# Patient Record
Sex: MALE | Race: WHITE | NOT HISPANIC OR LATINO | URBAN - METROPOLITAN AREA
[De-identification: names, ages, dates, MRNs, and addresses within clinical notes are randomized per-mention and may not be internally consistent; named-entity substitution may affect disease eponyms.]

---

## 2017-01-01 ENCOUNTER — AMBULATORY - HEALTHEAST (OUTPATIENT)
Dept: NURSING | Facility: CLINIC | Age: 67
End: 2017-01-01

## 2017-01-01 ENCOUNTER — COMMUNICATION - HEALTHEAST (OUTPATIENT)
Dept: FAMILY MEDICINE | Facility: CLINIC | Age: 67
End: 2017-01-01

## 2017-01-01 ENCOUNTER — COMMUNICATION - HEALTHEAST (OUTPATIENT)
Dept: ONCOLOGY | Facility: CLINIC | Age: 67
End: 2017-01-01

## 2017-01-01 ENCOUNTER — COMMUNICATION - HEALTHEAST (OUTPATIENT)
Dept: NURSING | Facility: CLINIC | Age: 67
End: 2017-01-01

## 2017-01-01 ENCOUNTER — RECORDS - HEALTHEAST (OUTPATIENT)
Dept: GENERAL RADIOLOGY | Facility: CLINIC | Age: 67
End: 2017-01-01

## 2017-01-01 ENCOUNTER — COMMUNICATION - HEALTHEAST (OUTPATIENT)
Dept: ONCOLOGY | Facility: HOSPITAL | Age: 67
End: 2017-01-01

## 2017-01-01 ENCOUNTER — HOSPITAL ENCOUNTER (OUTPATIENT)
Dept: PHYSICAL MEDICINE AND REHAB | Facility: CLINIC | Age: 67
Discharge: HOME | End: 2017-10-30
Attending: FAMILY MEDICINE

## 2017-01-01 ENCOUNTER — OFFICE VISIT - HEALTHEAST (OUTPATIENT)
Dept: FAMILY MEDICINE | Facility: CLINIC | Age: 67
End: 2017-01-01

## 2017-01-01 ENCOUNTER — AMBULATORY - HEALTHEAST (OUTPATIENT)
Dept: CARE COORDINATION | Facility: CLINIC | Age: 67
End: 2017-01-01

## 2017-01-01 ENCOUNTER — OFFICE VISIT - HEALTHEAST (OUTPATIENT)
Dept: ONCOLOGY | Facility: HOSPITAL | Age: 67
End: 2017-01-01

## 2017-01-01 ENCOUNTER — HOSPITAL ENCOUNTER (OUTPATIENT)
Dept: CT IMAGING | Facility: HOSPITAL | Age: 67
Setting detail: RADIATION/ONCOLOGY SERIES
Discharge: STILL ADMITTED OR ADMINISTRATIVE | End: 2017-10-10
Attending: INTERNAL MEDICINE

## 2017-01-01 ENCOUNTER — AMBULATORY - HEALTHEAST (OUTPATIENT)
Dept: INFUSION THERAPY | Facility: HOSPITAL | Age: 67
End: 2017-01-01

## 2017-01-01 DIAGNOSIS — I10 ESSENTIAL HYPERTENSION: ICD-10-CM

## 2017-01-01 DIAGNOSIS — C34.90 LUNG CANCER (H): ICD-10-CM

## 2017-01-01 DIAGNOSIS — M54.41 ACUTE RIGHT-SIDED LOW BACK PAIN WITH RIGHT-SIDED SCIATICA: ICD-10-CM

## 2017-01-01 DIAGNOSIS — R60.0 LOCALIZED EDEMA: ICD-10-CM

## 2017-01-01 DIAGNOSIS — M51.369 DEGENERATIVE DISC DISEASE, LUMBAR: ICD-10-CM

## 2017-01-01 DIAGNOSIS — M15.0 PRIMARY OSTEOARTHRITIS INVOLVING MULTIPLE JOINTS: ICD-10-CM

## 2017-01-01 DIAGNOSIS — M54.50 CHRONIC BILATERAL LOW BACK PAIN WITHOUT SCIATICA: ICD-10-CM

## 2017-01-01 DIAGNOSIS — M25.559 PAIN IN JOINT INVOLVING PELVIC REGION AND THIGH, UNSPECIFIED LATERALITY: ICD-10-CM

## 2017-01-01 DIAGNOSIS — Z79.899 CONTROLLED SUBSTANCE AGREEMENT SIGNED: ICD-10-CM

## 2017-01-01 DIAGNOSIS — F41.1 GENERALIZED ANXIETY DISORDER: ICD-10-CM

## 2017-01-01 DIAGNOSIS — G89.29 CHRONIC BILATERAL LOW BACK PAIN WITHOUT SCIATICA: ICD-10-CM

## 2017-01-01 DIAGNOSIS — M25.511 PAIN IN JOINT OF RIGHT SHOULDER: ICD-10-CM

## 2017-01-01 DIAGNOSIS — C34.02 MALIGNANT NEOPLASM OF HILUS OF LEFT LUNG (H): ICD-10-CM

## 2017-01-01 DIAGNOSIS — E78.2 MIXED HYPERLIPIDEMIA: ICD-10-CM

## 2017-01-01 DIAGNOSIS — M54.16 RADICULOPATHY, LUMBAR REGION: ICD-10-CM

## 2017-01-01 DIAGNOSIS — F40.240 CLAUSTROPHOBIA: ICD-10-CM

## 2017-01-01 DIAGNOSIS — M47.816 LUMBAR FACET ARTHROPATHY: ICD-10-CM

## 2017-01-01 DIAGNOSIS — K59.1 FUNCTIONAL DIARRHEA: ICD-10-CM

## 2017-01-01 DIAGNOSIS — C34.32 MALIGNANT NEOPLASM OF LOWER LOBE OF LEFT LUNG (H): ICD-10-CM

## 2017-01-01 ASSESSMENT — MIFFLIN-ST. JEOR
SCORE: 1605.84
SCORE: 1579.82

## 2017-01-04 ENCOUNTER — AMBULATORY - HEALTHEAST (OUTPATIENT)
Dept: NURSING | Facility: CLINIC | Age: 67
End: 2017-01-04

## 2017-01-06 ENCOUNTER — COMMUNICATION - HEALTHEAST (OUTPATIENT)
Dept: FAMILY MEDICINE | Facility: CLINIC | Age: 67
End: 2017-01-06

## 2017-01-09 ENCOUNTER — INFUSION - HEALTHEAST (OUTPATIENT)
Dept: INFUSION THERAPY | Facility: HOSPITAL | Age: 67
End: 2017-01-09

## 2017-01-09 ENCOUNTER — AMBULATORY - HEALTHEAST (OUTPATIENT)
Dept: ONCOLOGY | Facility: HOSPITAL | Age: 67
End: 2017-01-09

## 2017-01-09 ENCOUNTER — AMBULATORY - HEALTHEAST (OUTPATIENT)
Dept: INFUSION THERAPY | Facility: HOSPITAL | Age: 67
End: 2017-01-09

## 2017-01-09 DIAGNOSIS — D64.9 ANEMIA: ICD-10-CM

## 2017-01-09 DIAGNOSIS — E83.42 HYPOMAGNESEMIA: ICD-10-CM

## 2017-01-09 DIAGNOSIS — C34.32 MALIGNANT NEOPLASM OF LOWER LOBE OF LEFT LUNG (H): ICD-10-CM

## 2017-01-11 ENCOUNTER — OFFICE VISIT - HEALTHEAST (OUTPATIENT)
Dept: FAMILY MEDICINE | Facility: CLINIC | Age: 67
End: 2017-01-11

## 2017-01-11 DIAGNOSIS — R60.0 LOWER EXTREMITY EDEMA: ICD-10-CM

## 2017-01-11 ASSESSMENT — MIFFLIN-ST. JEOR: SCORE: 1654.66

## 2017-01-12 ENCOUNTER — COMMUNICATION - HEALTHEAST (OUTPATIENT)
Dept: FAMILY MEDICINE | Facility: CLINIC | Age: 67
End: 2017-01-12

## 2017-01-12 DIAGNOSIS — M79.641 HAND PAIN, RIGHT: ICD-10-CM

## 2017-01-12 DIAGNOSIS — M79.644 THUMB PAIN, RIGHT: ICD-10-CM

## 2017-01-12 DIAGNOSIS — M15.0 PRIMARY OSTEOARTHRITIS INVOLVING MULTIPLE JOINTS: ICD-10-CM

## 2017-01-18 ENCOUNTER — COMMUNICATION - HEALTHEAST (OUTPATIENT)
Dept: FAMILY MEDICINE | Facility: CLINIC | Age: 67
End: 2017-01-18

## 2017-01-18 DIAGNOSIS — R60.9 EDEMA: ICD-10-CM

## 2017-01-25 ENCOUNTER — COMMUNICATION - HEALTHEAST (OUTPATIENT)
Dept: FAMILY MEDICINE | Facility: CLINIC | Age: 67
End: 2017-01-25

## 2017-01-29 ENCOUNTER — COMMUNICATION - HEALTHEAST (OUTPATIENT)
Dept: FAMILY MEDICINE | Facility: CLINIC | Age: 67
End: 2017-01-29

## 2017-01-30 ENCOUNTER — COMMUNICATION - HEALTHEAST (OUTPATIENT)
Dept: FAMILY MEDICINE | Facility: CLINIC | Age: 67
End: 2017-01-30

## 2017-01-31 ENCOUNTER — COMMUNICATION - HEALTHEAST (OUTPATIENT)
Dept: FAMILY MEDICINE | Facility: CLINIC | Age: 67
End: 2017-01-31

## 2017-01-31 ENCOUNTER — OFFICE VISIT - HEALTHEAST (OUTPATIENT)
Dept: FAMILY MEDICINE | Facility: CLINIC | Age: 67
End: 2017-01-31

## 2017-01-31 DIAGNOSIS — I10 ESSENTIAL HYPERTENSION WITH GOAL BLOOD PRESSURE LESS THAN 140/90: ICD-10-CM

## 2017-01-31 DIAGNOSIS — R60.0 LOCALIZED EDEMA: ICD-10-CM

## 2017-01-31 ASSESSMENT — MIFFLIN-ST. JEOR: SCORE: 1654.38

## 2017-02-02 ENCOUNTER — HOSPITAL ENCOUNTER (OUTPATIENT)
Dept: CT IMAGING | Facility: HOSPITAL | Age: 67
Setting detail: RADIATION/ONCOLOGY SERIES
Discharge: STILL ADMITTED OR ADMINISTRATIVE | End: 2017-02-02
Attending: INTERNAL MEDICINE

## 2017-02-02 DIAGNOSIS — C34.32 MALIGNANT NEOPLASM OF LOWER LOBE OF LEFT LUNG (H): ICD-10-CM

## 2017-02-06 ENCOUNTER — AMBULATORY - HEALTHEAST (OUTPATIENT)
Dept: INFUSION THERAPY | Facility: HOSPITAL | Age: 67
End: 2017-02-06

## 2017-02-06 ENCOUNTER — OFFICE VISIT - HEALTHEAST (OUTPATIENT)
Dept: ONCOLOGY | Facility: HOSPITAL | Age: 67
End: 2017-02-06

## 2017-02-06 DIAGNOSIS — C34.32 MALIGNANT NEOPLASM OF LOWER LOBE OF LEFT LUNG (H): ICD-10-CM

## 2017-02-06 DIAGNOSIS — C34.30 MALIGNANT NEOPLASM OF LOWER LOBE OF LUNG, UNSPECIFIED LATERALITY (H): ICD-10-CM

## 2017-02-06 DIAGNOSIS — R79.0 LOW MAGNESIUM LEVELS: ICD-10-CM

## 2017-02-07 ENCOUNTER — COMMUNICATION - HEALTHEAST (OUTPATIENT)
Dept: FAMILY MEDICINE | Facility: CLINIC | Age: 67
End: 2017-02-07

## 2017-02-07 ENCOUNTER — AMBULATORY - HEALTHEAST (OUTPATIENT)
Dept: ONCOLOGY | Facility: HOSPITAL | Age: 67
End: 2017-02-07

## 2017-02-07 DIAGNOSIS — E53.8 B12 DEFICIENCY: ICD-10-CM

## 2017-02-08 ENCOUNTER — AMBULATORY - HEALTHEAST (OUTPATIENT)
Dept: ONCOLOGY | Facility: HOSPITAL | Age: 67
End: 2017-02-08

## 2017-02-08 ENCOUNTER — AMBULATORY - HEALTHEAST (OUTPATIENT)
Dept: NURSING | Facility: CLINIC | Age: 67
End: 2017-02-08

## 2017-02-08 DIAGNOSIS — E53.8 B12 DEFICIENCY: ICD-10-CM

## 2017-02-10 ENCOUNTER — OFFICE VISIT - HEALTHEAST (OUTPATIENT)
Dept: CARDIOLOGY | Facility: CLINIC | Age: 67
End: 2017-02-10

## 2017-02-10 DIAGNOSIS — I25.84 CORONARY ARTERY DISEASE DUE TO CALCIFIED CORONARY LESION: ICD-10-CM

## 2017-02-10 DIAGNOSIS — I50.32 CHRONIC DIASTOLIC CONGESTIVE HEART FAILURE (H): ICD-10-CM

## 2017-02-10 DIAGNOSIS — I10 HIGH BLOOD PRESSURE: ICD-10-CM

## 2017-02-10 DIAGNOSIS — I25.10 CORONARY ARTERY DISEASE DUE TO CALCIFIED CORONARY LESION: ICD-10-CM

## 2017-02-10 DIAGNOSIS — I10 ESSENTIAL HYPERTENSION: ICD-10-CM

## 2017-02-10 DIAGNOSIS — E78.2 MIXED HYPERLIPIDEMIA: ICD-10-CM

## 2017-02-10 ASSESSMENT — MIFFLIN-ST. JEOR: SCORE: 1672.52

## 2017-02-15 ENCOUNTER — OFFICE VISIT - HEALTHEAST (OUTPATIENT)
Dept: VASCULAR SURGERY | Facility: CLINIC | Age: 67
End: 2017-02-15

## 2017-02-15 DIAGNOSIS — I87.303 VENOUS HYPERTENSION OF BOTH LOWER EXTREMITIES: ICD-10-CM

## 2017-02-15 DIAGNOSIS — E55.9 VITAMIN D DEFICIENCY: ICD-10-CM

## 2017-02-15 DIAGNOSIS — R60.9 EDEMA: ICD-10-CM

## 2017-02-15 DIAGNOSIS — I89.0 ACQUIRED LYMPHEDEMA OF LEG: ICD-10-CM

## 2017-02-15 DIAGNOSIS — C34.90 LUNG CANCER (H): ICD-10-CM

## 2017-02-15 DIAGNOSIS — I87.009 POSTTHROMBOTIC SYNDROME: ICD-10-CM

## 2017-02-20 ENCOUNTER — OFFICE VISIT - HEALTHEAST (OUTPATIENT)
Dept: PHYSICAL THERAPY | Facility: REHABILITATION | Age: 67
End: 2017-02-20

## 2017-02-20 DIAGNOSIS — M79.605 BILATERAL LEG PAIN: ICD-10-CM

## 2017-02-20 DIAGNOSIS — I87.303 VENOUS HYPERTENSION OF BOTH LOWER EXTREMITIES: ICD-10-CM

## 2017-02-20 DIAGNOSIS — M79.604 BILATERAL LEG PAIN: ICD-10-CM

## 2017-02-20 DIAGNOSIS — I89.0 ACQUIRED LYMPHEDEMA OF LEG: ICD-10-CM

## 2017-02-22 ENCOUNTER — OFFICE VISIT - HEALTHEAST (OUTPATIENT)
Dept: PHYSICAL THERAPY | Facility: REHABILITATION | Age: 67
End: 2017-02-22

## 2017-02-22 DIAGNOSIS — M79.605 BILATERAL LEG PAIN: ICD-10-CM

## 2017-02-22 DIAGNOSIS — I87.303 VENOUS HYPERTENSION OF BOTH LOWER EXTREMITIES: ICD-10-CM

## 2017-02-22 DIAGNOSIS — I89.0 ACQUIRED LYMPHEDEMA OF LEG: ICD-10-CM

## 2017-02-22 DIAGNOSIS — M79.604 BILATERAL LEG PAIN: ICD-10-CM

## 2017-02-23 ENCOUNTER — OFFICE VISIT - HEALTHEAST (OUTPATIENT)
Dept: CARDIOLOGY | Facility: CLINIC | Age: 67
End: 2017-02-23

## 2017-02-23 ENCOUNTER — AMBULATORY - HEALTHEAST (OUTPATIENT)
Dept: CARDIOLOGY | Facility: CLINIC | Age: 67
End: 2017-02-23

## 2017-02-23 DIAGNOSIS — I50.32 CHRONIC DIASTOLIC HEART FAILURE (H): ICD-10-CM

## 2017-02-23 ASSESSMENT — MIFFLIN-ST. JEOR: SCORE: 1631.7

## 2017-02-27 ENCOUNTER — OFFICE VISIT - HEALTHEAST (OUTPATIENT)
Dept: PHYSICAL THERAPY | Facility: REHABILITATION | Age: 67
End: 2017-02-27

## 2017-02-27 DIAGNOSIS — C34.30 MALIGNANT NEOPLASM OF LOWER LOBE OF LUNG, UNSPECIFIED LATERALITY (H): ICD-10-CM

## 2017-02-27 DIAGNOSIS — I89.0 ACQUIRED LYMPHEDEMA OF LEG: ICD-10-CM

## 2017-02-27 DIAGNOSIS — R79.0 LOW MAGNESIUM LEVELS: ICD-10-CM

## 2017-02-27 DIAGNOSIS — M79.604 BILATERAL LEG PAIN: ICD-10-CM

## 2017-02-27 DIAGNOSIS — M79.605 BILATERAL LEG PAIN: ICD-10-CM

## 2017-02-27 DIAGNOSIS — I87.303 VENOUS HYPERTENSION OF BOTH LOWER EXTREMITIES: ICD-10-CM

## 2017-03-07 ENCOUNTER — AMBULATORY - HEALTHEAST (OUTPATIENT)
Dept: NURSING | Facility: CLINIC | Age: 67
End: 2017-03-07

## 2017-03-07 ENCOUNTER — COMMUNICATION - HEALTHEAST (OUTPATIENT)
Dept: NURSING | Facility: CLINIC | Age: 67
End: 2017-03-07

## 2017-03-13 ENCOUNTER — COMMUNICATION - HEALTHEAST (OUTPATIENT)
Dept: FAMILY MEDICINE | Facility: CLINIC | Age: 67
End: 2017-03-13

## 2017-03-13 DIAGNOSIS — M15.0 PRIMARY OSTEOARTHRITIS INVOLVING MULTIPLE JOINTS: ICD-10-CM

## 2017-03-13 DIAGNOSIS — M79.641 HAND PAIN, RIGHT: ICD-10-CM

## 2017-03-13 DIAGNOSIS — M79.644 THUMB PAIN, RIGHT: ICD-10-CM

## 2017-03-21 ENCOUNTER — COMMUNICATION - HEALTHEAST (OUTPATIENT)
Dept: FAMILY MEDICINE | Facility: CLINIC | Age: 67
End: 2017-03-21

## 2017-03-21 DIAGNOSIS — I10 ESSENTIAL HYPERTENSION: ICD-10-CM

## 2017-03-28 ENCOUNTER — COMMUNICATION - HEALTHEAST (OUTPATIENT)
Dept: FAMILY MEDICINE | Facility: CLINIC | Age: 67
End: 2017-03-28

## 2017-04-03 ENCOUNTER — HOSPITAL ENCOUNTER (OUTPATIENT)
Dept: CT IMAGING | Facility: HOSPITAL | Age: 67
Setting detail: RADIATION/ONCOLOGY SERIES
Discharge: STILL ADMITTED OR ADMINISTRATIVE | End: 2017-04-03
Attending: INTERNAL MEDICINE

## 2017-04-03 DIAGNOSIS — C34.30 MALIGNANT NEOPLASM OF LOWER LOBE OF LUNG, UNSPECIFIED LATERALITY (H): ICD-10-CM

## 2017-04-04 ENCOUNTER — AMBULATORY - HEALTHEAST (OUTPATIENT)
Dept: NURSING | Facility: CLINIC | Age: 67
End: 2017-04-04

## 2017-04-05 ENCOUNTER — COMMUNICATION - HEALTHEAST (OUTPATIENT)
Dept: FAMILY MEDICINE | Facility: CLINIC | Age: 67
End: 2017-04-05

## 2017-04-05 DIAGNOSIS — F41.1 GENERALIZED ANXIETY DISORDER: ICD-10-CM

## 2017-04-06 ENCOUNTER — OFFICE VISIT - HEALTHEAST (OUTPATIENT)
Dept: ONCOLOGY | Facility: HOSPITAL | Age: 67
End: 2017-04-06

## 2017-04-06 ENCOUNTER — AMBULATORY - HEALTHEAST (OUTPATIENT)
Dept: INFUSION THERAPY | Facility: HOSPITAL | Age: 67
End: 2017-04-06

## 2017-04-06 DIAGNOSIS — C34.30 MALIGNANT NEOPLASM OF LOWER LOBE OF LUNG, UNSPECIFIED LATERALITY (H): ICD-10-CM

## 2017-04-06 DIAGNOSIS — C34.02 MALIGNANT NEOPLASM OF HILUS OF LEFT LUNG (H): ICD-10-CM

## 2017-04-06 DIAGNOSIS — C34.32 MALIGNANT NEOPLASM OF LOWER LOBE OF LEFT LUNG (H): ICD-10-CM

## 2017-04-06 ASSESSMENT — MIFFLIN-ST. JEOR: SCORE: 1608.11

## 2017-04-07 ENCOUNTER — COMMUNICATION - HEALTHEAST (OUTPATIENT)
Dept: ONCOLOGY | Facility: CLINIC | Age: 67
End: 2017-04-07

## 2017-04-10 ENCOUNTER — COMMUNICATION - HEALTHEAST (OUTPATIENT)
Dept: FAMILY MEDICINE | Facility: CLINIC | Age: 67
End: 2017-04-10

## 2017-04-10 DIAGNOSIS — F41.1 GENERALIZED ANXIETY DISORDER: ICD-10-CM

## 2017-04-14 ENCOUNTER — OFFICE VISIT - HEALTHEAST (OUTPATIENT)
Dept: FAMILY MEDICINE | Facility: CLINIC | Age: 67
End: 2017-04-14

## 2017-04-14 DIAGNOSIS — M25.512 PAIN IN JOINT OF LEFT SHOULDER: ICD-10-CM

## 2017-04-14 ASSESSMENT — MIFFLIN-ST. JEOR: SCORE: 1601.76

## 2017-05-03 ENCOUNTER — COMMUNICATION - HEALTHEAST (OUTPATIENT)
Dept: FAMILY MEDICINE | Facility: CLINIC | Age: 67
End: 2017-05-03

## 2017-05-03 ENCOUNTER — AMBULATORY - HEALTHEAST (OUTPATIENT)
Dept: NURSING | Facility: CLINIC | Age: 67
End: 2017-05-03

## 2017-05-03 DIAGNOSIS — E78.2 MIXED HYPERLIPIDEMIA: ICD-10-CM

## 2017-05-19 ENCOUNTER — OFFICE VISIT - HEALTHEAST (OUTPATIENT)
Dept: FAMILY MEDICINE | Facility: CLINIC | Age: 67
End: 2017-05-19

## 2017-05-19 DIAGNOSIS — R06.00 PND (PAROXYSMAL NOCTURNAL DYSPNEA): ICD-10-CM

## 2017-05-19 DIAGNOSIS — J39.8: ICD-10-CM

## 2017-05-19 DIAGNOSIS — M25.511 PAIN IN JOINT OF RIGHT SHOULDER: ICD-10-CM
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
## 2017-05-19 DIAGNOSIS — F41.1 GENERALIZED ANXIETY DISORDER: ICD-10-CM

## 2017-05-19 DIAGNOSIS — M54.6 ACUTE RIGHT-SIDED THORACIC BACK PAIN: ICD-10-CM

## 2017-05-19 DIAGNOSIS — J98.8: ICD-10-CM

## 2017-05-19 DIAGNOSIS — R06.02 SHORTNESS OF BREATH: ICD-10-CM

## 2017-05-19 ASSESSMENT — MIFFLIN-ST. JEOR: SCORE: 1613.1

## 2017-05-20 ENCOUNTER — COMMUNICATION - HEALTHEAST (OUTPATIENT)
Dept: FAMILY MEDICINE | Facility: CLINIC | Age: 67
End: 2017-05-20

## 2017-05-20 DIAGNOSIS — I10 ESSENTIAL HYPERTENSION: ICD-10-CM

## 2017-05-22 ENCOUNTER — RECORDS - HEALTHEAST (OUTPATIENT)
Dept: ADMINISTRATIVE | Facility: OTHER | Age: 67
End: 2017-05-22

## 2017-05-22 ENCOUNTER — COMMUNICATION - HEALTHEAST (OUTPATIENT)
Dept: FAMILY MEDICINE | Facility: CLINIC | Age: 67
End: 2017-05-22

## 2017-05-22 DIAGNOSIS — I10 ESSENTIAL HYPERTENSION: ICD-10-CM

## 2017-05-23 ENCOUNTER — COMMUNICATION - HEALTHEAST (OUTPATIENT)
Dept: FAMILY MEDICINE | Facility: CLINIC | Age: 67
End: 2017-05-23

## 2017-05-23 DIAGNOSIS — M79.641 HAND PAIN, RIGHT: ICD-10-CM

## 2017-05-23 DIAGNOSIS — M79.644 THUMB PAIN, RIGHT: ICD-10-CM

## 2017-05-23 DIAGNOSIS — M15.0 PRIMARY OSTEOARTHRITIS INVOLVING MULTIPLE JOINTS: ICD-10-CM

## 2017-06-05 ENCOUNTER — COMMUNICATION - HEALTHEAST (OUTPATIENT)
Dept: ADMINISTRATIVE | Facility: CLINIC | Age: 67
End: 2017-06-05

## 2017-06-06 ENCOUNTER — AMBULATORY - HEALTHEAST (OUTPATIENT)
Dept: NURSING | Facility: CLINIC | Age: 67
End: 2017-06-06

## 2017-06-08 ENCOUNTER — RECORDS - HEALTHEAST (OUTPATIENT)
Dept: ADMINISTRATIVE | Facility: OTHER | Age: 67
End: 2017-06-08

## 2017-06-09 ENCOUNTER — RECORDS - HEALTHEAST (OUTPATIENT)
Dept: ADMINISTRATIVE | Facility: OTHER | Age: 67
End: 2017-06-09

## 2017-06-15 ENCOUNTER — COMMUNICATION - HEALTHEAST (OUTPATIENT)
Dept: FAMILY MEDICINE | Facility: CLINIC | Age: 67
End: 2017-06-15

## 2017-07-11 ENCOUNTER — COMMUNICATION - HEALTHEAST (OUTPATIENT)
Dept: ADMINISTRATIVE | Facility: HOSPITAL | Age: 67
End: 2017-07-11

## 2017-07-11 ENCOUNTER — HOSPITAL ENCOUNTER (OUTPATIENT)
Dept: CT IMAGING | Facility: HOSPITAL | Age: 67
Setting detail: RADIATION/ONCOLOGY SERIES
Discharge: STILL ADMITTED OR ADMINISTRATIVE | End: 2017-07-11
Attending: INTERNAL MEDICINE

## 2017-07-11 DIAGNOSIS — C34.32 MALIGNANT NEOPLASM OF LOWER LOBE OF LEFT LUNG (H): ICD-10-CM

## 2017-07-13 ENCOUNTER — OFFICE VISIT - HEALTHEAST (OUTPATIENT)
Dept: ONCOLOGY | Facility: HOSPITAL | Age: 67
End: 2017-07-13

## 2017-07-13 ENCOUNTER — AMBULATORY - HEALTHEAST (OUTPATIENT)
Dept: INFUSION THERAPY | Facility: HOSPITAL | Age: 67
End: 2017-07-13

## 2017-07-13 ENCOUNTER — INFUSION - HEALTHEAST (OUTPATIENT)
Dept: INFUSION THERAPY | Facility: HOSPITAL | Age: 67
End: 2017-07-13

## 2017-07-13 DIAGNOSIS — C34.90 LUNG CANCER (H): ICD-10-CM

## 2017-07-13 DIAGNOSIS — C34.32 MALIGNANT NEOPLASM OF LOWER LOBE OF LEFT LUNG (H): ICD-10-CM

## 2017-07-31 ENCOUNTER — COMMUNICATION - HEALTHEAST (OUTPATIENT)
Dept: NURSING | Facility: CLINIC | Age: 67
End: 2017-07-31

## 2017-08-01 ENCOUNTER — COMMUNICATION - HEALTHEAST (OUTPATIENT)
Dept: FAMILY MEDICINE | Facility: CLINIC | Age: 67
End: 2017-08-01

## 2017-08-01 DIAGNOSIS — M79.644 THUMB PAIN, RIGHT: ICD-10-CM

## 2017-08-01 DIAGNOSIS — M79.641 HAND PAIN, RIGHT: ICD-10-CM

## 2017-08-01 DIAGNOSIS — M15.0 PRIMARY OSTEOARTHRITIS INVOLVING MULTIPLE JOINTS: ICD-10-CM

## 2017-08-04 ENCOUNTER — AMBULATORY - HEALTHEAST (OUTPATIENT)
Dept: NURSING | Facility: CLINIC | Age: 67
End: 2017-08-04

## 2017-08-06 ENCOUNTER — COMMUNICATION - HEALTHEAST (OUTPATIENT)
Dept: FAMILY MEDICINE | Facility: CLINIC | Age: 67
End: 2017-08-06

## 2017-08-07 ENCOUNTER — COMMUNICATION - HEALTHEAST (OUTPATIENT)
Dept: NURSING | Facility: CLINIC | Age: 67
End: 2017-08-07

## 2017-08-09 ENCOUNTER — COMMUNICATION - HEALTHEAST (OUTPATIENT)
Dept: NURSING | Facility: CLINIC | Age: 67
End: 2017-08-09

## 2017-08-09 ENCOUNTER — AMBULATORY - HEALTHEAST (OUTPATIENT)
Dept: CARE COORDINATION | Facility: CLINIC | Age: 67
End: 2017-08-09

## 2017-08-24 ENCOUNTER — COMMUNICATION - HEALTHEAST (OUTPATIENT)
Dept: FAMILY MEDICINE | Facility: CLINIC | Age: 67
End: 2017-08-24

## 2017-08-24 DIAGNOSIS — R60.9 EDEMA: ICD-10-CM

## 2018-01-01 ENCOUNTER — HOME CARE/HOSPICE - HEALTHEAST (OUTPATIENT)
Dept: HOME HEALTH SERVICES | Facility: HOME HEALTH | Age: 68
End: 2018-01-01

## 2018-01-01 ENCOUNTER — COMMUNICATION - HEALTHEAST (OUTPATIENT)
Dept: FAMILY MEDICINE | Facility: CLINIC | Age: 68
End: 2018-01-01

## 2018-01-01 ENCOUNTER — OFFICE VISIT - HEALTHEAST (OUTPATIENT)
Dept: ONCOLOGY | Facility: HOSPITAL | Age: 68
End: 2018-01-01

## 2018-01-01 ENCOUNTER — HOSPITAL ENCOUNTER (OUTPATIENT)
Dept: MRI IMAGING | Facility: HOSPITAL | Age: 68
Setting detail: RADIATION/ONCOLOGY SERIES
Discharge: STILL ADMITTED OR ADMINISTRATIVE | End: 2018-08-08
Attending: NURSE PRACTITIONER

## 2018-01-01 ENCOUNTER — COMMUNICATION - HEALTHEAST (OUTPATIENT)
Dept: GERIATRICS | Facility: CLINIC | Age: 68
End: 2018-01-01

## 2018-01-01 ENCOUNTER — AMBULATORY - HEALTHEAST (OUTPATIENT)
Dept: ONCOLOGY | Facility: HOSPITAL | Age: 68
End: 2018-01-01

## 2018-01-01 ENCOUNTER — OFFICE VISIT - HEALTHEAST (OUTPATIENT)
Dept: RADIATION ONCOLOGY | Facility: HOSPITAL | Age: 68
End: 2018-01-01

## 2018-01-01 ENCOUNTER — OFFICE VISIT - HEALTHEAST (OUTPATIENT)
Dept: FAMILY MEDICINE | Facility: CLINIC | Age: 68
End: 2018-01-01

## 2018-01-01 ENCOUNTER — OFFICE VISIT - HEALTHEAST (OUTPATIENT)
Dept: GERIATRICS | Facility: CLINIC | Age: 68
End: 2018-01-01

## 2018-01-01 ENCOUNTER — AMBULATORY - HEALTHEAST (OUTPATIENT)
Dept: INFUSION THERAPY | Facility: HOSPITAL | Age: 68
End: 2018-01-01

## 2018-01-01 ENCOUNTER — RECORDS - HEALTHEAST (OUTPATIENT)
Dept: LAB | Facility: CLINIC | Age: 68
End: 2018-01-01

## 2018-01-01 ENCOUNTER — INFUSION - HEALTHEAST (OUTPATIENT)
Dept: INFUSION THERAPY | Facility: HOSPITAL | Age: 68
End: 2018-01-01

## 2018-01-01 ENCOUNTER — COMMUNICATION - HEALTHEAST (OUTPATIENT)
Dept: ONCOLOGY | Facility: HOSPITAL | Age: 68
End: 2018-01-01

## 2018-01-01 ENCOUNTER — COMMUNICATION - HEALTHEAST (OUTPATIENT)
Dept: ONCOLOGY | Facility: CLINIC | Age: 68
End: 2018-01-01

## 2018-01-01 ENCOUNTER — COMMUNICATION - HEALTHEAST (OUTPATIENT)
Dept: HOME HEALTH SERVICES | Facility: HOME HEALTH | Age: 68
End: 2018-01-01

## 2018-01-01 ENCOUNTER — RECORDS - HEALTHEAST (OUTPATIENT)
Dept: ADMINISTRATIVE | Facility: OTHER | Age: 68
End: 2018-01-01

## 2018-01-01 ENCOUNTER — HOSPITAL ENCOUNTER (OUTPATIENT)
Dept: PHYSICAL MEDICINE AND REHAB | Facility: CLINIC | Age: 68
Discharge: HOME | End: 2018-08-23
Attending: FAMILY MEDICINE

## 2018-01-01 ENCOUNTER — HOSPITAL ENCOUNTER (OUTPATIENT)
Dept: MRI IMAGING | Facility: HOSPITAL | Age: 68
Discharge: HOME | End: 2018-08-08

## 2018-01-01 ENCOUNTER — COMMUNICATION - HEALTHEAST (OUTPATIENT)
Dept: SCHEDULING | Facility: CLINIC | Age: 68
End: 2018-01-01

## 2018-01-01 ENCOUNTER — HOSPITAL ENCOUNTER (OUTPATIENT)
Dept: INTERVENTIONAL RADIOLOGY/VASCULAR | Facility: HOSPITAL | Age: 68
Setting detail: RADIATION/ONCOLOGY SERIES
Discharge: STILL ADMITTED OR ADMINISTRATIVE | End: 2018-08-08
Attending: NURSE PRACTITIONER

## 2018-01-01 ENCOUNTER — AMBULATORY - HEALTHEAST (OUTPATIENT)
Dept: PULMONOLOGY | Facility: OTHER | Age: 68
End: 2018-01-01

## 2018-01-01 ENCOUNTER — RECORDS - HEALTHEAST (OUTPATIENT)
Dept: PULMONOLOGY | Facility: OTHER | Age: 68
End: 2018-01-01

## 2018-01-01 ENCOUNTER — AMBULATORY - HEALTHEAST (OUTPATIENT)
Dept: NURSING | Facility: CLINIC | Age: 68
End: 2018-01-01

## 2018-01-01 ENCOUNTER — OFFICE VISIT - HEALTHEAST (OUTPATIENT)
Dept: PULMONOLOGY | Facility: OTHER | Age: 68
End: 2018-01-01

## 2018-01-01 ENCOUNTER — COMMUNICATION - HEALTHEAST (OUTPATIENT)
Dept: PHARMACY | Facility: CLINIC | Age: 68
End: 2018-01-01

## 2018-01-01 ENCOUNTER — HOSPITAL ENCOUNTER (OUTPATIENT)
Dept: CT IMAGING | Facility: HOSPITAL | Age: 68
Setting detail: RADIATION/ONCOLOGY SERIES
Discharge: STILL ADMITTED OR ADMINISTRATIVE | End: 2018-01-17
Attending: INTERNAL MEDICINE

## 2018-01-01 ENCOUNTER — AMBULATORY - HEALTHEAST (OUTPATIENT)
Dept: GERIATRICS | Facility: CLINIC | Age: 68
End: 2018-01-01

## 2018-01-01 ENCOUNTER — AMBULATORY - HEALTHEAST (OUTPATIENT)
Dept: FAMILY MEDICINE | Facility: CLINIC | Age: 68
End: 2018-01-01

## 2018-01-01 ENCOUNTER — HOSPITAL ENCOUNTER (OUTPATIENT)
Dept: CT IMAGING | Facility: HOSPITAL | Age: 68
Setting detail: RADIATION/ONCOLOGY SERIES
Discharge: STILL ADMITTED OR ADMINISTRATIVE | End: 2018-03-28
Attending: INTERNAL MEDICINE

## 2018-01-01 DIAGNOSIS — C79.51 LUNG CANCER METASTATIC TO BONE (H): ICD-10-CM

## 2018-01-01 DIAGNOSIS — C34.90 LUNG CANCER (H): ICD-10-CM

## 2018-01-01 DIAGNOSIS — J84.116 CRYPTOGENIC ORGANIZING PNEUMONIA (H): ICD-10-CM

## 2018-01-01 DIAGNOSIS — R42 DIZZINESS AND GIDDINESS: ICD-10-CM

## 2018-01-01 DIAGNOSIS — C34.90 ADENOCARCINOMA, LUNG (H): ICD-10-CM

## 2018-01-01 DIAGNOSIS — M25.559 PAIN IN JOINT INVOLVING PELVIC REGION AND THIGH, UNSPECIFIED LATERALITY: ICD-10-CM

## 2018-01-01 DIAGNOSIS — I50.30 DIASTOLIC CONGESTIVE HEART FAILURE (H): ICD-10-CM

## 2018-01-01 DIAGNOSIS — C34.90 LUNG CANCER METASTATIC TO BONE (H): ICD-10-CM

## 2018-01-01 DIAGNOSIS — Z51.11 ENCOUNTER FOR ANTINEOPLASTIC CHEMOTHERAPY: ICD-10-CM

## 2018-01-01 DIAGNOSIS — E53.8 B12 DEFICIENCY: ICD-10-CM

## 2018-01-01 DIAGNOSIS — I50.32 CHRONIC DIASTOLIC HEART FAILURE (H): ICD-10-CM

## 2018-01-01 DIAGNOSIS — E87.6 HYPOKALEMIA: ICD-10-CM

## 2018-01-01 DIAGNOSIS — D72.829 LEUKOCYTOSIS, UNSPECIFIED TYPE: ICD-10-CM

## 2018-01-01 DIAGNOSIS — D64.9 CHRONIC ANEMIA: ICD-10-CM

## 2018-01-01 DIAGNOSIS — I25.10 CAD (CORONARY ARTERY DISEASE): ICD-10-CM

## 2018-01-01 DIAGNOSIS — R79.0 LOW MAGNESIUM LEVELS: ICD-10-CM

## 2018-01-01 DIAGNOSIS — J18.9 PNEUMONIA OF RIGHT LOWER LOBE DUE TO INFECTIOUS ORGANISM: ICD-10-CM

## 2018-01-01 DIAGNOSIS — D64.9 ANEMIA: ICD-10-CM

## 2018-01-01 DIAGNOSIS — F41.9 ANXIETY DISORDER: ICD-10-CM

## 2018-01-01 DIAGNOSIS — F41.1 GENERALIZED ANXIETY DISORDER: ICD-10-CM

## 2018-01-01 DIAGNOSIS — I50.32 CHRONIC DIASTOLIC CONGESTIVE HEART FAILURE (H): ICD-10-CM

## 2018-01-01 DIAGNOSIS — G89.29 CHRONIC LOW BACK PAIN: ICD-10-CM

## 2018-01-01 DIAGNOSIS — R19.7 DIARRHEA, UNSPECIFIED TYPE: ICD-10-CM

## 2018-01-01 DIAGNOSIS — C79.51 BONE METASTASIS: ICD-10-CM

## 2018-01-01 DIAGNOSIS — E78.2 MIXED HYPERLIPIDEMIA: ICD-10-CM

## 2018-01-01 DIAGNOSIS — J96.00 ACUTE RESPIRATORY FAILURE (H): ICD-10-CM

## 2018-01-01 DIAGNOSIS — G89.29 CHRONIC MIDLINE LOW BACK PAIN WITHOUT SCIATICA: ICD-10-CM

## 2018-01-01 DIAGNOSIS — C34.90 STAGE 4 MALIGNANT NEOPLASM OF LUNG, UNSPECIFIED LATERALITY (H): ICD-10-CM

## 2018-01-01 DIAGNOSIS — D63.8 ANEMIA, CHRONIC DISEASE: ICD-10-CM

## 2018-01-01 DIAGNOSIS — M25.511 PAIN IN JOINT OF RIGHT SHOULDER: ICD-10-CM

## 2018-01-01 DIAGNOSIS — G89.3 CANCER RELATED PAIN: ICD-10-CM

## 2018-01-01 DIAGNOSIS — M10.9 GOUT: ICD-10-CM

## 2018-01-01 DIAGNOSIS — B37.0 ORAL CANDIDIASIS: ICD-10-CM

## 2018-01-01 DIAGNOSIS — S30.1XXA HEMATOMA OF ABDOMINAL WALL: ICD-10-CM

## 2018-01-01 DIAGNOSIS — M54.50 CHRONIC MIDLINE LOW BACK PAIN WITHOUT SCIATICA: ICD-10-CM

## 2018-01-01 DIAGNOSIS — F41.8 DEPRESSION WITH ANXIETY: ICD-10-CM

## 2018-01-01 DIAGNOSIS — Z86.79 HISTORY OF CHF (CONGESTIVE HEART FAILURE): ICD-10-CM

## 2018-01-01 DIAGNOSIS — M62.81 GENERALIZED MUSCLE WEAKNESS: ICD-10-CM

## 2018-01-01 DIAGNOSIS — Z90.2 S/P LOBECTOMY OF LUNG: ICD-10-CM

## 2018-01-01 DIAGNOSIS — E83.42 HYPOMAGNESEMIA: ICD-10-CM

## 2018-01-01 DIAGNOSIS — C34.32 MALIGNANT NEOPLASM OF LOWER LOBE OF LEFT LUNG (H): ICD-10-CM

## 2018-01-01 DIAGNOSIS — C34.02 MALIGNANT NEOPLASM OF HILUS OF LEFT LUNG (H): ICD-10-CM

## 2018-01-01 DIAGNOSIS — T14.8XXA HEMATOMA: ICD-10-CM

## 2018-01-01 DIAGNOSIS — R52 GENERALIZED PAIN: ICD-10-CM

## 2018-01-01 DIAGNOSIS — I10 ESSENTIAL HYPERTENSION: ICD-10-CM

## 2018-01-01 DIAGNOSIS — D51.9: ICD-10-CM

## 2018-01-01 DIAGNOSIS — T14.8XXA BRUISING: ICD-10-CM

## 2018-01-01 DIAGNOSIS — R06.02 SHORTNESS OF BREATH: ICD-10-CM

## 2018-01-01 DIAGNOSIS — M15.0 PRIMARY OSTEOARTHRITIS INVOLVING MULTIPLE JOINTS: ICD-10-CM

## 2018-01-01 DIAGNOSIS — R06.02 SOB (SHORTNESS OF BREATH): ICD-10-CM

## 2018-01-01 DIAGNOSIS — G47.33 OBSTRUCTIVE SLEEP APNEA SYNDROME: ICD-10-CM

## 2018-01-01 DIAGNOSIS — N17.9 ACUTE RENAL FAILURE, UNSPECIFIED ACUTE RENAL FAILURE TYPE (H): ICD-10-CM

## 2018-01-01 DIAGNOSIS — C78.00 MALIGNANT NEOPLASM METASTATIC TO LUNG, UNSPECIFIED LATERALITY (H): ICD-10-CM

## 2018-01-01 DIAGNOSIS — R52 PAIN MANAGEMENT: ICD-10-CM

## 2018-01-01 DIAGNOSIS — R79.1 ABNORMAL COAGULATION PROFILE: ICD-10-CM

## 2018-01-01 DIAGNOSIS — J44.9 STAGE 3 SEVERE COPD BY GOLD CLASSIFICATION (H): ICD-10-CM

## 2018-01-01 DIAGNOSIS — K92.2 GI BLEED: ICD-10-CM

## 2018-01-01 DIAGNOSIS — G89.3 CANCER ASSOCIATED PAIN: ICD-10-CM

## 2018-01-01 DIAGNOSIS — C34.90 STAGE 4 LUNG CANCER, UNSPECIFIED LATERALITY (H): ICD-10-CM

## 2018-01-01 DIAGNOSIS — R53.1 GENERALIZED WEAKNESS: ICD-10-CM

## 2018-01-01 DIAGNOSIS — J44.9 COPD, SEVERE (H): ICD-10-CM

## 2018-01-01 DIAGNOSIS — R74.8 ELEVATED LIVER ENZYMES: ICD-10-CM

## 2018-01-01 DIAGNOSIS — R60.0 LOWER EXTREMITY EDEMA: ICD-10-CM

## 2018-01-01 DIAGNOSIS — M54.50 CHRONIC RIGHT-SIDED LOW BACK PAIN WITHOUT SCIATICA: ICD-10-CM

## 2018-01-01 DIAGNOSIS — G89.29 CHRONIC RIGHT-SIDED LOW BACK PAIN WITHOUT SCIATICA: ICD-10-CM

## 2018-01-01 DIAGNOSIS — N17.9 ACUTE KIDNEY INJURY (H): ICD-10-CM

## 2018-01-01 DIAGNOSIS — C79.51 BONY METASTASIS: ICD-10-CM

## 2018-01-01 DIAGNOSIS — E83.51 HYPOCALCEMIA: ICD-10-CM

## 2018-01-01 DIAGNOSIS — J18.9 HEALTHCARE-ASSOCIATED PNEUMONIA: ICD-10-CM

## 2018-01-01 DIAGNOSIS — M48.061 SPINAL STENOSIS OF LUMBAR REGION WITHOUT NEUROGENIC CLAUDICATION: ICD-10-CM

## 2018-01-01 DIAGNOSIS — M1A.00X0 IDIOPATHIC CHRONIC GOUT WITHOUT TOPHUS, UNSPECIFIED SITE: ICD-10-CM

## 2018-01-01 DIAGNOSIS — Z51.5 ENCOUNTER FOR PALLIATIVE CARE: ICD-10-CM

## 2018-01-01 DIAGNOSIS — R06.03 RESPIRATORY DISTRESS: ICD-10-CM

## 2018-01-01 DIAGNOSIS — D64.9 ANEMIA, UNSPECIFIED TYPE: ICD-10-CM

## 2018-01-01 DIAGNOSIS — R61 GENERALIZED HYPERHIDROSIS: ICD-10-CM

## 2018-01-01 DIAGNOSIS — Z79.899 MEDICATION MANAGEMENT: ICD-10-CM

## 2018-01-01 DIAGNOSIS — M54.50 CHRONIC LOW BACK PAIN: ICD-10-CM

## 2018-01-01 DIAGNOSIS — D64.9 NORMOCYTIC ANEMIA: ICD-10-CM

## 2018-01-01 DIAGNOSIS — R05.9 COUGH: ICD-10-CM

## 2018-01-01 DIAGNOSIS — M25.551 HIP PAIN, RIGHT: ICD-10-CM

## 2018-01-01 LAB
AEROBIC BLOOD CULTURE BOTTLE: NO GROWTH
AEROBIC BLOOD CULTURE BOTTLE: NO GROWTH
ALBUMIN SERPL-MCNC: 2.6 G/DL (ref 3.5–5)
ALBUMIN SERPL-MCNC: 2.7 G/DL (ref 3.5–5)
ALBUMIN SERPL-MCNC: 2.8 G/DL (ref 3.5–5)
ALBUMIN SERPL-MCNC: 2.9 G/DL (ref 3.5–5)
ALBUMIN SERPL-MCNC: 3 G/DL (ref 3.5–5)
ALBUMIN SERPL-MCNC: 3 G/DL (ref 3.5–5)
ALP SERPL-CCNC: 199 U/L (ref 45–120)
ALP SERPL-CCNC: 203 U/L (ref 45–120)
ALP SERPL-CCNC: 211 U/L (ref 45–120)
ALP SERPL-CCNC: 217 U/L (ref 45–120)
ALP SERPL-CCNC: 233 U/L (ref 45–120)
ALP SERPL-CCNC: 242 U/L (ref 45–120)
ALP SERPL-CCNC: 265 U/L (ref 45–120)
ALP SERPL-CCNC: 362 U/L (ref 45–120)
ALT SERPL W P-5'-P-CCNC: 10 U/L (ref 0–45)
ALT SERPL W P-5'-P-CCNC: 11 U/L (ref 0–45)
ALT SERPL W P-5'-P-CCNC: 28 U/L (ref 0–45)
ALT SERPL W P-5'-P-CCNC: 36 U/L (ref 0–45)
ALT SERPL W P-5'-P-CCNC: 37 U/L (ref 0–45)
ALT SERPL W P-5'-P-CCNC: 47 U/L (ref 0–45)
ALT SERPL W P-5'-P-CCNC: <9 U/L (ref 0–45)
ALT SERPL W P-5'-P-CCNC: <9 U/L (ref 0–45)
ANAEROBIC BLOOD CULTURE BOTTLE: NO GROWTH
ANAEROBIC BLOOD CULTURE BOTTLE: NO GROWTH
ANION GAP SERPL CALCULATED.3IONS-SCNC: 10 MMOL/L (ref 5–18)
ANION GAP SERPL CALCULATED.3IONS-SCNC: 11 MMOL/L (ref 5–18)
ANION GAP SERPL CALCULATED.3IONS-SCNC: 13 MMOL/L (ref 5–18)
ANION GAP SERPL CALCULATED.3IONS-SCNC: 14 MMOL/L (ref 5–18)
ANION GAP SERPL CALCULATED.3IONS-SCNC: 9 MMOL/L (ref 5–18)
ANION GAP SERPL CALCULATED.3IONS-SCNC: 9 MMOL/L (ref 5–18)
APTT PPP: 21 SECONDS (ref 24–37)
APTT PPP: 25 SECONDS (ref 24–37)
AST SERPL W P-5'-P-CCNC: 11 U/L (ref 0–40)
AST SERPL W P-5'-P-CCNC: 12 U/L (ref 0–40)
AST SERPL W P-5'-P-CCNC: 19 U/L (ref 0–40)
AST SERPL W P-5'-P-CCNC: 23 U/L (ref 0–40)
AST SERPL W P-5'-P-CCNC: 24 U/L (ref 0–40)
AST SERPL W P-5'-P-CCNC: 24 U/L (ref 0–40)
AST SERPL W P-5'-P-CCNC: 57 U/L (ref 0–40)
AST SERPL W P-5'-P-CCNC: 59 U/L (ref 0–40)
BASOPHILS # BLD AUTO: 0 THOU/UL (ref 0–0.2)
BASOPHILS # BLD AUTO: 0.1 THOU/UL (ref 0–0.2)
BASOPHILS NFR BLD AUTO: 0 % (ref 0–2)
BASOPHILS NFR BLD AUTO: 1 % (ref 0–2)
BILIRUB SERPL-MCNC: 0.5 MG/DL (ref 0–1)
BILIRUB SERPL-MCNC: 0.7 MG/DL (ref 0–1)
BILIRUB SERPL-MCNC: 0.8 MG/DL (ref 0–1)
BNP SERPL-MCNC: 190 PG/ML (ref 0–67)
BNP SERPL-MCNC: 199 PG/ML (ref 0–65)
BNP SERPL-MCNC: 456 PG/ML (ref 0–67)
BUN SERPL-MCNC: 14 MG/DL (ref 8–28)
BUN SERPL-MCNC: 15 MG/DL (ref 8–28)
BUN SERPL-MCNC: 17 MG/DL (ref 8–22)
BUN SERPL-MCNC: 17 MG/DL (ref 8–28)
BUN SERPL-MCNC: 18 MG/DL (ref 8–28)
BUN SERPL-MCNC: 18 MG/DL (ref 8–28)
BUN SERPL-MCNC: 19 MG/DL (ref 8–28)
BUN SERPL-MCNC: 20 MG/DL (ref 8–22)
BUN SERPL-MCNC: 25 MG/DL (ref 8–28)
BUN SERPL-MCNC: 30 MG/DL (ref 8–28)
CALCIUM SERPL-MCNC: 7.4 MG/DL (ref 8.5–10.5)
CALCIUM SERPL-MCNC: 7.8 MG/DL (ref 8.5–10.5)
CALCIUM SERPL-MCNC: 8.1 MG/DL (ref 8.5–10.5)
CALCIUM SERPL-MCNC: 8.5 MG/DL (ref 8.5–10.5)
CALCIUM SERPL-MCNC: 8.6 MG/DL (ref 8.5–10.5)
CALCIUM SERPL-MCNC: 9.3 MG/DL (ref 8.5–10.5)
CHLORIDE BLD-SCNC: 101 MMOL/L (ref 98–107)
CHLORIDE BLD-SCNC: 101 MMOL/L (ref 98–107)
CHLORIDE BLD-SCNC: 102 MMOL/L (ref 98–107)
CHLORIDE BLD-SCNC: 102 MMOL/L (ref 98–107)
CHLORIDE BLD-SCNC: 103 MMOL/L (ref 98–107)
CHLORIDE BLD-SCNC: 104 MMOL/L (ref 98–107)
CHLORIDE BLD-SCNC: 106 MMOL/L (ref 98–107)
CHLORIDE BLD-SCNC: 108 MMOL/L (ref 98–107)
CHOLEST SERPL-MCNC: 152 MG/DL
CO2 SERPL-SCNC: 23 MMOL/L (ref 22–31)
CO2 SERPL-SCNC: 24 MMOL/L (ref 22–31)
CO2 SERPL-SCNC: 25 MMOL/L (ref 22–31)
CO2 SERPL-SCNC: 26 MMOL/L (ref 22–31)
CO2 SERPL-SCNC: 27 MMOL/L (ref 22–31)
CO2 SERPL-SCNC: 28 MMOL/L (ref 22–31)
CO2 SERPL-SCNC: 28 MMOL/L (ref 22–31)
CO2 SERPL-SCNC: 29 MMOL/L (ref 22–31)
CREAT BLD-MCNC: 0.9 MG/DL
CREAT BLD-MCNC: 1 MG/DL
CREAT SERPL-MCNC: 0.76 MG/DL (ref 0.7–1.3)
CREAT SERPL-MCNC: 0.77 MG/DL (ref 0.7–1.3)
CREAT SERPL-MCNC: 0.83 MG/DL (ref 0.7–1.3)
CREAT SERPL-MCNC: 0.83 MG/DL (ref 0.7–1.3)
CREAT SERPL-MCNC: 0.84 MG/DL (ref 0.7–1.3)
CREAT SERPL-MCNC: 0.85 MG/DL (ref 0.7–1.3)
CREAT SERPL-MCNC: 0.88 MG/DL (ref 0.7–1.3)
CREAT SERPL-MCNC: 1.07 MG/DL (ref 0.7–1.3)
CREAT SERPL-MCNC: 1.17 MG/DL (ref 0.7–1.3)
CREAT SERPL-MCNC: 1.71 MG/DL (ref 0.7–1.3)
EOSINOPHIL # BLD AUTO: 0 THOU/UL (ref 0–0.4)
EOSINOPHIL # BLD AUTO: 0.2 THOU/UL (ref 0–0.4)
EOSINOPHIL # BLD AUTO: 0.4 THOU/UL (ref 0–0.4)
EOSINOPHIL # BLD AUTO: 0.5 THOU/UL (ref 0–0.4)
EOSINOPHIL # BLD AUTO: 0.6 THOU/UL (ref 0–0.4)
EOSINOPHIL # BLD AUTO: 0.7 THOU/UL (ref 0–0.4)
EOSINOPHIL # BLD AUTO: 0.7 THOU/UL (ref 0–0.4)
EOSINOPHIL NFR BLD AUTO: 1 % (ref 0–6)
EOSINOPHIL NFR BLD AUTO: 1 % (ref 0–6)
EOSINOPHIL NFR BLD AUTO: 2 % (ref 0–6)
EOSINOPHIL NFR BLD AUTO: 2 % (ref 0–6)
EOSINOPHIL NFR BLD AUTO: 4 % (ref 0–6)
EOSINOPHIL NFR BLD AUTO: 5 % (ref 0–6)
EOSINOPHIL NFR BLD AUTO: 5 % (ref 0–6)
EOSINOPHIL NFR BLD AUTO: 6 % (ref 0–6)
EOSINOPHIL NFR BLD AUTO: 6 % (ref 0–6)
ERYTHROCYTE [DISTWIDTH] IN BLOOD BY AUTOMATED COUNT: 13 % (ref 11–14.5)
ERYTHROCYTE [DISTWIDTH] IN BLOOD BY AUTOMATED COUNT: 13.7 % (ref 11–14.5)
ERYTHROCYTE [DISTWIDTH] IN BLOOD BY AUTOMATED COUNT: 13.8 % (ref 11–14.5)
ERYTHROCYTE [DISTWIDTH] IN BLOOD BY AUTOMATED COUNT: 15.2 % (ref 11–14.5)
ERYTHROCYTE [DISTWIDTH] IN BLOOD BY AUTOMATED COUNT: 15.8 % (ref 11–14.5)
ERYTHROCYTE [DISTWIDTH] IN BLOOD BY AUTOMATED COUNT: 16.4 % (ref 11–14.5)
ERYTHROCYTE [DISTWIDTH] IN BLOOD BY AUTOMATED COUNT: 17.6 % (ref 11–14.5)
ERYTHROCYTE [DISTWIDTH] IN BLOOD BY AUTOMATED COUNT: 18.4 % (ref 11–14.5)
ERYTHROCYTE [DISTWIDTH] IN BLOOD BY AUTOMATED COUNT: 18.4 % (ref 11–14.5)
ERYTHROCYTE [DISTWIDTH] IN BLOOD BY AUTOMATED COUNT: 20 % (ref 11–14.5)
ERYTHROCYTE [SEDIMENTATION RATE] IN BLOOD BY WESTERGREN METHOD: 80 MM/HR (ref 0–15)
FASTING STATUS PATIENT QL REPORTED: NO
FERRITIN SERPL-MCNC: 1212 NG/ML (ref 27–300)
FOLATE SERPL-MCNC: 12.2 NG/ML
GFR SERPL CREATININE-BSD FRML MDRD: 40 ML/MIN/1.73M2
GFR SERPL CREATININE-BSD FRML MDRD: >60 ML/MIN/1.73M2
GLUCOSE BLD-MCNC: 102 MG/DL (ref 70–125)
GLUCOSE BLD-MCNC: 104 MG/DL (ref 70–125)
GLUCOSE BLD-MCNC: 105 MG/DL (ref 70–125)
GLUCOSE BLD-MCNC: 109 MG/DL (ref 70–125)
GLUCOSE BLD-MCNC: 113 MG/DL (ref 70–125)
GLUCOSE BLD-MCNC: 121 MG/DL (ref 70–125)
GLUCOSE BLD-MCNC: 127 MG/DL (ref 70–125)
GLUCOSE BLD-MCNC: 160 MG/DL (ref 70–125)
GLUCOSE BLD-MCNC: 168 MG/DL (ref 70–125)
GLUCOSE BLD-MCNC: 176 MG/DL (ref 70–125)
HAPTOGLOB SERPL-MCNC: 323 MG/DL (ref 33–171)
HCT VFR BLD AUTO: 24.8 % (ref 40–54)
HCT VFR BLD AUTO: 26.3 % (ref 40–54)
HCT VFR BLD AUTO: 26.3 % (ref 40–54)
HCT VFR BLD AUTO: 26.5 % (ref 40–54)
HCT VFR BLD AUTO: 27.6 % (ref 40–54)
HCT VFR BLD AUTO: 27.6 % (ref 40–54)
HCT VFR BLD AUTO: 28.9 % (ref 40–54)
HCT VFR BLD AUTO: 29 % (ref 40–54)
HCT VFR BLD AUTO: 30.2 % (ref 40–54)
HCT VFR BLD AUTO: 31.5 % (ref 40–54)
HDLC SERPL-MCNC: 77 MG/DL
HGB BLD-MCNC: 10.5 G/DL (ref 14–18)
HGB BLD-MCNC: 11.5 G/DL
HGB BLD-MCNC: 6.7 G/DL (ref 14–18)
HGB BLD-MCNC: 7.9 G/DL (ref 14–18)
HGB BLD-MCNC: 7.9 G/DL (ref 14–18)
HGB BLD-MCNC: 8 G/DL (ref 14–18)
HGB BLD-MCNC: 8.3 G/DL (ref 14–18)
HGB BLD-MCNC: 8.3 G/DL (ref 14–18)
HGB BLD-MCNC: 8.4 G/DL (ref 14–18)
HGB BLD-MCNC: 9 G/DL (ref 14–18)
HGB BLD-MCNC: 9.1 G/DL (ref 14–18)
HGB BLD-MCNC: 9.3 G/DL (ref 14–18)
HGB BLD-MCNC: 9.8 G/DL (ref 14–18)
INR PPP: 1.08 (ref 0.9–1.1)
INR PPP: 1.12 (ref 0.9–1.1)
IRON SATN MFR SERPL: 20 % (ref 20–50)
IRON SERPL-MCNC: 57 UG/DL (ref 42–175)
LDH SERPL L TO P-CCNC: 234 U/L (ref 125–220)
LDLC SERPL CALC-MCNC: 28 MG/DL
LYMPHOCYTES # BLD AUTO: 0.8 THOU/UL (ref 0.8–4.4)
LYMPHOCYTES # BLD AUTO: 0.8 THOU/UL (ref 0.8–4.4)
LYMPHOCYTES # BLD AUTO: 1 THOU/UL (ref 0.8–4.4)
LYMPHOCYTES # BLD AUTO: 1.1 THOU/UL (ref 0.8–4.4)
LYMPHOCYTES # BLD AUTO: 1.3 THOU/UL (ref 0.8–4.4)
LYMPHOCYTES # BLD AUTO: 1.4 THOU/UL (ref 0.8–4.4)
LYMPHOCYTES # BLD AUTO: 1.7 THOU/UL (ref 0.8–4.4)
LYMPHOCYTES # BLD AUTO: 1.8 THOU/UL (ref 0.8–4.4)
LYMPHOCYTES # BLD AUTO: 1.8 THOU/UL (ref 0.8–4.4)
LYMPHOCYTES NFR BLD AUTO: 10 % (ref 20–40)
LYMPHOCYTES NFR BLD AUTO: 14 % (ref 20–40)
LYMPHOCYTES NFR BLD AUTO: 15 % (ref 20–40)
LYMPHOCYTES NFR BLD AUTO: 16 % (ref 20–40)
LYMPHOCYTES NFR BLD AUTO: 17 % (ref 20–40)
LYMPHOCYTES NFR BLD AUTO: 28 % (ref 20–40)
LYMPHOCYTES NFR BLD AUTO: 4 % (ref 20–40)
LYMPHOCYTES NFR BLD AUTO: 5 % (ref 20–40)
LYMPHOCYTES NFR BLD AUTO: 6 % (ref 20–40)
MAGNESIUM SERPL-MCNC: 1.2 MG/DL (ref 1.8–2.6)
MAGNESIUM SERPL-MCNC: 1.2 MG/DL (ref 1.8–2.6)
MAGNESIUM SERPL-MCNC: 1.5 MG/DL (ref 1.8–2.6)
MAGNESIUM SERPL-MCNC: 1.6 MG/DL (ref 1.8–2.6)
MCH RBC QN AUTO: 30 PG (ref 27–34)
MCH RBC QN AUTO: 30.9 PG (ref 27–34)
MCH RBC QN AUTO: 31.1 PG (ref 27–34)
MCH RBC QN AUTO: 31.2 PG (ref 27–34)
MCH RBC QN AUTO: 31.2 PG (ref 27–34)
MCH RBC QN AUTO: 31.4 PG (ref 27–34)
MCH RBC QN AUTO: 31.6 PG (ref 27–34)
MCH RBC QN AUTO: 31.7 PG (ref 27–34)
MCH RBC QN AUTO: 32.3 PG (ref 27–34)
MCH RBC QN AUTO: 34.5 PG (ref 27–34)
MCHC RBC AUTO-ENTMCNC: 30 G/DL (ref 32–36)
MCHC RBC AUTO-ENTMCNC: 30.1 G/DL (ref 32–36)
MCHC RBC AUTO-ENTMCNC: 30.4 G/DL (ref 32–36)
MCHC RBC AUTO-ENTMCNC: 30.4 G/DL (ref 32–36)
MCHC RBC AUTO-ENTMCNC: 30.8 G/DL (ref 32–36)
MCHC RBC AUTO-ENTMCNC: 31.1 G/DL (ref 32–36)
MCHC RBC AUTO-ENTMCNC: 31.3 G/DL (ref 32–36)
MCHC RBC AUTO-ENTMCNC: 31.4 G/DL (ref 32–36)
MCHC RBC AUTO-ENTMCNC: 31.9 G/DL (ref 32–36)
MCHC RBC AUTO-ENTMCNC: 33.3 G/DL (ref 32–36)
MCV RBC AUTO: 100 FL (ref 80–100)
MCV RBC AUTO: 101 FL (ref 80–100)
MCV RBC AUTO: 101 FL (ref 80–100)
MCV RBC AUTO: 102 FL (ref 80–100)
MCV RBC AUTO: 102 FL (ref 80–100)
MCV RBC AUTO: 103 FL (ref 80–100)
MCV RBC AUTO: 104 FL (ref 80–100)
MCV RBC AUTO: 104 FL (ref 80–100)
MCV RBC AUTO: 105 FL (ref 80–100)
MCV RBC AUTO: 96 FL (ref 80–100)
MONOCYTES # BLD AUTO: 0.3 THOU/UL (ref 0–0.9)
MONOCYTES # BLD AUTO: 0.4 THOU/UL (ref 0–0.9)
MONOCYTES # BLD AUTO: 0.6 THOU/UL (ref 0–0.9)
MONOCYTES # BLD AUTO: 0.6 THOU/UL (ref 0–0.9)
MONOCYTES # BLD AUTO: 0.7 THOU/UL (ref 0–0.9)
MONOCYTES # BLD AUTO: 0.8 THOU/UL (ref 0–0.9)
MONOCYTES # BLD AUTO: 1 THOU/UL (ref 0–0.9)
MONOCYTES # BLD AUTO: 1.1 THOU/UL (ref 0–0.9)
MONOCYTES # BLD AUTO: 1.2 THOU/UL (ref 0–0.9)
MONOCYTES NFR BLD AUTO: 10 % (ref 2–10)
MONOCYTES NFR BLD AUTO: 10 % (ref 2–10)
MONOCYTES NFR BLD AUTO: 12 % (ref 2–10)
MONOCYTES NFR BLD AUTO: 2 % (ref 2–10)
MONOCYTES NFR BLD AUTO: 3 % (ref 2–10)
MONOCYTES NFR BLD AUTO: 3 % (ref 2–10)
MONOCYTES NFR BLD AUTO: 6 % (ref 2–10)
MONOCYTES NFR BLD AUTO: 7 % (ref 2–10)
MONOCYTES NFR BLD AUTO: 9 % (ref 2–10)
NEUTROPHILS # BLD AUTO: 1.7 THOU/UL (ref 2–7.7)
NEUTROPHILS # BLD AUTO: 10 THOU/UL (ref 2–7.7)
NEUTROPHILS # BLD AUTO: 12.7 THOU/UL (ref 2–7.7)
NEUTROPHILS # BLD AUTO: 17.8 THOU/UL (ref 2–7.7)
NEUTROPHILS # BLD AUTO: 21.1 THOU/UL (ref 2–7.7)
NEUTROPHILS # BLD AUTO: 6.6 THOU/UL (ref 2–7.7)
NEUTROPHILS # BLD AUTO: 7 THOU/UL (ref 2–7.7)
NEUTROPHILS # BLD AUTO: 7.7 THOU/UL (ref 2–7.7)
NEUTROPHILS # BLD AUTO: 9.1 THOU/UL (ref 2–7.7)
NEUTROPHILS NFR BLD AUTO: 59 % (ref 50–70)
NEUTROPHILS NFR BLD AUTO: 68 % (ref 50–70)
NEUTROPHILS NFR BLD AUTO: 69 % (ref 50–70)
NEUTROPHILS NFR BLD AUTO: 72 % (ref 50–70)
NEUTROPHILS NFR BLD AUTO: 73 % (ref 50–70)
NEUTROPHILS NFR BLD AUTO: 78 % (ref 50–70)
NEUTROPHILS NFR BLD AUTO: 90 % (ref 50–70)
NEUTROPHILS NFR BLD AUTO: 90 % (ref 50–70)
NEUTROPHILS NFR BLD AUTO: 91 % (ref 50–70)
PLATELET # BLD AUTO: 109 THOU/UL (ref 140–440)
PLATELET # BLD AUTO: 124 THOU/UL (ref 140–440)
PLATELET # BLD AUTO: 158 THOU/UL (ref 140–440)
PLATELET # BLD AUTO: 161 THOU/UL (ref 140–440)
PLATELET # BLD AUTO: 163 THOU/UL (ref 140–440)
PLATELET # BLD AUTO: 179 THOU/UL (ref 140–440)
PLATELET # BLD AUTO: 213 THOU/UL (ref 140–440)
PLATELET # BLD AUTO: 230 THOU/UL (ref 140–440)
PLATELET # BLD AUTO: 247 THOU/UL (ref 140–440)
PLATELET # BLD AUTO: 265 THOU/UL (ref 140–440)
PLATELET # BLD AUTO: 321 THOU/UL (ref 140–440)
PMV BLD AUTO: 10.1 FL (ref 8.5–12.5)
PMV BLD AUTO: 10.3 FL (ref 8.5–12.5)
PMV BLD AUTO: 10.4 FL (ref 8.5–12.5)
PMV BLD AUTO: 10.5 FL (ref 8.5–12.5)
PMV BLD AUTO: 10.6 FL (ref 8.5–12.5)
PMV BLD AUTO: 10.7 FL (ref 8.5–12.5)
PMV BLD AUTO: 11.6 FL (ref 8.5–12.5)
PMV BLD AUTO: 9.5 FL (ref 8.5–12.5)
PMV BLD AUTO: 9.8 FL (ref 8.5–12.5)
PMV BLD AUTO: 9.8 FL (ref 8.5–12.5)
POC GFR AMER AF HE - HISTORICAL: >60 ML/MIN/1.73M2
POC GFR AMER AF HE - HISTORICAL: >60 ML/MIN/1.73M2
POC GFR NON AMER AF HE - HISTORICAL: >60 ML/MIN/1.73M2
POC GFR NON AMER AF HE - HISTORICAL: >60 ML/MIN/1.73M2
POTASSIUM BLD-SCNC: 3.8 MMOL/L (ref 3.5–5)
POTASSIUM BLD-SCNC: 3.9 MMOL/L (ref 3.5–5)
POTASSIUM BLD-SCNC: 4.2 MMOL/L (ref 3.5–5)
POTASSIUM BLD-SCNC: 4.3 MMOL/L (ref 3.5–5)
POTASSIUM BLD-SCNC: 4.3 MMOL/L (ref 3.5–5)
POTASSIUM BLD-SCNC: 4.5 MMOL/L (ref 3.5–5)
POTASSIUM BLD-SCNC: 4.6 MMOL/L (ref 3.5–5)
POTASSIUM BLD-SCNC: 4.6 MMOL/L (ref 3.5–5)
POTASSIUM BLD-SCNC: 4.7 MMOL/L (ref 3.5–5)
POTASSIUM BLD-SCNC: 5.3 MMOL/L (ref 3.5–5)
PROT SERPL-MCNC: 5.4 G/DL (ref 6–8)
PROT SERPL-MCNC: 5.7 G/DL (ref 6–8)
PROT SERPL-MCNC: 5.8 G/DL (ref 6–8)
PROT SERPL-MCNC: 5.8 G/DL (ref 6–8)
PROT SERPL-MCNC: 5.9 G/DL (ref 6–8)
PROT SERPL-MCNC: 5.9 G/DL (ref 6–8)
PROT SERPL-MCNC: 6.2 G/DL (ref 6–8)
PROT SERPL-MCNC: 6.3 G/DL (ref 6–8)
RBC # BLD AUTO: 2.49 MILL/UL (ref 4.4–6.2)
RBC # BLD AUTO: 2.53 MILL/UL (ref 4.4–6.2)
RBC # BLD AUTO: 2.57 MILL/UL (ref 4.4–6.2)
RBC # BLD AUTO: 2.59 MILL/UL (ref 4.4–6.2)
RBC # BLD AUTO: 2.63 MILL/UL (ref 4.4–6.2)
RBC # BLD AUTO: 2.7 MILL/UL (ref 4.4–6.2)
RBC # BLD AUTO: 2.87 MILL/UL (ref 4.4–6.2)
RBC # BLD AUTO: 2.98 MILL/UL (ref 4.4–6.2)
RBC # BLD AUTO: 3.03 MILL/UL (ref 4.4–6.2)
RBC # BLD AUTO: 3.04 MILL/UL (ref 4.4–6.2)
RETICS # AUTO: 0.12 MILL/UL (ref 0.01–0.11)
SODIUM SERPL-SCNC: 137 MMOL/L (ref 136–145)
SODIUM SERPL-SCNC: 137 MMOL/L (ref 136–145)
SODIUM SERPL-SCNC: 138 MMOL/L (ref 136–145)
SODIUM SERPL-SCNC: 140 MMOL/L (ref 136–145)
SODIUM SERPL-SCNC: 141 MMOL/L (ref 136–145)
SODIUM SERPL-SCNC: 142 MMOL/L (ref 136–145)
SODIUM SERPL-SCNC: 142 MMOL/L (ref 136–145)
SODIUM SERPL-SCNC: 144 MMOL/L (ref 136–145)
TIBC SERPL-MCNC: 284 UG/DL (ref 313–563)
TRANSFERRIN SERPL-MCNC: 227 MG/DL (ref 212–360)
TRIGL SERPL-MCNC: 234 MG/DL
TSH SERPL DL<=0.005 MIU/L-ACNC: 1.32 UIU/ML (ref 0.3–5)
VIT B12 SERPL-MCNC: 536 PG/ML (ref 213–816)
WBC: 10.3 THOU/UL (ref 4–11)
WBC: 11.7 THOU/UL (ref 4–11)
WBC: 12.8 THOU/UL (ref 4–11)
WBC: 13 THOU/UL (ref 4–11)
WBC: 13 THOU/UL (ref 4–11)
WBC: 14.4 THOU/UL (ref 4–11)
WBC: 2.8 THOU/UL (ref 4–11)
WBC: 20.4 THOU/UL (ref 4–11)
WBC: 24 THOU/UL (ref 4–11)
WBC: 9.2 THOU/UL (ref 4–11)

## 2018-01-01 ASSESSMENT — MIFFLIN-ST. JEOR
SCORE: 1520.57
SCORE: 1556.4
SCORE: 1520.57
SCORE: 1570.01
SCORE: 1584.52
SCORE: 1539.62
SCORE: 1557.31
SCORE: 1597.68
SCORE: 1497.89
SCORE: 1593.14

## 2018-09-04 ENCOUNTER — COMMUNICATION - HEALTHEAST (OUTPATIENT)
Dept: FAMILY MEDICINE | Facility: CLINIC | Age: 68
End: 2018-09-04

## 2021-05-26 ENCOUNTER — RECORDS - HEALTHEAST (OUTPATIENT)
Dept: ADMINISTRATIVE | Facility: CLINIC | Age: 71
End: 2021-05-26

## 2021-05-28 ENCOUNTER — RECORDS - HEALTHEAST (OUTPATIENT)
Dept: ADMINISTRATIVE | Facility: CLINIC | Age: 71
End: 2021-05-28

## 2021-05-30 VITALS — BODY MASS INDEX: 32.21 KG/M2 | WEIGHT: 200.4 LBS | HEIGHT: 66 IN

## 2021-05-30 VITALS — WEIGHT: 208.56 LBS | HEIGHT: 67 IN | BODY MASS INDEX: 32.73 KG/M2

## 2021-05-30 VITALS — BODY MASS INDEX: 32.43 KG/M2 | HEIGHT: 66 IN | WEIGHT: 201.8 LBS

## 2021-05-30 VITALS — HEIGHT: 66 IN | BODY MASS INDEX: 34.72 KG/M2 | WEIGHT: 216 LBS

## 2021-05-30 VITALS — WEIGHT: 207 LBS | BODY MASS INDEX: 33.27 KG/M2 | HEIGHT: 66 IN

## 2021-05-30 VITALS — WEIGHT: 212 LBS | HEIGHT: 66 IN | BODY MASS INDEX: 34.07 KG/M2

## 2021-05-30 VITALS — WEIGHT: 216.8 LBS | BODY MASS INDEX: 34.99 KG/M2

## 2021-05-31 VITALS — WEIGHT: 194.8 LBS | BODY MASS INDEX: 31.44 KG/M2

## 2021-05-31 VITALS — WEIGHT: 200 LBS | BODY MASS INDEX: 32.28 KG/M2

## 2021-05-31 VITALS — BODY MASS INDEX: 32.35 KG/M2 | HEIGHT: 66 IN | WEIGHT: 201.3 LBS

## 2021-05-31 VITALS — WEIGHT: 202.9 LBS | BODY MASS INDEX: 32.61 KG/M2 | HEIGHT: 66 IN

## 2021-05-31 VITALS — BODY MASS INDEX: 32.6 KG/M2 | WEIGHT: 202 LBS

## 2021-05-31 VITALS — BODY MASS INDEX: 31.43 KG/M2 | WEIGHT: 195.56 LBS | HEIGHT: 66 IN

## 2021-05-31 VITALS — BODY MASS INDEX: 32.07 KG/M2 | WEIGHT: 198.7 LBS

## 2021-06-01 VITALS — BODY MASS INDEX: 31.6 KG/M2 | WEIGHT: 196.6 LBS | HEIGHT: 66 IN

## 2021-06-01 VITALS — WEIGHT: 190.6 LBS | HEIGHT: 66 IN | BODY MASS INDEX: 30.63 KG/M2

## 2021-06-01 VITALS — WEIGHT: 180 LBS | BODY MASS INDEX: 28.62 KG/M2

## 2021-06-01 VITALS — HEIGHT: 66 IN | BODY MASS INDEX: 32.06 KG/M2 | WEIGHT: 199.5 LBS

## 2021-06-01 VITALS — HEIGHT: 67 IN | WEIGHT: 179 LBS | BODY MASS INDEX: 28.09 KG/M2

## 2021-06-01 VITALS — BODY MASS INDEX: 31.08 KG/M2 | WEIGHT: 193.4 LBS | HEIGHT: 66 IN

## 2021-06-01 VITALS — HEIGHT: 67 IN | BODY MASS INDEX: 27.31 KG/M2 | WEIGHT: 174 LBS

## 2021-06-01 VITALS — WEIGHT: 193.6 LBS | BODY MASS INDEX: 31.25 KG/M2

## 2021-06-01 VITALS — WEIGHT: 198.5 LBS | BODY MASS INDEX: 31.9 KG/M2 | HEIGHT: 66 IN

## 2021-06-01 VITALS — WEIGHT: 179.4 LBS | BODY MASS INDEX: 28.1 KG/M2

## 2021-06-01 VITALS — WEIGHT: 191 LBS | BODY MASS INDEX: 30.83 KG/M2

## 2021-06-01 VITALS — BODY MASS INDEX: 28.62 KG/M2 | WEIGHT: 180 LBS

## 2021-06-01 VITALS — WEIGHT: 181 LBS | BODY MASS INDEX: 28.78 KG/M2

## 2021-06-01 VITALS — BODY MASS INDEX: 30.01 KG/M2 | WEIGHT: 186.7 LBS | HEIGHT: 66 IN

## 2021-06-01 VITALS — WEIGHT: 178 LBS | BODY MASS INDEX: 28.73 KG/M2

## 2021-06-01 VITALS — BODY MASS INDEX: 28.09 KG/M2 | HEIGHT: 67 IN | WEIGHT: 179 LBS

## 2021-06-01 VITALS — WEIGHT: 171 LBS | BODY MASS INDEX: 27.6 KG/M2

## 2021-06-01 VITALS — BODY MASS INDEX: 30.02 KG/M2 | WEIGHT: 186 LBS

## 2021-06-01 VITALS — BODY MASS INDEX: 28.99 KG/M2 | WEIGHT: 179.6 LBS

## 2021-06-01 VITALS — BODY MASS INDEX: 28.74 KG/M2 | WEIGHT: 180.8 LBS

## 2021-06-01 VITALS — BODY MASS INDEX: 28.68 KG/M2 | WEIGHT: 180.38 LBS

## 2021-06-01 VITALS — WEIGHT: 190.4 LBS | HEIGHT: 66 IN | BODY MASS INDEX: 30.6 KG/M2

## 2021-06-05 ENCOUNTER — RECORDS - HEALTHEAST (OUTPATIENT)
Dept: ONCOLOGY | Facility: HOSPITAL | Age: 71
End: 2021-06-05

## 2021-06-05 DIAGNOSIS — C34.90 MALIGNANT NEOPLASM OF BRONCHUS AND LUNG (H): ICD-10-CM

## 2021-06-05 DIAGNOSIS — Z85.118 PERSONAL HISTORY OF MALIGNANT NEOPLASM OF BRONCHUS AND LUNG: ICD-10-CM

## 2021-06-08 NOTE — PROGRESS NOTES
"  Subjective:       Chrsitian Rees is a 66 y.o. male who presents for primarily evaluation of bilateral lower extremity edema.  This has been worse for the past 3 weeks.  Patient has seen his previous primary provider and has been evaluated in the emergency department.  Last week, in the ED, he was even evaluated with bilateral lower extremity ultrasounds for DVTs and a CTA of his chest.  This was negative for DVT or PE.  He actually reports that with IV magnesium supplementation his shortness of breath seems to have improved.  He denies any shortness of breath on laying flat, does not wake short of breath.  He does wear a CPAP device.  To review his recent history, his primary provider performed labs including BNP which is only very mildly elevated.  His magnesium was found to be low by his oncologist Dr. Li and his furosemide was held.  This was replaced IV.  He was also found to be anemic and so chemotherapy is currently on hold.  Patient receives palliative chemotherapy for lung cancer.      The following portions of the patient's history were reviewed and updated as appropriate: allergies, current medications and problem list.    Review of Systems   Pertinent items are noted in HPI.      Objective:        Visit Vitals     BP (!) 136/92 (Patient Site: Left Arm, Patient Position: Sitting, Cuff Size: Adult Regular)     Pulse 96     Temp 98  F (36.7  C) (Oral)     Resp 24     Ht 5' 6\" (1.676 m)     Wt 212 lb (96.2 kg)     SpO2 98%     BMI 34.22 kg/m2       General appearance: alert, appears stated age and cooperative  Lungs: clear to auscultation bilaterally  Heart: regular rate and rhythm, S1, S2 normal, no murmur, click, rub or gallop  Extremities: 2+ pitting edema from the knees downward, mild erythema and blistering bilaterally        Assessment/Plan:       1. Localized edema  Discussed with patient the need to pursue a high protein diet given his low albumin.  Discussed the etiology of fluid " leaking into the tissues.  Wrapped both legs with Ace wraps today.  He has an upcoming appointment with lymphedema clinic.  He is back on his furosemide at his baseline dose.  Amlodipine was stopped as it was thought this may be contributing to his edema.    2. Essential hypertension with goal blood pressure less than 140/90  Unclear if patient is still taking his lisinopril twice daily.  Plan recheck at his upcoming vascular appointment as his blood pressures have been within goal range at recent previous visits.

## 2021-06-08 NOTE — PROGRESS NOTES
Christian Rees is here for his B12 injection.    Billie Rodríguez, [facility] 1/4/2017 9:54 AM

## 2021-06-08 NOTE — PROGRESS NOTES
[facility] Hematology and Oncology Progress Note    Patient: Christian Rees  MRN: [id number]  Date of Service: 02/06/2017        Reason for Visit    Chief Complaint   Patient presents with     HE Cancer     Malignant neoplasm of lower lobe of left lung - follow up       Assessment and Plan    Metastatic adenocarcinoma of the lung with lymph node and bone metastases diagnosed by biopsy in February 2016  Dyspnea on exertion  Hypertension  Anxiety/depression  Low magnesium  Anemia    CT scans reviewed and they are showing complete remission.  We will continue with just observation alone.  I will see him again in 2 months for follow-up with repeat lab work.    He has element of white coat hypertension as his blood pressure is otherwise fairly well controlled.    His shortness of breath and lower extremity edema related to diastolic dysfunction.  He will continue Lasix.  I asked him to increase protein in his diet.  He will follow-up with cardiology and will be seeing Dr. Oneill as well for his edema.    I asked him to continue magnesium and his hemoglobin is improving.    Plan: Follow-up in 2 months with repeat CT scans and lab work    Measurable disease: CT of the chest          Current therapy: Observation        Treatment history:   Alimta maintenance, 8 cycles completed in December 2016    Patient has completed 5 cycles of carboplatin and Alimta with dose reduction for recurrent and metastatic lung cancer, last cycle in June 2016  Left lower lobectomy in April 2013   He then received 4 cycles of chemotherapy as part of the ECOG 1505 clinical trial   He also received maintenance Avastin through December 2013 on the same clinical trial, this was stopped because of hip surgery   Lung cancer    Staging form: Lung, AJCC 7th Edition      Clinical: Stage IV (T1b, N3, M1b) - Signed by Erica Borrero CNP on 3/14/2016    ECOG Performance   ECOG Performance Status: 1    Distress Assessment  Distress Assessment  "Score: 2    Pain  Pain Score (Initial OR Reassessment): 5        Problem List    1. Malignant neoplasm of lower lobe of lung, unspecified laterality  CT Chest Abdomen Pelvis Without Oral With IV Contrast    HM1(CBC and Differential)    Comprehensive Metabolic Panel    Magnesium        CC: Cristela De Jesus MD    ______________________________________________________________________________    History of Present Illness    Mr. Christian Rees returns for evaluation.  He was seen 2 months ago.  He continues to have issues with bilateral lower extremity edema.  He will be seeing a specialist for this.  He also has follow-up with cardiology.  He does have shortness of breath.  No headaches.  No fever or mouth sores.  No dysphagia or odynophagia.  Appetite and weight are stable.  ECOG status is 1.  No change in bowels or urine.  No pain issues.    Pain Status  Currently in Pain: Yes    Review of Systems    Constitutional  Constitutional (WDL): Exceptions to WDL  Fatigue: Fatigue relieved by rest  Weight Gain: 5 - <10% from baseline (Up 4 lbs. since 12/12)  Neurosensory  Neurosensory (WDL): Exceptions to WDL  Ataxia: Asymptomatic, clinical or diagnostic observations only, intervention not indicated  Cardiovascular  Cardiovascular (WDL): Exceptions to WDL  Edema: Yes  Edema Limbs: Grade 2 (BIlat legs; on Lasix.)  Pulmonary  Respiratory (WDL): Exceptions to WDL  Dyspnea: Shortness of breath with minimal exertion, limiting instrumental ADL  Gastrointestinal  Gastrointestinal (WDL): Exceptions to WDL  Diarrhea: Increase of <4 stools per day over baseline, mild increase in ostomy output compared to baseline (\"Stools are always loose.\")  Genitourinary  Genitourinary (WDL): All genitourinary elements are within defined limits  Integumentary  Integumentary (WDL): All integumentary elements are within defined limits  Patient Coping  Patient Coping: Accepting;Anxiety  Distress Assessment  Distress Assessment Score: " 2  Accompanied by  Accompanied by: Alone    Past History  Past Medical History:   Diagnosis Date     Anxiety      Closed Posterior Dislocation Of The Hip     Created by Conversion      Depression      DVT (deep venous thrombosis)      Gout      Heart failure      Hypertension      Lung cancer     Left lung     Persistent Atrial Fibrillation     Created by Conversion [facility] Annotation: Apr 17 2014 11:48AM - Deedee Holbrook: 3/14 new afib  with contr VR with hip replacement [id number] score of  3 with 1  point each for age 65 and older, HTN and CHF hx---new warfarin start          Past Surgical History:   Procedure Laterality Date     LUNG SURGERY       LA CARDIOVERSION ELECTIVE ARRHYTHMIA EXTERNAL      Description: Elective Cardioversion External;  Recorded: 05/16/2014;  Comments: 4/25/14     LA REMOVAL OF LUNG,LOBECTOMY      Description: Lung Lobectomy;  Proc Date: 01/01/2013;  Comments: left lower     LA TOTAL HIP ARTHROPLASTY Bilateral Left (2011), right (2012)     Description: Total Hip Replacement;  Recorded: 01/11/2013;  Comments: right 2003; ; left 2007 x2     SHOULDER SURGERY Left 2009?    rotator cuff repair       Physical Exam    Recent Vitals 2/6/2017   Height -   Weight 216 lbs 13 oz   BSA (m2) -   /88   Pulse 96   Temp 97.6   Temp src 1   SpO2 99       GENERAL: Alert and oriented. Seated comfortably. In no distress.    HEAD: Atraumatic and normocephalic.  Has a full head of hair.    EYES: JULIET, EOMI.  No pallor.  No icterus.    Oral cavity: no mucosal lesion or tonsillar enlargement.    NECK: supple. JVP normal.  No thyroid enlargement.    LYMPH NODES: No palpable, cervical, axillary or inguinal lymphadenopathy.    CHEST: clear to auscultation bilaterally.  Resonant to percussion throughout bilaterally.  Symmetrical breath movements bilaterally.    CVS: S1 and S2 are heard. Regular rate and rhythm.  No murmur or gallop or rub heard.    ABDOMEN: Soft. Not tender. Not distended.  No  palpable hepatomegaly or splenomegaly.  No other mass palpable.  Bowel sounds heard.    EXTREMITIES: Warm.  1+  edema.    SKIN: no rash, or bruising or purpura.        Lab Results    Recent Results (from the past 168 hour(s))   Magnesium   Result Value Ref Range    Magnesium 1.2 (L) 1.8 - 2.6 mg/dL   Comprehensive Metabolic Panel   Result Value Ref Range    Sodium 142 136 - 145 mmol/L    Potassium 3.9 3.5 - 5.0 mmol/L    Chloride 105 98 - 107 mmol/L    CO2 24 22 - 31 mmol/L    Anion Gap, Calculation 13 5 - 18 mmol/L    Glucose 156 (H) 70 - 125 mg/dL    BUN 12 8 - 22 mg/dL    Creatinine 1.06 0.70 - 1.30 mg/dL    GFR MDRD Af Amer >60 >60 mL/min/1.73m2    GFR MDRD Non Af Amer >60 >60 mL/min/1.73m2    Bilirubin, Total 0.3 0.0 - 1.0 mg/dL    Calcium 8.9 8.5 - 10.5 mg/dL    Protein, Total 6.4 6.0 - 8.0 g/dL    Albumin 2.9 (L) 3.5 - 5.0 g/dL    Alkaline Phosphatase 111 45 - 120 U/L    AST 29 0 - 40 U/L    ALT 18 0 - 45 U/L   HM1 (CBC with Diff)   Result Value Ref Range    WBC 5.0 4.0 - 11.0 thou/uL    RBC 2.80 (L) 4.40 - 6.20 mill/uL    Hemoglobin 10.3 (L) 14.0 - 18.0 g/dL    Hematocrit 30.3 (L) 40.0 - 54.0 %     (H) 80 - 100 fL    MCH 36.7 (H) 27.0 - 34.0 pg    MCHC 33.9 32.0 - 36.0 g/dL    RDW 14.8 (H) 11.0 - 14.5 %    Platelets 195 140 - 440 thou/uL    MPV 7.6 7.0 - 10.0 fL    Neutrophils % 75 (H) 50 - 70 %    Lymphocytes % 18 (L) 20 - 40 %    Monocytes % 6 2 - 10 %    Eosinophils % 0 0 - 6 %    Basophils % 0 0 - 2 %    Neutrophils Absolute 3.7 2.0 - 7.7 thou/uL    Lymphocytes Absolute 0.9 0.8 - 4.4 thou/uL    Monocytes Absolute 0.3 0.0 - 0.9 thou/uL    Eosinophils Absolute 0.0 0.0 - 0.4 thou/uL    Basophils Absolute 0.0 0.0 - 0.2 thou/uL       Imaging    Cta Chest Pe Run    Result Date: 1/24/2017  CTA CHEST PE RUN 1/24/2017 5:10 PM INDICATION: Chest pain, acute, PE suspected history of lung cancer and resection. TECHNIQUE: Helical acquisition through the chest was performed during the arterial phase of contrast  enhancement using IV contrast. 2D and 3D reconstructions were performed by the CT technologist. Dose reduction techniques were used. IV CONTRAST: Iohexol (Omni) 100 mL COMPARISON: 12/9/2016 FINDINGS: ANGIOGRAM CHEST: Negative for pulmonary emboli. No aortic aneurysm or dissection.. LUNGS AND PLEURA: Postoperative changes following partial left pneumonectomy. There is a very small left pleural effusion, unchanged. A few very tiny groundglass opacities as well as a tiny pleural-based nodule present and unchanged. MEDIASTINUM: Coronary artery calcifications. No adenopathy. LIMITED UPPER ABDOMEN: Cholelithiasis unchanged dense atherosclerotic calcifications. MUSCULOSKELETAL: Postoperative changes of left hemithorax.     CONCLUSION: 1.  No etiology for new symptoms evident. Stable postoperative appearances. 2.  No pulmonary emboli identified.    Us Venous Legs Bilateral    Result Date: 1/24/2017  US VENOUS LEGS BILATERAL 1/24/2017 4:38 PM INDICATION: Bilateral leg swelling.  R/o DVT TECHNIQUE: Routine exam with compression, augmentation, and duplex utilizing 2D gray-scale imaging, Doppler interrogation with color-flow and spectral waveform analysis. COMPARISON: None. FINDINGS: The common femoral, femoral, popliteal, and segmentally visualized calf veins were evaluated. Right leg veins are negative for deep venous thrombosis. Left leg veins are negative for deep venous thrombosis. No popliteal cysts.     CONCLUSION: 1.  Bilateral leg veins are negative for DVT.    Ct Chest Abdomen Pelvis Without Oral With Iv Contrast    Result Date: 2/2/2017  CT CHEST, ABDOMEN, AND PELVIS 2/2/2017 9:52 AM      INDICATION: fu lung cancer TECHNIQUE: CT chest, abdomen, and pelvis. Dose reduction techniques were used. IV CONTRAST: Iohexol (Omni) 100 mL COMPARISON: 12/09/2016, and 08/26/2016 CTs. FINDINGS: CHEST: Left chest volume loss from lower lobectomy. Stable mild left posterior pleural scar. Stable right upper lobe 6 mm and 6 mm  groundglass opacities (series 3, images 18 and 36). Stable right middle lobe 3 mm nodule (image 80). No adenopathy.  ABDOMEN: Stable right adrenal 10 mm nodule should be benign. Stable small splenic hypodense lesion. Cholelithiasis. Aortoiliac atherosclerosis. Normal-appearing liver, left adrenal, kidneys, and pancreas. PELVIS: Streak artifact. Sigmoid diverticuli. No adenopathy. MUSCULOSKELETAL: Bilateral total hip prostheses. Diffuse lumbar disc degeneration.  Left L1 and L2 transverse spinous processes subacute fractures with callus formation. No metastases seen.     CONCLUSION: 1.  Left lobectomy for cancer. No evidence malignant recurrence. 2.  Stable right lung groundglass opacities and nodule. 3.  Stable right adrenal 10 mm nodule. 4.  Cholelithiasis. 5.  Subacute left L1 and L2 transverse spinous process fractures.        Signed by: Jag Li MD

## 2021-06-08 NOTE — PROGRESS NOTES
Assessment/Plan:    Christian Rees is a 66 y.o. male presenting for:    1. Lower extremity edema  Recent laboratory tests looked good except for low magnesium.  Magnesium was replaced vi IV this week.  His albumin was slightly low and we discussed increasing lean proteins in his diet.  Otherwise, BNP will be checked today to rule out heart failure but I think that is doubtful given that his lungs sound normal and he is able to lay flat with no difficulty.  I was unfortunately unable to do an x-ray today as a radiologic technician had to go home sick.  I suspect that his swelling may be due to the amlodipine which was recently initiated.  This was initiated as he had to be off of his Lasix for a while due to low magnesium.  He is back on his Lasix and blood pressure actually looks okay today so we will trial him off of his low-dose amlodipine to see if it makes a difference for him.  He will keep me updated.  - Magnesium  - BNP(B-type Natriuretic Peptide)        Medications Discontinued During This Encounter   Medication Reason     amLODIPine (NORVASC) 2.5 MG tablet Therapy completed     meloxicam (MOBIC) 7.5 MG tablet Therapy completed     metoprolol (LOPRESSOR) 25 MG tablet Therapy completed           Chief Complaint:  Chief Complaint   Patient presents with     Leg Swelling     Bilateral     Medication Refill     Refill needed        Subjective:   Christian Rees  Is a very pleasant 66-year-old gentleman with a past medical history significant for  Metastatic adenocarcinoma of his left lung  As well as hypertension and a history of heart failure who presents the clinic today  Due to lower extremity swelling. Briefly, the patient's was being seen by the [facility] and was noted to have low magnesium levels. He was taken off of his Lasix and his blood pressure became fairly elevated. He followed up in clinic and I started him on amlodipine. In the meantime he was started back on his Lasix by his  oncologist because he'd been having some lower extremity swelling. That overall resolved however now about a week ago he began to have more swelling bilaterally in his lower extremities. Some pain due to the swelling and some tightness. He states he does not feel short of breath. He is not been coughing. He is able to lay flat at night.     He states that he is not then the changing his diets. He does keep his legs elevated but states that even after elevation or when he wakes up in the morning they continue to be somewhat swollen.   He attempted to double his Lasix but it was not helpful.     He is otherwise doing well. He had IV magnesium replacement this week due to a low magnesium level.     Interestingly, his weight is down a few pounds from when I saw him two months ago    12 point review of systems completed and negative except for what has been described above    History   Smoking Status     Former Smoker     Packs/day: 1.00     Years: 40.00     Quit date: 1/1/2013   Smokeless Tobacco     Never Used     Comment: Takes a couple drags now and again.       Current Outpatient Prescriptions   Medication Sig     atorvastatin (LIPITOR) 40 MG tablet TAKE ONE TABLET BY MOUTH EVERY DAY     dexamethasone (DECADRON) 4 MG tablet TAKE ONE TABLET BY MOUTH TWICE A DAY FOR 3 DAYS STARTING THE DAY BEFORE PEMETREXED THERAPY     folic acid (FOLVITE) 1 MG tablet TAKE ONE TABLET BY MOUTH EVERY DAY     hydrOXYzine (VISTARIL) 50 MG capsule Take 1 capsule (50 mg total) by mouth bedtime as needed.     indomethacin (INDOCIN) 50 MG capsule TAKE ONE CAPSULE BY MOUTH THREE TIMES A DAY WITH FOOD AS NEEDED     IPRATROPIUM/ALBUTEROL SULFATE (IPRATROPIUM-ALBUTEROL INHL) Inhale 2 puffs 3 (three) times a day as needed.      lisinopril (PRINIVIL,ZESTRIL) 20 MG tablet TAKE ONE TABLET BY MOUTH TWICE A DAY     LORazepam (ATIVAN) 0.5 MG tablet Take 1-2 tablets (0.5-1 mg total) by mouth 2 (two) times a day as needed for anxiety.     magnesium oxide  "(MAGOX) 400 mg tablet 400mg 3 times daily     multivitamin (MULTIVITAMIN) per tablet Take 1 tablet by mouth daily.     omeprazole (PRILOSEC) 20 MG capsule Take 20 mg by mouth bedtime.     oxyCODONE-acetaminophen (PERCOCET) 5-325 mg per tablet Take 1-2 tablets by mouth every 4 (four) hours as needed (every 4-6 hours as needed).     PARoxetine (PAXIL) 40 MG tablet TAKE ONE TABLET BY MOUTH EVERY DAY     potassium chloride SA (K-DUR,KLOR-CON) 20 MEQ tablet Take 1 tablet (20 mEq total) by mouth daily.     prochlorperazine (COMPAZINE) 10 MG tablet Take 1 tablet (10 mg total) by mouth every 6 (six) hours as needed (For breakthrough nausea/vomiting).         Objective:  Vitals:    01/11/17 1302   BP: 138/78   Pulse: 64   Resp: 16   Temp: 97.7  F (36.5  C)   TempSrc: Oral   Weight: 208 lb 9 oz (94.6 kg)   Height: 5' 7\" (1.702 m)       Vital signs reviewed and stable  General: No acute distress  Psych: Appropriate affect  HEENT: moist mucous membranes, pupils equal, round, reactive to light and accomodation, posterior oropharynx clear of erythema or exudate, tympanic membranes are pearly grey bilaterally  Lymph: no cervical or supraclavicular lymphadenopathy  Cardiovascular: regular rate and rhythm with no murmur  Pulmonary: clear to auscultation bilaterally with no wheeze  Abdomen: soft, non tender, non distended with normo-active bowel sounds  Extremities: warm and well perfused with 1-2+ pitting edema bilaterally  With some mild redness on the interior shins,  Edema is up to his midcalf  Skin: warm and dry with no rash         This note has been dictated and transcribed using voice recognition software.   Any errors in transcription are unintentional and inherent to the software.  "

## 2021-06-08 NOTE — PROGRESS NOTES
Referred By: Cristela De Jesus MD    DOS: 02/15/2017    Chief Complaint: Bilateral leg swelling    History of Present Illness:    Christian Rees presents to the [facility], as a new consult, with bilateral leg swelling.  The swelling began about a month ago.  His problems began in Feb of 2016 when he was diagnosed with stage IV metastatic adenocarcinoma of the lung with mets to the lymph nodes and bone.  He had previously had a left lower lobectomy in April of 2013.  With the metastatic cancer he has had extensive chemotherapy.  Most recent CT was clear for any disease.   It is also noted he has had problems with persistent a-fib and had a left leg DVT years ago.  His swelling started in his left leg.  He went to emergency room and venous US was negative.  He saw his PCP who was concerned about protein stores.  He increased his protein, bandaged his legs and elevated his legs and now the swelling has begun to decrease. The water pills did not seem to help. Pain is rated a 0 on a 10 point scale. When the swelling was up the legs were red and painful. The swelling is improved with elevation.  The patient sleeps in a bed.  History is complicated by known Congestive Heart Failure and DVT.  There has been no new numbness, tingling or weakness.  There have been no new masses, rashes, or swellings of any other joints. There has been no new decrease in appetite, unexplained weight loss, abdominal bloating and bowel or bladder changes.    Past Medical History:   Diagnosis Date     Anxiety      Closed Posterior Dislocation Of The Hip     Created by Conversion      Depression      DVT (deep venous thrombosis)      Gout      Heart failure      Hypertension      Lung cancer     Left lung     Persistent Atrial Fibrillation     Created by Conversion [facility] Annotation: Apr 17 2014 11:48AM - Ismael Holbrooket: 3/14 new afib  with contr VR with hip replacement [id number] score of  3 with 1  point  each for age 65 and older, HTN and CHF hx---new warfarin start        Past Surgical History:   Procedure Laterality Date     LUNG SURGERY       SC CARDIOVERSION ELECTIVE ARRHYTHMIA EXTERNAL      Description: Elective Cardioversion External;  Recorded: 05/16/2014;  Comments: 4/25/14     SC REMOVAL OF LUNG,LOBECTOMY      Description: Lung Lobectomy;  Proc Date: 01/01/2013;  Comments: left lower     SC TOTAL HIP ARTHROPLASTY Bilateral Left (2011), right (2012)     Description: Total Hip Replacement;  Recorded: 01/11/2013;  Comments: right 2003; ; left 2007 x2     SHOULDER SURGERY Left 2009?    rotator cuff repair         Current Outpatient Prescriptions:      atorvastatin (LIPITOR) 40 MG tablet, TAKE ONE TABLET BY MOUTH EVERY DAY, Disp: 90 tablet, Rfl: 3     carvedilol (COREG) 6.25 MG tablet, Take 1 tablet (6.25 mg total) by mouth 2 (two) times a day with meals., Disp: 60 tablet, Rfl: 11     dexamethasone (DECADRON) 4 MG tablet, TAKE ONE TABLET BY MOUTH TWICE A DAY FOR 3 DAYS STARTING THE DAY BEFORE PEMETREXED THERAPY, Disp: 72 tablet, Rfl: 0     folic acid (FOLVITE) 1 MG tablet, TAKE ONE TABLET BY MOUTH EVERY DAY, Disp: 100 tablet, Rfl: 3     furosemide (LASIX) 40 MG tablet, Take 40 mg by mouth daily., Disp: , Rfl:      hydrOXYzine (VISTARIL) 50 MG capsule, Take 1 capsule (50 mg total) by mouth bedtime as needed., Disp: 60 capsule, Rfl: 0     indomethacin (INDOCIN) 50 MG capsule, TAKE ONE CAPSULE BY MOUTH THREE TIMES A DAY WITH FOOD AS NEEDED, Disp: 30 capsule, Rfl: 12     IPRATROPIUM/ALBUTEROL SULFATE (IPRATROPIUM-ALBUTEROL INHL), Inhale 2 puffs 3 (three) times a day as needed. , Disp: , Rfl:      lisinopril (PRINIVIL,ZESTRIL) 20 MG tablet, TAKE ONE TABLET BY MOUTH TWICE A DAY, Disp: 180 tablet, Rfl: 1     LORazepam (ATIVAN) 0.5 MG tablet, Take 1-2 tablets (0.5-1 mg total) by mouth 2 (two) times a day as needed for anxiety., Disp: 60 tablet, Rfl: 1     magnesium oxide (MAGOX) 400 mg tablet, 400mg 3 times daily, Disp:  90 tablet, Rfl: 3     multivitamin (MULTIVITAMIN) per tablet, Take 1 tablet by mouth daily., Disp: , Rfl:      omeprazole (PRILOSEC) 20 MG capsule, Take 20 mg by mouth bedtime., Disp: , Rfl:      oxyCODONE-acetaminophen (PERCOCET) 5-325 mg per tablet, TAKE ONE TO TWO TABLETS BY MOUTH EVERY 4 TO 6 HOURS AS NEEDED, Disp: 60 tablet, Rfl: 0     PARoxetine (PAXIL) 40 MG tablet, TAKE ONE TABLET BY MOUTH EVERY DAY, Disp: 90 tablet, Rfl: 1     potassium chloride SA (K-DUR,KLOR-CON) 20 MEQ tablet, Take 1 tablet (20 mEq total) by mouth daily., Disp: 30 tablet, Rfl: 6     prochlorperazine (COMPAZINE) 10 MG tablet, Take 1 tablet (10 mg total) by mouth every 6 (six) hours as needed (For breakthrough nausea/vomiting)., Disp: 30 tablet, Rfl: 1    Current Facility-Administered Medications:      cyanocobalamin injection 1,000 mcg, 1,000 mcg, Intramuscular, Q30 Days, Jag Li MD, 1,000 mcg at 02/08/17 1255    Allergies   Allergen Reactions     Oxycontin [Oxycodone] Itching       Social History     Social History     Marital status:      Spouse name: N/A     Number of children: N/A     Years of education: N/A     Occupational History     Not on file.     Social History Main Topics     Smoking status: Former Smoker     Packs/day: 1.00     Years: 40.00     Quit date: 1/1/2013     Smokeless tobacco: Never Used     Alcohol use 0.0 - 4.8 oz/week     0 - 4 Cans of beer, 0 - 4 Shots of liquor per week     Drug use: No     Sexual activity: No     Other Topics Concern     Not on file     Social History Narrative    .  1 child.  Retired from [employer] work. Lives in own home.       Family History   Problem Relation Age of Onset     Cancer Mother      breast and lung     Hypertension Father      No Medical Problems Son      Diabetes Neg Hx      Heart disease Neg Hx      Colon cancer Neg Hx      Prostate cancer Neg Hx        Review of Systems:  Review of systems is otherwise negative, except as noted above.   Full 12 point review of systems was completed.    Radiology/Diagnostic Studies:   Cta Chest Pe Run    Result Date: 1/24/2017  CTA CHEST PE RUN 1/24/2017 5:10 PM INDICATION: Chest pain, acute, PE suspected history of lung cancer and resection. TECHNIQUE: Helical acquisition through the chest was performed during the arterial phase of contrast enhancement using IV contrast. 2D and 3D reconstructions were performed by the CT technologist. Dose reduction techniques were used. IV CONTRAST: Iohexol (Omni) 100 mL COMPARISON: 12/9/2016 FINDINGS: ANGIOGRAM CHEST: Negative for pulmonary emboli. No aortic aneurysm or dissection.. LUNGS AND PLEURA: Postoperative changes following partial left pneumonectomy. There is a very small left pleural effusion, unchanged. A few very tiny groundglass opacities as well as a tiny pleural-based nodule present and unchanged. MEDIASTINUM: Coronary artery calcifications. No adenopathy. LIMITED UPPER ABDOMEN: Cholelithiasis unchanged dense atherosclerotic calcifications. MUSCULOSKELETAL: Postoperative changes of left hemithorax.     CONCLUSION: 1.  No etiology for new symptoms evident. Stable postoperative appearances. 2.  No pulmonary emboli identified.    Us Venous Legs Bilateral    Result Date: 1/24/2017  US VENOUS LEGS BILATERAL 1/24/2017 4:38 PM INDICATION: Bilateral leg swelling.  R/o DVT TECHNIQUE: Routine exam with compression, augmentation, and duplex utilizing 2D gray-scale imaging, Doppler interrogation with color-flow and spectral waveform analysis. COMPARISON: None. FINDINGS: The common femoral, femoral, popliteal, and segmentally visualized calf veins were evaluated. Right leg veins are negative for deep venous thrombosis. Left leg veins are negative for deep venous thrombosis. No popliteal cysts.     CONCLUSION: 1.  Bilateral leg veins are negative for DVT.    Ct Chest Abdomen Pelvis Without Oral With Iv Contrast    Result Date: 2/2/2017  CT CHEST, ABDOMEN, AND PELVIS 2/2/2017  9:52 AM      INDICATION: fu lung cancer TECHNIQUE: CT chest, abdomen, and pelvis. Dose reduction techniques were used. IV CONTRAST: Iohexol (Omni) 100 mL COMPARISON: 12/09/2016, and 08/26/2016 CTs. FINDINGS: CHEST: Left chest volume loss from lower lobectomy. Stable mild left posterior pleural scar. Stable right upper lobe 6 mm and 6 mm groundglass opacities (series 3, images 18 and 36). Stable right middle lobe 3 mm nodule (image 80). No adenopathy.  ABDOMEN: Stable right adrenal 10 mm nodule should be benign. Stable small splenic hypodense lesion. Cholelithiasis. Aortoiliac atherosclerosis. Normal-appearing liver, left adrenal, kidneys, and pancreas. PELVIS: Streak artifact. Sigmoid diverticuli. No adenopathy. MUSCULOSKELETAL: Bilateral total hip prostheses. Diffuse lumbar disc degeneration.  Left L1 and L2 transverse spinous processes subacute fractures with callus formation. No metastases seen.     CONCLUSION: 1.  Left lobectomy for cancer. No evidence malignant recurrence. 2.  Stable right lung groundglass opacities and nodule. 3.  Stable right adrenal 10 mm nodule. 4.  Cholelithiasis. 5.  Subacute left L1 and L2 transverse spinous process fractures.    Laboratory Studies:      Lab Results   Component Value Date    CREATININE 1.06 02/06/2017      No results found for: HGBA1C        Lab Results   Component Value Date    BUN 12 02/06/2017              Lab Results   Component Value Date    ALBUMIN 2.9 (L) 02/06/2017         Ref Range & Units5/28/14 12:25 PM  2/5/13 11:20 AM  TSH0.3 - 5.0 uIU/mL  1.5  1.9      Physical Exam:  Vitals:    02/15/17 0700   BP: 118/76   Pulse: 72   Resp: 16   Temp: 98.2  F (36.8  C)    There is no height or weight on file to calculate BMI.    See flow chart for circumferential measures.    Vasc Edema 2/15/2017   Right just above MTP 22.7   Right Ankle 23   Right Widest Calf 39.5   Right Thigh Up 10cm 47.1   Left - just above MTP 23.4   Left Ankle 24.3   Left Widest Calf 41   Left Thigh  Up 10cm 42.1       General:  66 y.o. male in no apparent distress.  Alert and oriented x 3.  Cooperative. Affect normal.    Respiratory:  Lungs have decreased breath sounds throughout    Cardiovascular: Normal S1, S2 without murmur, gallop or rub.  No audible carotid bruits. Regular rhythm.     Abdominal: Normal bowel sounds without any pain, guarding or rigidity. No inguinal lymphadenopathy palpated.    Musculoskeletal:  Normal range of motion in hips, knees and ankles bilaterally throughout .  There is no active joint synovitis, erythema, swelling or joint laxity.     Neurological:  Sensation is intact to pin prick and light touch in both legs.  Strength testing is normal in hip flexion, knee flexion, knee extension, ankle dorsiflexion and great toe extension bilaterally. Deep tendon reflexes, knee jerks and ankle jerks, are normal bilaterally.      Vascular: Dorsalis pedis and posterior tibialis pulses are strong and equal bilaterally and reveal audible biphasic waves with a hand held doppler bilaterally. There are no significant telangietasias, medial ankle venous flares, venous varicosities  and spider veins . There is normal capillary refill.     Integumentary: Skin of the legs is uniformly warm and dry, with hyperkeratosis and keratoderma and with positive Stemmer's Sign .  There is mild fibrosis of the feet and toes.  Nails are normal.     Impression:  1.  Bilateral dependent leg swelling  2.  Venous hypertension of the legs bilaterally  3.  Post thrombotic syndrome (mild) left leg  4.  Acquired lymphedema  5.  Metastatic adenocarcinoma lung (4/13 then mets 2/16)    Plan:  1.  Type of swelling was reviewed in detail with the patient.  Questions were answered and education was completed.  2.  Check vit D.  With history of steroid use will also check Vit A.  Continue to work on increased protein as high metabolic load now.   3.  To therapy then compression.  Legs were measured.  The [employer] catalogue was  provided with recommendations for compression stockings.(128/162/111/120/125/150 M)  4.  Patient will follow up in 3 months or when needed.     Thank you very much for allowing me to see Christian APARNA Ramirezen today.  If you have any questions, please feel free to contact me.    Time spent with patient 60 minutes with greater than 50% time in consultation, education and coordination of care.     Leonie Oneill MD, ABWMS, FACCWS, [facility]  Medical Director Wound Care and Lymphedema  [facility]  [phone]

## 2021-06-08 NOTE — PROGRESS NOTES
"Pt arrived ambulatory at 09:05 from 2nd floor [facility]. Reviewed today's lab results, and the Mg is 1.0. Placed IV and infused Mg 4 gms over 2 hours, along with a concurrent IV of NS at 100 mls/hr. Patient states, \"a few times ago I got sick with just the magnesium and need extra fluid with it.\" Tolerated today's infusion well, with no ill effects. Ate 100% of lunch. Denies pain. Rested in recliner number 11 with legs elevated, and was up to the bathroom independently. Printed AVS explained and given to the patient, and plans to return on Feb. 2nd for a CT scan - verbalized understanding of calling with any questions or concerns before the appointment on Feb. 6th. Left unit in stable condition at 12:35.    Nirali Edmondson RN  "

## 2021-06-09 NOTE — PROGRESS NOTES
[facility]   Lymphedema Initial Evaluation      Patient Name: Christian Rees  Date of evaluation: 2/20/2017  Referral Diagnosis: Edema; Venous and Lymphatic; Right leg and Left leg  Referring provider: Leonie Oneill, *   Precautions/ Restrictions: No US    Visit Diagnosis:     ICD-10-CM    1. Acquired lymphedema of leg I89.0    2. Bilateral leg pain M79.604     M79.605    3. Venous hypertension of both lower extremities I87.303        Assessment:    Bilateral lymphedema left> right by 7%.  Pt worried about the cost of PT. Will get him on a Home program as soon as possible.  LLIS at 76%.  PT is unable to reach his feet to don socks and shoes due to his many hip surgeries.  Impairments in  pain, integumentary integrity, swelling  Patient's signs and symptoms are consistent with lymphedema.  Prognosis to achieve goals is  good   Pt. is appropriate for skilled PT intervention as outlined in the Plan of Care (POC).  Pt. would be a good candidate for edema management and to develop a home management program.    Goals:     Patient will have a decreased volume in : right;left;by 5%;by 10%;to decrease risk of infection;for better fit of clothing;for improved body image;for ease of movement;in 4 weeks  Patient will have decreased fibrosis, scar tissue for improved lymphatic mobilitiy : in 4 weeks  Patient will perform, verbalize self-management of: skin care;self-monitoring;exercise;massage;self-compression;compression wear;compression care;infection prevention;in 4 weeks    Patient's expectations/goals are realistic.    Barriers to Learning or Achieving Goals:  No Barriers.    Lymphedema Category:  IV - Stage 1 with Mod-High Functional Demand       Plan / Patient Instructions:      Plan of Care:   Communication with: Referral Source  Patient Related Instruction: Nature of Condition;Treatment plan and rationale;Self Care instruction;Basis of treatment;Body mechanics;Posture;Precautions;Next  steps;Expected outcome  Times per Week: 2  Number of Visits: 12  Discharge Planning: to home program  Precautions / Restrictions : No US  Therapeutic Exercise: ROM;Stretching;Strengthening;Lymphedema  Neuromuscular Reeducation: balance/proprioception;TNE  Manual Therapy: lymphatic drainage massage  Functional Training (ADL's): skin care;self care;lymphedema precautions;ADL's  Equipment: compression bandages    Plan for next visits:   Start MLT  Check stockings for fit  Instruct in donning (try different donners if his doesn't work)  Check exercises     Subjective:         Social information:   Living Situation:single family home, stairs  with railing and has assistance No  Wife has MS   Occupation:retired   Work Status:NA   Equipment Available: Compression stockings from [employer]    History of Present Illness:    Christian is a 66 y.o. male who presents to therapy today with complaints of Bilateral LE swelling and pain. Date of onset/duration of symptoms is . Onset was sudden. Symptoms are constant and getting better. He reports  an episodic  history of similar symptoms. He describes their previous level of function as limited with Hip surgeries and intermittient swelling.  The left is usually worse than the right.    Pain Ratin  Pain rating at best: 3  Pain rating at worst: 9  Pain description: aching and Pressure    Functional limitations are described as occurring with:   personal cares donning shoes and socks  transitional movements sit to stand  walking >10 minutes    Patient reports benefit from:  elevation       Objective:      Patient Outcome Measures :    Lymphedema Life Impact Score (%): 76   Scores range from %, where a score of 20% represents the least impact on the individual's life (minimal physical, psychosocial and functional concerns).       Date 2017   Side right left   Toe 7.9 8   10 cm from tip of second toe 25.3 25.4   20 cm from tip of second toe 23.5 24.9    30 cm from tip of second toe 30 33.4   40 cm from tip of second toe 40 41.8    50 cm from tip of second toe 38.6 38.2   60 cm from tip of second toe 44.4 44.2        Total estimated volume [id number]        Swelling Left>Right [id number]         Involved side: Bilateral  Posture Observation:      General sitting posture is  poor.  General standing posture is poor.      Involved area: Bilateral Leg  Edema: Pitting at grade and 2+  Induration: Yes  Fibrosis: mild  Temperature: Normal  Sensation: Normal  Skin color: Reddened  Skin texture: Distended pores, Dry and Shiny  Scars: Hip surgery x6-7  Wounds: Absent  Muscle tone: Normal; weak  Gait: shuffles  Tests:  LE limited in ROM in hips; strength 4/5 in LE's        Treatment Today      TREATMENT MINUTES COMMENTS   Evaluation 30 Low   Self-care/ Home management 15 Discussed nature of condition, basis of treatment, POC, precautions.  Pt given juxtalite sample for left leg with compressive anklet.  Pt given sample knee high to try on right.  Instructed in the use of the  juxtalite.   Manual therapy     Neuromuscular Re-education     Therapeutic Activity     Therapeutic Exercises 10 Instructed in exercises:  Exercises:  Exercise #1: Modified (seated lymph excercises with deep breathing  Exercise #2: Just ankle pumps  Exercise #3: Standing balance (heel toe, minisquats, marching, extension, abduction, calf stretch)     Gait training     Modality__________________                Total 55    Blank areas are intentional and mean the treatment did not include these items.     PT Evaluation Code: (Please list factors)  Patient History/Comorbidities: Lung CA stage 4; CHF; post thrombotic syndrome  Examination: 3 elements  Clinical Presentation: Stable  Clinical Decision Making: Low    Patient History/  Comorbidities Examination  (body structures and functions, activity limitations, and/or participation restrictions) Clinical Presentation Clinical Decision Making  (Complexity)   No documented Comorbidities or personal factors 1-2 Elements Stable and/or uncomplicated Low   1-2 documented comorbidities or personal factor 3 Elements Evolving clinical presentation with changing characteristics Moderate   3-4 documented comorbidities or personal factors 4 or more Unstable and unpredictable High            Ines Gonzalez  2/20/2017  8:02 AM

## 2021-06-09 NOTE — PROGRESS NOTES
[facility] Hematology and Oncology Progress Note    Patient: Christian Rees  MRN: [id number]  Date of Service:         Reason for Visit    Chief Complaint   Patient presents with     HE Cancer     Lung Cancer       Assessment and Plan    Metastatic adenocarcinoma of the lung with lymph node and bone metastases diagnosed by biopsy in February 2016  Dyspnea on exertion  Hypertension  Anxiety/depression  Low magnesium  Anemia    CT scans personally reviewed and show no recurrence of lung cancer.  Patient is reassured.  We will continue with observation.  I will see him again in 3 months with repeat scans.    Blood pressure is elevated but there is element of whitecoat hypertension.  He will continue same medications.    Hemoglobin is stable and anemia probably due to chemotherapy.  He will continue magnesium and potassium supplementation.      Plan: Follow-up in 3 months with repeat CT scans and lab work    Measurable disease: CT of the chest          Current therapy: Observation        Treatment history:   Alimta maintenance, 8 cycles completed in December 2016    Patient has completed 5 cycles of carboplatin and Alimta with dose reduction for recurrent and metastatic lung cancer;  last cycle in June 2016  Left lower lobectomy in April 2013   He then received 4 cycles of chemotherapy as part of the ECOG 1505 clinical trial   He also received maintenance Avastin through December 2013 on the same clinical trial, this was stopped because of hip surgery   Lung cancer    Staging form: Lung, AJCC 7th Edition      Clinical: Stage IV (T1b, N3, M1b) - Signed by Erica Borrero CNP on 3/14/2016    ECOG Performance        Distress Assessment  Distress Assessment Score: No distress    Pain  Pain Score (Initial OR Reassessment): 3        Problem List    1. Malignant neoplasm of lower lobe of left lung  potassium chloride SA (K-DUR,KLOR-CON) 20 MEQ tablet    HM1(CBC and Differential)    Comprehensive Metabolic Panel     Magnesium    CT Chest Abdomen Pelvis Without Oral With IV Contrast   2. Malignant neoplasm of hilus of left lung          CC: Cristela De Jesus MD    ______________________________________________________________________________    History of Present Illness    Mr. Christian Rees returns for evaluation.  He was seen in February.  Overall doing fairly well.  No headaches dizziness or focal weakness.  Breathing is stable.  Denies any cough or hemoptysis.  No new abdominal or bone pain.  No change of bowels or urine.  No bleeding or rash.  ECOG status is 1.  Leg edema is better.    Pain Status  Currently in Pain: Yes    Review of Systems    Constitutional  Constitutional (WDL): Exceptions to WDL  Fatigue: Fatigue relieved by rest  Neurosensory  Neurosensory (WDL): Exceptions to WDL  Cardiovascular  Cardiovascular (WDL): Exceptions to WDL  Edema: Yes  Edema Limbs: 5 - 10% inter-limb discrepancy in volume or circumference at point of greatest visible difference, swelling or obscuration of anatomic architecture on close inspection (LE left > right)  Pulmonary  Respiratory (WDL): Exceptions to WDL  Dyspnea: Shortness of breath with minimal exertion, limiting instrumental ADL  Gastrointestinal  Gastrointestinal (WDL): All gastrointestinal elements are within defined limits  Genitourinary  Genitourinary (WDL): All genitourinary elements are within defined limits  Integumentary  Integumentary (WDL): All integumentary elements are within defined limits  Patient Coping  Patient Coping: Accepting  Distress Assessment  Distress Assessment Score: No distress  Accompanied by  Accompanied by: Alone    Past History  Past Medical History:   Diagnosis Date     Anxiety      Closed Posterior Dislocation Of The Hip     Created by Conversion      Depression      DVT (deep venous thrombosis)      Gout      Heart failure      Hypertension      Lung cancer     Left lung     Persistent Atrial Fibrillation     Created by Conversion  "[facility] Annotation: Apr 17 2014 11:48AM - Deedee Holbrook: 3/14 new afib  with contr VR with hip replacement [id number] score of  3 with 1  point each for age 65 and older, HTN and CHF hx---new warfarin start          Past Surgical History:   Procedure Laterality Date     LUNG SURGERY       ME CARDIOVERSION ELECTIVE ARRHYTHMIA EXTERNAL      Description: Elective Cardioversion External;  Recorded: 05/16/2014;  Comments: 4/25/14     ME REMOVAL OF LUNG,LOBECTOMY      Description: Lung Lobectomy;  Proc Date: 01/01/2013;  Comments: left lower     ME TOTAL HIP ARTHROPLASTY Bilateral Left (2011), right (2012)     Description: Total Hip Replacement;  Recorded: 01/11/2013;  Comments: right 2003; ; left 2007 x2     SHOULDER SURGERY Left 2009?    rotator cuff repair       Physical Exam    Recent Vitals 4/6/2017   Height 5' 6\"   Weight 201 lbs 13 oz   BSA (m2) 2.06 m2   /93   Pulse 95   Temp 98   Temp src 1   SpO2 96       GENERAL: Alert and oriented. Seated comfortably. In no distress.    HEAD: Atraumatic and normocephalic.  Has a full head of hair.    EYES: JULIET, EOMI.  No pallor.  No icterus.    Oral cavity: no mucosal lesion or tonsillar enlargement.    NECK: supple. JVP normal.  No thyroid enlargement.    LYMPH NODES: No palpable, cervical, axillary or inguinal lymphadenopathy.    CHEST: clear to auscultation bilaterally.  Resonant to percussion throughout bilaterally.  Symmetrical breath movements bilaterally.    CVS: S1 and S2 are heard. Regular rate and rhythm.  No murmur or gallop or rub heard.    ABDOMEN: Soft. Not tender. Not distended.  No palpable hepatomegaly or splenomegaly.  No other mass palpable.  Bowel sounds heard.    EXTREMITIES: Warm.  No edema.    SKIN: no rash, or bruising or purpura.  CNS: Nonfocal        Lab Results    Recent Results (from the past 168 hour(s))   POCT creatinine   Result Value Ref Range    POC Creatinine 1.1 mg/dL   POCT GFR   Result Value Ref Range    POC GFR AMER " AF HE >60  >60 mL/min/1.73m2    POC GFR NON AMER AF >60  >60 mL/min/1.73m2   HM1 (CBC with Diff)   Result Value Ref Range    WBC 4.2 4.0 - 11.0 thou/uL    RBC 3.05 (L) 4.40 - 6.20 mill/uL    Hemoglobin 10.8 (L) 14.0 - 18.0 g/dL    Hematocrit 32.0 (L) 40.0 - 54.0 %     (H) 80 - 100 fL    MCH 35.4 (H) 27.0 - 34.0 pg    MCHC 33.8 32.0 - 36.0 g/dL    RDW 13.6 11.0 - 14.5 %    Platelets 133 (L) 140 - 440 thou/uL    MPV 9.9 8.5 - 12.5 fL    Neutrophils % 56 50 - 70 %    Lymphocytes % 32 20 - 40 %    Monocytes % 9 2 - 10 %    Eosinophils % 2 0 - 6 %    Basophils % 1 0 - 2 %    Neutrophils Absolute 2.4 2.0 - 7.7 thou/uL    Lymphocytes Absolute 1.4 0.8 - 4.4 thou/uL    Monocytes Absolute 0.4 0.0 - 0.9 thou/uL    Eosinophils Absolute 0.1 0.0 - 0.4 thou/uL    Basophils Absolute 0.0 0.0 - 0.2 thou/uL       Imaging    Ct Chest Abdomen Pelvis Without Oral With Iv Contrast    Result Date: 4/3/2017  CT CHEST, ABDOMEN, AND PELVIS 4/3/2017 10:54 AM      INDICATION: Left lower lobe adenocarcinoma initially diagnosed April 20, 2013. Metastasis diagnosed February 20, 2016. Surveillance. TECHNIQUE: CT chest, abdomen, and pelvis. Dose reduction techniques were used. IV CONTRAST: Iohexol (Omni) 100 mL COMPARISON: 2/2/2017 FINDINGS: CHEST: Postthoracotomy changes are noted on the left with volume loss and very small loculated left basilar pleural effusion which is unchanged.  The groundglass nodules in the right upper lobe, 2 with central linear components (images 20, 33-37 and 42) are stable. Tiny right middle lobe nodule (image 80 stable. No new nodules. Extensive coronary artery calcifications. No large central pulmonary emboli. ABDOMEN: There is diffuse fatty infiltration of the liver. Multiple tiny layering gallstones are unchanged. Pancreas is normal. Less than 1 cm right adrenal nodule is stable. There are a few tiny splenic cysts. Kidneys are unremarkable. Aorta is heavily calcified as are all of the branches including  origins. No aneurysm or adenopathy. Small bowel loops are normal. PELVIS: There is beam hardening artifact from the bilateral total hip arthroplasties. No adenopathy or free fluid. Colon is redundant with moderate diverticulosis. No ascites or peritoneal tumor deposits. MUSCULOSKELETAL: Subacute left L1 and L2 transverse fractures are again noted. Multiple left-sided healed rib fractures again seen. There is degenerative rotoscoliosis of the lumbar spine convex to the right centered at L1-2 with moderate spondylitic changes. No focally suspicious lesions noted.     CONCLUSION: 1.  No evidence of  recurrent tumor with stable appearance to the chest, abdomen and pelvis. 2.  Specifically, postthoracotomy changes are noted on the left with a very small, loculated left-sided pleural effusion and a few heterogeneous but predominantly groundglass right-sided lung nodules as noted above. 3.  Patient has extensive coronary artery calcifications, fatty infiltration of the liver, cholelithiasis, presumed tiny splenic cysts, presumed nonfunctioning less than 1 cm right adrenal adenoma and moderate colonic diverticulosis.        Signed by: Jag Li MD

## 2021-06-09 NOTE — PROGRESS NOTES
Patient seen in clinic for HF education s/p recent ER visit 1/24/17.  Reviewed HF Binder that includes the  HF Sx Awareness & Action plan  handout and  A [employer] Pump  booklet and Weight log booklet highlighting :  __X_patient s type of heart failure _X__Na management in diet  __X_importance of daily wts  _X__Fluid Guidelines, if applicable  __X_medication review and importance of compliance     Instructed patient in signs and sx of heart failure, reiterated when to call clinic - reviewed HF hotline # [phone] and after hours call # [phone].  Majority of time was spent reviewing: Maintaining a low-sodium diet. Reviewed low sodium options for meal planning as well as reading nutrition labels. Patient reports to needing cheese for his protein intake; I reviewed alternatives such as chicken and lean beef.  Patient verbalized understanding of HF discussion.  Plan for f/u with continued HF education reviewed.  Patient has f/u with Sandra ANDINO 3/23/17.

## 2021-06-09 NOTE — PROGRESS NOTES
[facility] Daily Progress     Patient Name: Christian Rees  Date: 2017  Visit #: 2  PTA visit #:  1  Referral Diagnosis:Referral Diagnosis: Edema; Venous and Lymphatic; Right leg and Left leg  Referring provider: Leonie Oneill, *   Visit Diagnosis:     ICD-10-CM    1. Acquired lymphedema of leg I89.0    2. Bilateral leg pain M79.604     M79.605    3. Venous hypertension of both lower extremities I87.303          Assessment:   Pt tolerated today's visit well,no increased SOB noted  All compression garments fit fairly well, socks slightly long but are manageable  Patient is benefitting from skilled physical therapy and is making steady progress toward functional goals.  Patient is appropriate to continue with skilled physical therapy intervention, as indicated by initial plan of care.    Goal Status:  No Data Recorded  Patient will have a decreased volume in : right;left;by 5%;by 10%;to decrease risk of infection;for better fit of clothing;for improved body image;for ease of movement;in 4 weeks  Patient will have decreased fibrosis, scar tissue for improved lymphatic mobilitiy : in 4 weeks  Patient will perform, verbalize self-management of: skin care;self-monitoring;exercise;massage;self-compression;compression wear;compression care;infection prevention;in 4 weeks    Plan / Patient Education:   Pt brought to [facility] to purchase wire sock donning aide after PT  Continue with initial plan of care.  Continue MT,compression modification prn, self care education  Subjective:     Pain Ratin constant , L slightlymore sore than R  Has been wearing L compression velcro garment days, tube on L low leg at night    Objective:   Caregiver present: No    Observation of swelling:  is betterper pt.     Volume measurements taken:  No      Skin condition is:  unchanged per pt Skin reddish, slightly fibrotic, dry, intact    Compression:Tube G on L low leg, wears at night    Medication for infection:       SAO2 95%-98%, HR  bpm      Treatment Today     TREATMENT MINUTES COMMENTS   Evaluation     Self-care/ Home management 40 Practiced several options for increased independence donning compression. Compression socks(  provided by PT,  and pair purchased through [employer])Wire donning aide and minimal assist of wife will allow pt to don compression socks on bilaterally   Manual therapy 20 Manual Lymphatic drainage neck, trunk, B LE's   Neuromuscular Re-education     Therapeutic Activity     Therapeutic Exercises 10 Reviewed and performed Lymphedema exercises 3-5 reps each   Gait training     Modality__________________                Total 70    Blank areas are intentional and mean the treatment did not include these items.       Rekha Weaver,MAGNOLIAT  2/22/2017

## 2021-06-09 NOTE — PROGRESS NOTES
Assessment/Plan:     1. Heart failure with diastolic dysfunction, NYHA class II: Christian Rees appears well compensated.  We discussed monitoring heart failure symptoms, following a low-sodium diet, monitoring daily weights, and heart failure treatment.  Blood pressure 148/92 with a heart rate of 76.  I increased his carvedilol to 12.5 mg twice a day.  I encouraged him to call the clinic if he experiences any dizziness or lightheadedness.  I encouraged him to continue wearing his compression stockings for his edema.  We discussed the importance of daily weights.  He met with a heart failure nurse clinician to discuss heart failure management.      Follow-up in the heart failure clinic in 4 weeks and with Dr. Fontanez on April 28 as scheduled    Subjective:     Christian Rees is seen at [facility] heart failure clinic today for continued follow-up.  He follows up in the heart failure clinic for diastolic dysfunction.  He had a nuclear stress test in November 2016 which showed an ejection fraction of 66% and was negative for inducible myocardial ischemia or infarction.  He has a past medical history significant for hypertension, hyperlipidemia, permanent atrial fibrillation, DVT, and left lung cancer with lobectomy.    During the last clinic visit, Dr. Fontanez started him on carvedilol 6.25 mg twice a day for blood pressure control.  He denies any dizziness or lightheadedness.  He continues to have dyspnea on exertion which has not worsened.  He continues to have lower extremity edema which has improved with wearing compression stockings.  He denies any other acute heart failure symptoms.  He denies fatigue, lightheadedness, shortness of breath, orthopnea, PND, palpitations, chest pain and abdominal fullness/bloating.      He is not monitoring home weights.  He is not following a low sodium diet.  His clinic weight is down 9 pounds in the last few weeks.    Review of Systems:   General: WNL  Eyes:  WNL  Ears/Nose/Throat: Hearing Loss  Lungs: Shortness of Breath  Heart: Shortness of Breath with activity, Leg Swelling  Stomach: WNL  Bladder: WNL  Muscle/Joints: Muscle Weakness  Skin: WNL  Nervous System: Falls  Mental Health: Anxiety     Blood: WNL     Patient Active Problem List   Diagnosis     Generalized Anxiety Disorder     Right Bundle Branch Block With Left Anterior Fascicular Block     Chronic Bronchospasm     Familial (Benign Essential) Tremor     Mixed hyperlipidemia     Essential Hypertension     Dizziness     Pigmented Nevus     Joint Pain, Localized In The Shoulder     Anemia     Edema     Joint Pain, Localized In The Hip     Gout     Excessive Sweating     B12 deficiency     Lung cancer     Low magnesium levels     Coronary artery disease due to calcified coronary lesion     Venous hypertension of both lower extremities     Postthrombotic syndrome     Acquired lymphedema of leg     Vitamin D deficiency     Chronic diastolic heart failure       Past Medical History:   Diagnosis Date     Anxiety      Closed Posterior Dislocation Of The Hip     Created by Conversion      Depression      DVT (deep venous thrombosis)      Gout      Heart failure      Hypertension      Lung cancer     Left lung     Persistent Atrial Fibrillation     Created by Conversion [facility] Annotation: Apr 17 2014 11:48AM - JayashanitaIsmael menendezet: 3/14 new afib  with contr VR with hip replacement [id number] score of  3 with 1  point each for age 65 and older, HTN and CHF hx---new warfarin start        Past Surgical History:   Procedure Laterality Date     LUNG SURGERY       [place] CARDIOVERSION ELECTIVE ARRHYTHMIA EXTERNAL      Description: Elective Cardioversion External;  Recorded: 05/16/2014;  Comments: 4/25/14     [place] REMOVAL OF LUNG,LOBECTOMY      Description: Lung Lobectomy;  Proc Date: 01/01/2013;  Comments: left lower     AK TOTAL HIP ARTHROPLASTY Bilateral Left (2011), right (2012)     Description: Total Hip Replacement;   Recorded: 01/11/2013;  Comments: right 2003; ; left 2007 x2     SHOULDER SURGERY Left 2009?    rotator cuff repair       Family History   Problem Relation Age of Onset     Cancer Mother      breast and lung     Hypertension Father      No Medical Problems Son      Diabetes Neg Hx      Heart disease Neg Hx      Colon cancer Neg Hx      Prostate cancer Neg Hx        Social History     Social History     Marital status:      Spouse name: N/A     Number of children: N/A     Years of education: N/A     Occupational History     Not on file.     Social History Main Topics     Smoking status: Former Smoker     Packs/day: 1.00     Years: 40.00     Quit date: 1/1/2013     Smokeless tobacco: Never Used     Alcohol use 0.0 - 4.8 oz/week     0 - 4 Cans of beer, 0 - 4 Shots of liquor per week     Drug use: No     Sexual activity: No     Other Topics Concern     Not on file     Social History Narrative    .  1 child.  Retired from [employer] work. Lives in own home.       Current Outpatient Prescriptions   Medication Sig Dispense Refill     atorvastatin (LIPITOR) 40 MG tablet TAKE ONE TABLET BY MOUTH EVERY DAY 90 tablet 3     dexamethasone (DECADRON) 4 MG tablet TAKE ONE TABLET BY MOUTH TWICE A DAY FOR 3 DAYS STARTING THE DAY BEFORE PEMETREXED THERAPY 72 tablet 0     folic acid (FOLVITE) 1 MG tablet TAKE ONE TABLET BY MOUTH EVERY  tablet 3     furosemide (LASIX) 40 MG tablet Take 40 mg by mouth daily.       hydrOXYzine (VISTARIL) 50 MG capsule Take 1 capsule (50 mg total) by mouth bedtime as needed. 60 capsule 0     indomethacin (INDOCIN) 50 MG capsule TAKE ONE CAPSULE BY MOUTH THREE TIMES A DAY WITH FOOD AS NEEDED 30 capsule 12     IPRATROPIUM/ALBUTEROL SULFATE (IPRATROPIUM-ALBUTEROL INHL) Inhale 2 puffs 3 (three) times a day as needed.        lisinopril (PRINIVIL,ZESTRIL) 20 MG tablet TAKE ONE TABLET BY MOUTH TWICE A  tablet 1     LORazepam (ATIVAN) 0.5 MG tablet Take 1-2 tablets (0.5-1 mg total) by  mouth 2 (two) times a day as needed for anxiety. 60 tablet 1     magnesium oxide (MAGOX) 400 mg tablet 400mg 3 times daily 90 tablet 3     multivitamin (MULTIVITAMIN) per tablet Take 1 tablet by mouth daily.       omeprazole (PRILOSEC) 20 MG capsule Take 20 mg by mouth bedtime.       oxyCODONE-acetaminophen (PERCOCET) 5-325 mg per tablet TAKE ONE TO TWO TABLETS BY MOUTH EVERY 4 TO 6 HOURS AS NEEDED 60 tablet 0     PARoxetine (PAXIL) 40 MG tablet TAKE ONE TABLET BY MOUTH EVERY DAY 90 tablet 1     potassium chloride SA (K-DUR,KLOR-CON) 20 MEQ tablet Take 1 tablet (20 mEq total) by mouth daily. 30 tablet 6     prochlorperazine (COMPAZINE) 10 MG tablet Take 1 tablet (10 mg total) by mouth every 6 (six) hours as needed (For breakthrough nausea/vomiting). 30 tablet 1     carvedilol (COREG) 12.5 MG tablet Take 1 tablet (12.5 mg total) by mouth 2 (two) times a day with meals. 180 tablet 3     Current Facility-Administered Medications   Medication Dose Route Frequency Provider Last Rate Last Dose     cyanocobalamin injection 1,000 mcg  1,000 mcg Intramuscular Q30 Days Jag Li MD   1,000 mcg at 02/08/17 1255       Allergies   Allergen Reactions     Oxycontin [Oxycodone] Itching       Objective:     Vitals:    02/23/17 1051   BP: (!) 148/92   Pulse:    Resp:      Wt Readings from Last 3 Encounters:   02/23/17 207 lb (93.9 kg)   02/10/17 216 lb (98 kg)   02/06/17 216 lb 12.8 oz (98.3 kg)       General Appearance:   Alert, cooperative and in no acute distress.   HEENT:  No scleral icterus; the mucous membranes were pink and moist.   Neck: JVP normal. No HJR.   Chest: The spine was straight. The chest was symmetric.   Lungs:   Respirations unlabored; the lungs are clear to auscultation.   Cardiovascular:   Regular rhythm. S1 and S2 without murmur, clicks or rubs. Radial and posterior tibial pulses are intact and symmetrical.    Abdomen:  Soft, nontender, nondistended, bowel sounds present   Extremities: No  cyanosis, clubbing.1+ pitting bilateral lower extremity edema.   Skin: No xanthelasma.   Neurologic: Mood and affect are appropriate.         Lab Review   Lab Results   Component Value Date    CREATININE 1.06 02/06/2017    BUN 12 02/06/2017     02/06/2017    K 3.9 02/06/2017     02/06/2017    CO2 24 02/06/2017     Lab Results   Component Value Date    BNP 84 (H) 01/24/2017     BNP (pg/mL)   Date Value   01/24/2017 84 (H)   01/11/2017 88 (H)   05/10/2016 85 (H)     CREATININE (mg/dL)   Date Value   02/06/2017 1.06   01/24/2017 1.01   01/09/2017 1.21   12/12/2016 1.28       Cardiographics  NM pharmacological stress test: 11/15/2016  CONCLUSION:   1. Regadenosine infusion was subjectively and objectively negative.   2. Lexiscan stress nuclear study is negative for inducible myocardial   ischemia or infarction.          40 minutes were spent face to face with the patient with greater than 50% spent on education and counseling.      Sandra Mancera, [facility]   Heart Failure Clinic

## 2021-06-09 NOTE — PROGRESS NOTES
[facility] Daily Progress     Patient Name: Christian Rees  Date: 2017  Visit #: 3  PTA visit #:  2  Referral Diagnosis:Referral Diagnosis: Edema; Venous and Lymphatic; Right leg and Left leg  Referring provider: Leonie Oneill, *   Visit Diagnosis:     ICD-10-CM    1. Acquired lymphedema of leg I89.0    2. Low magnesium levels E83.42    3. Malignant neoplasm of lower lobe of lung, unspecified laterality C34.30    4. Bilateral leg pain M79.604     M79.605    5. Venous hypertension of both lower extremities I87.303          Assessment:   Pt tolerated today's visit well,no increased SOB noted  Decreased pain and tenderness  Calves significantly softer since SOC and progressively softer after each visit  Pt now independent with compression with minimal assist of spouse  All compression garments fit fairly well, socks slightly long but are manageable  Patient is benefitting from skilled physical therapy and is making steady progress toward functional goals.  Patient is appropriate to continue with skilled physical therapy intervention, as indicated by initial plan of care.    Goal Status:  No Data Recorded  Patient will have a decreased volume in : right;left;by 5%;by 10%;to decrease risk of infection;for better fit of clothing;for improved body image;for ease of movement;in 4 weeks  Patient will have decreased fibrosis, scar tissue for improved lymphatic mobilitiy : in 4 weeks  Patient will perform, verbalize self-management of: skin care;self-monitoring;exercise;massage;self-compression;compression wear;compression care;infection prevention;in 4 weeks    Continue with initial plan of care.  Continue MT,compression modification prn, self care education  Re measure volumes next visit  Subjective:     Pain Ratin mostly with tenderness to touch   Pt purchased new donning aide ,works well. Pt report he is able  To don independently, minimal assist with doffing garment    Objective:    Caregiver present: No    Observation of swelling:  is better, calves remain softer since last visit.     Volume measurements taken:  No      Skin condition is:  unchanged  Skin reddish, slightly fibrotic, dry, intact    Compression:Tube not worn at night  Velcro garment on L, nothing on R    Medication for infection:  No  SAO2 96%, HR  bpm      Treatment Today     TREATMENT MINUTES COMMENTS   Evaluation     Self-care/ Home management 5 Compression garments donned at end of visit   Manual therapy 50 Manual Lymphatic drainage neck, trunk, B LE's   Neuromuscular Re-education     Therapeutic Activity     Therapeutic Exercises 3 Reviewed  Lymphedema exercises 3-5 reps each   Gait training     Modality__________________                Total 58    Blank areas are intentional and mean the treatment did not include these items.       Rekha Weaver,MAGNOLIAT  2/27/2017

## 2021-06-10 NOTE — PROGRESS NOTES
"  Subjective:       Christian Rees is a 66 y.o. male who presents for a steroid injection into his left glenohumeral joint.  He last had this injection done on 8/22/16.  This is related to a Workmen's Compensation injury from 2007 where he fell on his left shoulder at work.  He has limited range of motion of the left shoulder.  He receives periodic steroid injections which help greatly with chronic pain in that area.  When the injections wear off, he develops chronic aching in the shoulder and inability to sleep on that side.    The following portions of the patient's history were reviewed and updated as appropriate: allergies, current medications, past medical history and problem list.    Review of Systems   Pertinent items are noted in HPI.      Objective:      /82 (Patient Site: Right Arm, Patient Position: Sitting, Cuff Size: Adult Regular)  Pulse 92  Resp (!) 88  Ht 5' 6\" (1.676 m)  Wt 200 lb 6.4 oz (90.9 kg)  BMI 32.35 kg/m2    General appearance: alert, appears stated age and cooperative  Extremities: extremities normal, atraumatic, no cyanosis or edema and very limited active ROM left shoulder        Assessment/Plan:      Pain in joint of left shoulder  After discussion of risks and benefits, patient wishes to have his left shoulder injected with steroid.  Patient was prepped and draped in the usual fashion and via a posterior approach, 40 mg of Depo-Medrol and 4 mL of lidocaine were injected into the left glenohumeral joint.  Patient tolerated the procedure well and there were no immediate complications.  He will return in at least 3 months for repeat injection as needed.  - methylPREDNISolone acetate injection 40 mg (DEPO-MEDROL); Inject 1 mL (40 mg total) into the joint once.  - lidocaine 10 mg/mL (1 %) injection 4 mL; Inject 4 mL into the joint once.        "

## 2021-06-10 NOTE — PROGRESS NOTES
Subjective:       Christian Rees is a 66 y.o. male who presents for initially a right shoulder corticosteroid injection.  He has known arthritis of both shoulders with limited range of motion of both shoulders as well.  He has had previous surgeries, but was told that further surgical intervention was unlikely to promote increased function.  He has had good relief of pain in the past with periodic corticosteroid injections.  He would also like to discuss a recent fall that he had at home.  He fell in his bathroom 5 days ago and hit the right side of his mid back on the toilet.  He is unable to see his back, but has been told that he has some bruising.  He describes 10/10 pain with certain movements, such as wiping his bottom or rolling over in bed.  If he finds a specific way of sitting, he can be pain-free at rest.  Lastly, he mentions that over the past month or so, he has had 3 episodes where he will feel panicky.  With 1 of these episodes, he was fishing with his son and awoke in the middle of the night feeling very short of breath.  He needed to go outside and get some air.  Another time he was in the shower and felt that he could not catch his breath.  Another time he was just sitting in his home and needed to go outside.  With this last episode, he actually had a cigarette which helped his breathing.  He is otherwise a non-smoker.  He does have obstructive sleep apnea and is very compliant with his CPAP.  He does score positively on a FELIPE-7 questionnaire today.  His anxiety for quite some time has not been under ideal control, however he noticed really good benefit from paroxetine initially and is reluctant to change this medicine.  His ongoing symptoms include feeling anxious, feeling irritable and a sense of worry.    The following portions of the patient's history were reviewed and updated as appropriate: allergies, current medications, past medical history and problem list.     Current Outpatient  "Prescriptions   Medication Sig     atorvastatin (LIPITOR) 40 MG tablet TAKE ONE TABLET BY MOUTH EVERY DAY     carvedilol (COREG) 12.5 MG tablet Take 1 tablet (12.5 mg total) by mouth 2 (two) times a day with meals.     folic acid (FOLVITE) 1 MG tablet TAKE ONE TABLET BY MOUTH EVERY DAY     hydrOXYzine (VISTARIL) 50 MG capsule Take 1 capsule (50 mg total) by mouth bedtime as needed.     indomethacin (INDOCIN) 50 MG capsule TAKE ONE CAPSULE BY MOUTH THREE TIMES A DAY WITH FOOD AS NEEDED     IPRATROPIUM/ALBUTEROL SULFATE (IPRATROPIUM-ALBUTEROL INHL) Inhale 2 puffs 3 (three) times a day as needed.      lisinopril (PRINIVIL,ZESTRIL) 20 MG tablet TAKE ONE TABLET BY MOUTH TWICE A DAY     magnesium oxide (MAGOX) 400 mg tablet 400mg 3 times daily     multivitamin (MULTIVITAMIN) per tablet Take 1 tablet by mouth daily.     omeprazole (PRILOSEC) 20 MG capsule Take 20 mg by mouth bedtime.     oxyCODONE-acetaminophen (PERCOCET) 5-325 mg per tablet TAKE ONE TO TWO TABLETS BY MOUTH EVERY 4 TO 6 HOURS AS NEEDED     PARoxetine (PAXIL) 40 MG tablet TAKE ONE TABLET BY MOUTH EVERY DAY     potassium chloride SA (K-DUR,KLOR-CON) 20 MEQ tablet Take 1 tablet (20 mEq total) by mouth daily.     furosemide (LASIX) 40 MG tablet Take 40 mg by mouth daily.         Review of Systems   A 12 point comprehensive review of systems was negative except as noted.      Objective:      /84 (Patient Site: Right Arm, Patient Position: Sitting, Cuff Size: Adult Regular)  Pulse 84  Resp 24  Ht 5' 6\" (1.676 m)  Wt 202 lb 14.4 oz (92 kg)  SpO2 94%  BMI 32.75 kg/m2   Oxygen saturation was tested with ambulation in the clinic today and ranged from 94-95%    General appearance: alert, appears stated age and cooperative  Back: symmetric, no curvature. ROM normal. No CVA tenderness., tenderness to palpation right lumbar musculature, bruising right lower ribcage  Lungs: diminished breath sounds throughout, consistent with previous, no " wheeze/rhonchi  Chest wall: right sided chest wall tenderness, posteriorly as above, bruising lower right ribcage  Heart: regular rate and rhythm, S1, S2 normal, no murmur, click, rub or gallop  Psychiatric: Speech is fluent and thought process is linear, mood is described as anxious, affect is reactive and appropriate        XR thoracic spine: No obvious acute compression fractures, overall thin bone density with degenerative narrowing throughout the spine, alignment appears normal (my read)     Assessment/Plan:       1. Shortness of breath/PND (paroxysmal nocturnal dyspnea)/Chronic Bronchospasm  Oxygen saturations are normal at rest and with ambulation in the clinic today.  I wonder if his nighttime panic issues have to do with paroxysmal nocturnal dyspnea.  I recommended overnight oximetry testing and will assist with arranging this.    2. Generalized anxiety disorder  We discussed at length today whether his mood could be contributing to his symptoms of panic.  He really thinks his breathing precipitates the panic rather than the other way around.  He continues to decline a change in his Paxil.  He will follow-up as needed in terms of his mood.    3. Fall, initial encounter/Acute right-sided thoracic back pain  Patient sustained no other injuries from his fall other than hitting his back.  No obvious rib fractures, mild bruising to the posterior rib cage.  No obvious compression fractures.  Discussed continued symptomatic treatments.  - XR Thoracic Spine 2 VWS    4. Pain in joint of right shoulder  After discussion of risks and benefits, patient wishes to have repeat corticosteroid injection performed to his right shoulder.  Via a posterior approach, after cleansing with Betadine, 4 cc of 1% lidocaine and 1 cc or 40 mg of Depo-Medrol were injected into the right glenohumeral joint.  Patient tolerated the procedure well and there were no immediate complications.  He can follow-up in 3-4 months for repeat  injection as needed.  - methylPREDNISolone acetate injection 40 mg (DEPO-MEDROL); Inject 1 mL (40 mg total) into the joint once.  - lidocaine 10 mg/mL (1 %) injection 4 mL; Inject 4 mL into the joint once.

## 2021-06-10 NOTE — PROGRESS NOTES
[facility] Discharge Summary  Patient Name: Christian Rees  Date: 4/12/2017  Referral Diagnosis:Referral Diagnosis: Edema; Venous and Lymphatic; Right leg and Left leg  Referring provider: Leonie Oneill, *        Visit Diagnosis:   1. Acquired lymphedema of leg     2. Low magnesium levels     3. Malignant neoplasm of lower lobe of lung, unspecified laterality     4. Bilateral leg pain     5. Venous hypertension of both lower extremities         Goals:    Patient will have a decreased volume in : right;left;by 5%;by 10%;to decrease risk of infection;for better fit of clothing;for improved body image;for ease of movement;in 4 weeks  Patient will have decreased fibrosis, scar tissue for improved lymphatic mobilitiy : in 4 weeks  Patient will perform, verbalize self-management of: skin care;self-monitoring;exercise;massage;self-compression;compression wear;compression care;infection prevention;in 4 weeks    Patient was seen for 3 visits from 2/20/17 to 2/27/17 with 0 missed appointments.  The patient attended therapy initially, but did not finish the therapy sessions prescribed.  Goals were not fully achieved. Explanation for goals not achieved: Pt did not finish session    Therapy will be discontinued at this time.  The patient will need a new referral to resume.    Thank you for your referral.  Ines Gonzalez  4/12/2017  11:24 AM

## 2021-06-11 NOTE — PROGRESS NOTES
Pt arrived ambulatory to clinic for SQ B-12 Injection.  Pt states that he usually gets them monthly.  Administered SQ injection into Left UA.  Pt tolerated procedure well, no s/s of bleeding or swelling at site.  Pt verbalized understanding of plan of care and return to clinic.

## 2021-06-11 NOTE — PROGRESS NOTES
[facility] Progress Note    Patient: Christian Rees  MRN: [id number]  Date of Service:         Reason for Visit    Chief Complaint   Patient presents with     HE Cancer     Malignant neoplasm of lower lobe of left lung       Assessment and Plan    Metastatic adenocarcinoma of the lung with lymph node and bone metastases diagnosed by biopsy in February 2016  Dyspnea on exertion  Hypertension  Anxiety/depression  Low magnesium  Pancytopenia with macrocytic anemia  Vitamin B12 deficiency    CT scans are personally reviewed and showed no recurrence of cancer.  Patient is reassured.  Will schedule repeat CT scan and follow-up again in 3 months.    Breathing is stable and blood pressure is better controlled.  Anxiety and depression are under good control as well.    He is noted with mild pancytopenia.  This could still be residual from previous chemotherapy.  Will do anemia workup at his next visit.  He is requesting a B12 shot today as he missed this through his primary physician and he will get this today.        Plan: Follow-up in 3 months with repeat CT scans and lab work  Anemia workup with return visit    Measurable disease: CT of the chest          Current therapy: Observation        Treatment history:   Alimta maintenance, 8 cycles completed in December 2016    Patient has completed 5 cycles of carboplatin and Alimta with dose reduction for recurrent and metastatic lung cancer;  last cycle in June 2016  Left lower lobectomy in April 2013   He then received 4 cycles of chemotherapy as part of the ECOG 1505 clinical trial   He also received maintenance Avastin through December 2013 on the same clinical trial, this was stopped because of hip surgery   Lung cancer    Staging form: Lung, AJCC 7th Edition      Clinical: Stage IV (T1b, N3, M1b) - Signed by Erica Borrero CNP on 3/14/2016    ECOG Performance   ECOG Performance Status: 1    Distress Assessment  Distress Assessment  Score: No distress    Pain           Problem List    1. Lung cancer  CT Chest Abdomen Pelvis Without Oral With IV Contrast    HM1(CBC and Differential)    Reticulocytes    Vitamin B12    Ferritin    cyanocobalamin injection 1,000 mcg        CC: Cristela De Jesus MD    ______________________________________________________________________________    History of Present Illness    Mr. Christian Rees returns for evaluation.  Overall he is doing and feeling well.  Would like to get a B12 shot here today as he missed this earlier this month.  No headaches or dizziness.  Breathing is stable.  No new bone or abdominal pain.  Appetite and weight are stable.  No change of bowels or urine.  No bleeding or rash.  ECOG status is 1.      Pain Status  Currently in Pain: No/denies    Review of Systems    Constitutional  Constitutional (WDL): Exceptions to WDL  Fatigue: Fatigue relieved by rest  Neurosensory  Neurosensory (WDL): Exceptions to WDL  Cardiovascular  Cardiovascular (WDL): Exceptions to WDL  Edema: Yes (Lt leg mostly. )  Pulmonary  Respiratory (WDL): Exceptions to WDL  Cough: Mild symptoms, nonprescription intervention indicated  Dyspnea: Shortness of breath with minimal exertion, limiting instrumental ADL  Gastrointestinal  Gastrointestinal (WDL): All gastrointestinal elements are within defined limits  Genitourinary  Genitourinary (WDL): All genitourinary elements are within defined limits  Integumentary  Integumentary (WDL): All integumentary elements are within defined limits  Patient Coping  Patient Coping: Accepting  Distress Assessment  Distress Assessment Score: No distress  Accompanied by  Accompanied by: Alone    Past History  Past Medical History:   Diagnosis Date     Anxiety      Closed Posterior Dislocation Of The Hip     Created by Conversion      Depression      DVT (deep venous thrombosis)      Gout      Heart failure      Hypertension      Lung cancer     Left lung     Persistent Atrial  Fibrillation     Created by [facility] Annotation: Apr 17 2014 11:48AM - Ismael Holbrooket: 3/14 new afib  with contr VR with hip replacement [id number] score of  3 with 1  point each for age 65 and older, HTN and CHF hx---new warfarin start          Past Surgical History:   Procedure Laterality Date     LUNG SURGERY       [facility] CARDIOVERSION ELECTIVE ARRHYTHMIA EXTERNAL      Description: Elective Cardioversion External;  Recorded: 05/16/2014;  Comments: 4/25/14     [facility] REMOVAL OF LUNG,LOBECTOMY      Description: Lung Lobectomy;  Proc Date: 01/01/2013;  Comments: left lower     CA TOTAL HIP ARTHROPLASTY Bilateral Left (2011), right (2012)     Description: Total Hip Replacement;  Recorded: 01/11/2013;  Comments: right 2003; ; left 2007 x2     SHOULDER SURGERY Left 2009?    rotator cuff repair       Physical Exam    Recent Vitals 7/13/2017   Height -   Weight 200 lbs   BSA (m2) -   /76   Pulse 91   Temp 98.4   Temp src 1   SpO2 97   Some recent data might be hidden       GENERAL: Alert and oriented. Seated comfortably. In no distress.    HEAD: Atraumatic and normocephalic.  Has a full head of hair.    EYES: JULIET, EOMI.  No pallor.  No icterus.    Oral cavity: no mucosal lesion or tonsillar enlargement.    NECK: supple. JVP normal.  No thyroid enlargement.    LYMPH NODES: No palpable, cervical, axillary or inguinal lymphadenopathy.    CHEST: clear to auscultation bilaterally.  Resonant to percussion throughout bilaterally.  Symmetrical breath movements bilaterally.    CVS: S1 and S2 are heard. Regular rate and rhythm.  No murmur or gallop or rub heard.    ABDOMEN: Soft. Not tender. Not distended.  No palpable hepatomegaly or splenomegaly.  No other mass palpable.  Bowel sounds heard.    EXTREMITIES: Warm.  No edema.    SKIN: no rash, or bruising or purpura.  CNS: Nonfocal        Lab Results    Recent Results (from the past 168 hour(s))   POCT creatinine   Result Value Ref Range    POC Creatinine  1.0 mg/dL   POCT GFR   Result Value Ref Range    POC GFR AMER AF HE >60  >60 mL/min/1.73m2    POC GFR NON AMER AF >60  >60 mL/min/1.73m2   HM1 (CBC with Diff)   Result Value Ref Range    WBC 3.7 (L) 4.0 - 11.0 thou/uL    RBC 2.82 (L) 4.40 - 6.20 mill/uL    Hemoglobin 10.1 (L) 14.0 - 18.0 g/dL    Hematocrit 30.9 (L) 40.0 - 54.0 %     (H) 80 - 100 fL    MCH 35.8 (H) 27.0 - 34.0 pg    MCHC 32.7 32.0 - 36.0 g/dL    RDW 14.3 11.0 - 14.5 %    Platelets 125 (L) 140 - 440 thou/uL    MPV 9.4 8.5 - 12.5 fL    Neutrophils % 56 50 - 70 %    Lymphocytes % 32 20 - 40 %    Monocytes % 11 (H) 2 - 10 %    Eosinophils % 1 0 - 6 %    Basophils % 1 0 - 2 %    Neutrophils Absolute 2.0 2.0 - 7.7 thou/uL    Lymphocytes Absolute 1.2 0.8 - 4.4 thou/uL    Monocytes Absolute 0.4 0.0 - 0.9 thou/uL    Eosinophils Absolute 0.1 0.0 - 0.4 thou/uL    Basophils Absolute 0.0 0.0 - 0.2 thou/uL       Imaging    Ct Chest Abdomen Pelvis Without Oral With Iv Contrast    Result Date: 7/11/2017  CT CHEST, ABDOMEN, AND PELVIS 7/11/2017 11:07 AM      INDICATION: fu lung  cancer TECHNIQUE: CT chest, abdomen, and pelvis. Dose reduction techniques were used. IV CONTRAST: Iohexol (Omni) 100 mL COMPARISON: 4/3/2017 FINDINGS: CHEST: Left lower lobectomy. Stable scarring in small amount of pleural fluid at the left lung base, unchanged. Tiny groundglass nodular opacities in the right upper lobe are unchanged. Mild groundglass opacity anteriorly in the right lower lobe is new and probably inflammatory. Small adjacent nodule along the right major fissure again seen. No thoracic lymphadenopathy. Marked coronary artery calcification.  ABDOMEN: Normal liver, pancreas, adrenal glands, and kidneys. Cholelithiasis. Stable, likely benign, splenic lesions. Diffuse atherosclerotic disease with moderate to severe narrowing of the proximal SMA. PELVIS: No free fluid or lymphadenopathy. MUSCULOSKELETAL: Bilateral hip arthroplasties. Degenerative changes in the spine. Post  thoracotomy changes on the left.     CONCLUSION: 1.  No evidence of recurrent or metastatic disease in the chest, abdomen, or pelvis. 2.  Right lung groundglass opacities are again seen. Continued follow-up suggested.        Signed by: Jag Li MD

## 2021-06-12 NOTE — PROGRESS NOTES
Care Guide called patient.  If this patient is returning my call, please transfer to Karolyn at ext [phone].    Updates:   7/31/17- 1st attempt

## 2021-06-12 NOTE — PROGRESS NOTES
Updates:   8/7/17- Scheduled F/U Call: 2nd attempt - Patient returned my call. He is doing well right now besides shooting pain in his neck/back over the weekend. He will see PCP partner in the clinic if needed. He has completed his goals. BP is good right now. We discussed maintenance and he agreed. Will send chart for RN to review and update Emergency Plan   7/31/17-Scheduled F/U Call: 1st attempt

## 2021-06-12 NOTE — PROGRESS NOTES
My Clinic Care Coordination Wellness Plan  This Maintenance Wellness Plan provides private information in regards to the work I have done with my Care Team from my Primary Care Clinic.  This document provides insight on the goals I have worked hard to achieve.  My Care Team congratulates me on my journey to become well.  With the assistance of my Clinic Care Guide and Primary Care Physician,  I have succeeded in improving areas of my health that I identified as barriers to becoming well.  I will continue to seek wellness and use the skills I have obtained to further my journey.  My Care Guide will follow up with me in 3 months.  In the meantime, if I should have any questions or concerns I will contact my Care Guide.     [address], [address]  [place]  [address]  [phone]      My Preferred Method of Contact:  Phone: [phone]    My Primary/Preferred Language:  English    Preferred Learning Style:  Unknown    Emergency Contact: Extended Emergency Contact Information  Primary Emergency Contact: Katarzyna Rees  Address: [address] [place] [address] [id number] [place] [address]  Home Phone: [phone]  Relation: Spouse  Secondary Emergency Contact: Damien Rees   [facility]  Home Phone: [phone]  Relation: Child     PCP:  Cristela De Jesus MD  Specialists:    Care Team            Cristela De Jesus MD PCP - General, Family Medicine    [phone]     Jag Li MD Physician, Hematology and Oncology    [phone]     Kika Shukla RN (Inactive) Registered Nurse, Hematology and Oncology    [phone]     Cancer Care Nurse Navigator [phone]    Justyna CONN [facility] Care Coordination Care Guide, Clinic Care Coordination        Accomplishments:  Goals        Patient Stated      COMPLETED: I would like to lower my blood pressure by taking the medication Dr De Jesus prescribed.  (pt-stated)            Action  steps to achieve this goal  1.  I will continue to take my medication as prescribed.   2.  I will continue to follow up with Dr. De Jesus as needed.     Last updated 8/7/17  Date goal set:  7/26/13        COMPLETED: I would like to not have hip pain so I can get my life back.  (pt-stated)            Action steps to achieve this goal  1.  I will continue to follow up with my ortho MD.  2.  I will do the exercises that ortho recommends to me.   3.  I will talk with my provider regarding any pain I have.     Date goal set:  7/26/13  Date goal updated: 8/7/17            Advanced Directive/Living Will: On file with the clinic    Clinical Emergency Plan  Emergency Plan Recommendation:     When to Use the Emergency Department (ED)  An emergency means you could die if you don t get care quickly. Or you could be hurt permanently (disabled). Read below to know when to use -- and when not to use -- an emergency department (also called ED).     Dangers to your life  Here are examples of emergencies. These need immediate care:  A hard time breathing  Severe chest pain  Choking  Severe bleeding  Suddenly not able to move or speak  Blacking out (fainting)`  Poisoning     Dangers of permanent injuries  Here are other emergencies. These also need immediate care:  Deep cuts or severe burns  Broken bones, or sudden severe pain and swelling in a joint     When it s an emergency  If you have an emergency, follow these steps:     1. Go to the nearest emergency department  If you can, go to the hospital ED closest to you right away.  If you cannot get there right away, or if it is not safe to take yourself, call 911 or your police emergency number.  2. Call your primary care doctor  Tell your doctor about the emergency. Call within 24 hours of going to the ED.  If you cannot call, have someone call for you.  Go to your doctor (not the ED) for any follow-up care.     When it s not an emergency  If a problem is not an emergency, follow these  steps:     1. Call your primary care doctor  If you don t know the name of your doctor, call your health plan.  If you cannot call, have someone call for you.  2. Follow instructions  Your doctor will tell you what you should do.  You may be told to see your doctor right away. You may be told to go to the ED. Or you may be told to go to an urgent care center.  Follow your doctor s advice.    Depression  Everyone feels down at times. The blues are a natural part of life. But an unhappy period that s intense or lasts for more than a couple of weeks can be a sign of depression. Depression is a serious illness. It can disrupt the lives of family and friends. If you know someone you think may be depressed, find out what you can do to help.     Know the serious signals  Warning signals for suicide include:    Threats or talk of suicide    Statements such as  I won t be a problem much longer  or  Nothing matters     Giving away possessions or making a will or  arrangements    Buying a gun or other weapon    Sudden, unexplained cheerfulness or calm after a period of depression  If you notice any of these signs, get help right away. Call a health care professional, mental health clinic, or suicide hotline and ask what action to take. In an emergency, don t hesitate to call the police.     [facility] Mental Health Crisis Lines:  [facility] [phone]  [facility] [phone]  [facility] [phone]  [facility] [phone]  [facility], Adults [phone]  [facility], Children [phone]  [facility], Adults [phone]  [facility], Children [phone]       All [facility] clinic patients have access to a Nurse 24 hours a day, 7 days a week.  If you have questions or want advice from a Nurse, please know [facility] is here for you.  You can call your clinic and they will connect you or you can call Care Connection at [phone].  [facility] also has Walk In Care clinics  in multiple locations.  Call the number listed above for more information about our Walk In Care clinics or visit the [employer] website at [email or web address].

## 2021-06-13 NOTE — PROGRESS NOTES
[facility]ology [facility]cology Progress Note    Patient: Christian Rees  MRN: [id number]  Date of Service:         Reason for Visit    Chief Complaint   Patient presents with     HE Cancer     Lung cancer       Assessment and Plan    Metastatic adenocarcinoma of the lung with lymph node and bone metastases diagnosed by biopsy in February 2016  Initial diagnosis of lung cancer with left lower lobectomy in April 2013  Dyspnea on exertion  Hypertension  Anxiety/depression  Low magnesium  Pancytopenia with macrocytic anemia  Vitamin B12 deficiency    CT scans reviewed and show no clear evidence of relapse of lung cancer.  Patient is reassured.  We will continue with observation.  I will see him again in 3 months with lab work and a PET scan for reevaluation.    Blood pressure, anxiety and depression are under good control.  He will continue magnesium supplementation and I refilled his prescription.    Anemia workup results are pending.    Plan: Follow-up in 3 months with PET scan      Measurable disease: CT of the chest/PET scan          Current therapy: Observation        Treatment history:   Alimta maintenance, 8 cycles completed in December 2016    Patient has completed 5 cycles of carboplatin and Alimta with dose reduction for recurrent and metastatic lung cancer;  last cycle in June 2016  Left lower lobectomy in April 2013   He then received 4 cycles of chemotherapy as part of the ECOG 1505 clinical trial   He also received maintenance Avastin through December 2013 on the same clinical trial, this was stopped because of hip surgery     Lung cancer    Staging form: Lung, AJCC 7th Edition      Clinical: Stage IV (T1b, N3, M1b) - Signed by Erica Borrero CNP on 3/14/2016    ECOG Performance   ECOG Performance Status: 1    Distress Assessment  Distress Assessment Score: No distress    Pain  Pain Score (Initial OR Reassessment): 5        Problem List    1. Lung cancer  NM PET CT Skull to Mid Thigh     magnesium oxide (MAGOX) 400 mg tablet    Comprehensive Metabolic Panel    Magnesium    HM1(CBC and Differential)   2. Malignant neoplasm of lower lobe of left lung     3. Malignant neoplasm of hilus of left lung          CC: Cristela De Jesus MD    ______________________________________________________________________________    History of Present Illness    Mr. Christian Rees returns for evaluation.  He was seen 3 months ago.  Overall is doing well.  No new headaches dizziness or focal weakness.  No change with his breathing.  Denies any new bone or abdominal pain.  Appetite and weight are stable.  ECOG status is 1.  He continues on vitamin B12 shots monthly through his primary.  Denies any other new problems.        Pain Status  Currently in Pain: Yes    Review of Systems    Constitutional  Constitutional (WDL): Exceptions to WDL  Fatigue: Fatigue not relieved by rest - Limiting instrumental ADL (Hips start hurting with any continuous activity. )  Neurosensory  Neurosensory (WDL): Exceptions to WDL  Ataxia: Moderate symptoms, limiting instrumental ADL (Bilat hips.)  Cardiovascular  Cardiovascular (WDL): Exceptions to WDL  Edema: Yes (Left leg mostly.)  Pulmonary  Respiratory (WDL): Exceptions to WDL  Dyspnea: Shortness of breath with minimal exertion, limiting instrumental ADL  Gastrointestinal  Gastrointestinal (WDL): Exceptions to WDL  Diarrhea: Increase of <4 stools per day over baseline, mild increase in ostomy output compared to baseline  Genitourinary  Genitourinary (WDL): All genitourinary elements are within defined limits  Integumentary  Integumentary (WDL): All integumentary elements are within defined limits  Patient Coping  Patient Coping: Accepting  Distress Assessment  Distress Assessment Score: No distress  Accompanied by  Accompanied by: Alone    Past History  Past Medical History:   Diagnosis Date     Anxiety      Closed Posterior Dislocation Of The Hip     Created by Conversion       Depression      DVT (deep venous thrombosis)      Gout      Heart failure      Hypertension      Lung cancer     Left lung     Persistent Atrial Fibrillation     Created by [facility] [facility] Annotation: Apr 17 2014 11:48AM - Deedee Holbrook: 3/14 new afib  with contr VR with hip replacement [id number] score of  3 with 1  point each for age 65 and older, HTN and CHF hx---new warfarin start          Past Surgical History:   Procedure Laterality Date     LUNG SURGERY       ND CARDIOVERSION ELECTIVE ARRHYTHMIA EXTERNAL      Description: Elective Cardioversion External;  Recorded: 05/16/2014;  Comments: 4/25/14     ND REMOVAL OF LUNG,LOBECTOMY      Description: Lung Lobectomy;  Proc Date: 01/01/2013;  Comments: left lower     ND TOTAL HIP ARTHROPLASTY Bilateral Left (2011), right (2012)     Description: Total Hip Replacement;  Recorded: 01/11/2013;  Comments: right 2003; ; left 2007 x2     SHOULDER SURGERY Left 2009?    rotator cuff repair       Physical Exam    Recent Vitals 10/12/2017   Height -   Weight 198 lbs 11 oz   BSA (m2) -   /57   Pulse 80   Temp 98.4   Temp src 1   SpO2 96   Some recent data might be hidden       GENERAL: Alert and oriented. Seated comfortably. In no distress.    HEAD: Atraumatic and normocephalic.  Has a full head of hair.    EYES: JULIET, EOMI.  No pallor.  No icterus.    Oral cavity: no mucosal lesion or tonsillar enlargement.    NECK: supple. JVP normal.  No thyroid enlargement.    LYMPH NODES: No palpable, cervical, axillary or inguinal lymphadenopathy.    CHEST: clear to auscultation bilaterally.  Resonant to percussion throughout bilaterally.  Symmetrical breath movements bilaterally.    CVS: S1 and S2 are heard. Regular rate and rhythm.  No murmur or gallop or rub heard.    ABDOMEN: Soft. Not tender. Not distended.  No palpable hepatomegaly or splenomegaly.  No other mass palpable.  Bowel sounds heard.    EXTREMITIES: Warm.  No edema.    SKIN: no rash, or bruising  or purpura.      CNS: Nonfocal        Lab Results    Recent Results (from the past 168 hour(s))   POCT creatinine   Result Value Ref Range    POC Creatinine 0.9 mg/dL   POCT GFR   Result Value Ref Range    POC GFR AMER AF HE >60  >60 mL/min/1.73m2    POC GFR NON AMER AF >60  >60 mL/min/1.73m2   Reticulocytes   Result Value Ref Range    Retic Absolute Count 0.086 0.010 - 0.110 mill/uL   HM1 (CBC with Diff)   Result Value Ref Range    WBC 4.3 4.0 - 11.0 thou/uL    RBC 2.87 (L) 4.40 - 6.20 mill/uL    Hemoglobin 10.5 (L) 14.0 - 18.0 g/dL    Hematocrit 30.9 (L) 40.0 - 54.0 %     (H) 80 - 100 fL    MCH 36.6 (H) 27.0 - 34.0 pg    MCHC 34.0 32.0 - 36.0 g/dL    RDW 13.4 11.0 - 14.5 %    Platelets 112 (L) 140 - 440 thou/uL    MPV 9.8 8.5 - 12.5 fL    Neutrophils % 55 50 - 70 %    Lymphocytes % 33 20 - 40 %    Monocytes % 10 2 - 10 %    Eosinophils % 1 0 - 6 %    Basophils % 1 0 - 2 %    Neutrophils Absolute 2.3 2.0 - 7.7 thou/uL    Lymphocytes Absolute 1.4 0.8 - 4.4 thou/uL    Monocytes Absolute 0.4 0.0 - 0.9 thou/uL    Eosinophils Absolute 0.0 0.0 - 0.4 thou/uL    Basophils Absolute 0.0 0.0 - 0.2 thou/uL       Imaging    Ct Chest Abdomen Pelvis Without Oral With Iv Contrast    Result Date: 10/10/2017  CT CHEST, ABDOMEN, AND PELVIS 10/10/2017 10:45 AM      INDICATION: F/U lung cancer. TECHNIQUE: CT chest, abdomen, and pelvis. Dose reduction techniques were used. IV CONTRAST: Iohexol (Omni) 100 mL. COMPARISON: CT scan of the chest, abdomen and pelvis, 7/11/2017. FINDINGS: CHEST: There is a 7 x 5 mm pulmonary nodule involving the medial aspect of the right upper lobe (series 3 image 33). This compares with 5 x 4 mm previously. This nodule demonstrates a tree-in-bud appearance, most suggestive of a postinfectious process. There are several other smaller groundglass and solid right lung pulmonary nodules identified which have not significantly changed. Left lower lobectomy. Small amount of pleural fluid is seen on the  left. Heart size is stable. Thoracic aorta is normal in  caliber with diffuse atherosclerotic calcification. No lymphadenopathy.  ABDOMEN: The liver is low-dense in appearance, consistent with hepatic steatosis. The pancreas, adrenal glands and kidneys are grossly normal. Stones are present within the gallbladder. Low dense lesion within the posterior aspect of the spleen is stable and of questionable significance. The abdominal aorta is caliber with diffuse atherosclerotic calcification. No lymphadenopathy. No bowel obstruction or abnormal bowel wall thickening. PELVIS: The appendix is normal. The urinary bladder is grossly normal. No free fluid or lymphadenopathy. MUSCULOSKELETAL: Bilateral hip arthroplasties. No suspicious osseous lesions.     CONCLUSION: 1.  No compelling evidence for recurrent or metastatic disease. There are several right lung pulmonary nodules, one of which has increased in size since comparison CT scan. This nodule however demonstrates a similar tree-in-bud appearance, suggesting sequela of infectious process. Remainder of the solid and groundglass nodules are stable.        Signed by: Jag Li MD

## 2021-06-13 NOTE — PROGRESS NOTES
"  Subjective:       Christian Rees is a 67 y.o. male who presents for primarily a steroid injection into his right shoulder.  This was last performed on 5/19/17 and gave patient some prolonged relief.  To review his history, he has advanced osteoarthritis of both shoulders.  Surgeries have been deemed no longer an option for him.  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  Injections have been very helpful for pain control.  He also wonders if he could have a refill of Percocet.  He uses this very sparingly for chronic shoulder pains, back pains, and hip pains.  He has had replacements of both hips, has had dislocations of both hips.  He has osteoarthritis of multiple sites.  He also has metastatic lung cancer, but is currently stable.  He is following with oncology.  He also has hypertension which is well controlled, hyperlipidemia on Lipitor, depression with anxiety on Paxil, lower extremity edema for which he takes furosemide.  In terms of his mood, he has had occasional symptoms of panic.  He needs to go outside and breathe in some fresh air to get this to resolve.  He has not tried taking hydroxyzine for this.  He has not been taking hydroxyzine at bedtime as he really did not find this helpful.  Lastly, patient mentions that he has frequent loose stools, 6-8 times daily.  He describes these as watery, nonbloody.  He has had this for \"as long as he can remember\".  His last colonoscopy was performed in 2004, showed a polyp and biopsy results are not available.  Patient states that he was told by his oncologist that he is on a chemotherapy agent that is also used for colon cancer, so he does not need to pursue any further colonoscopies.    Labs Reviewed:  Lab Results   Component Value Date    WBC 3.7 (L) 07/13/2017    HGB 10.1 (L) 07/13/2017    HCT 30.9 (L) 07/13/2017     (L) 07/13/2017    CHOL 199 02/22/2016    TRIG 251 (H) 02/22/2016    HDL 70 02/22/2016    LDLDIRECT 92 01/05/2015    ALT 27 " 07/13/2017    AST 43 (H) 07/13/2017     07/13/2017    K 4.0 07/13/2017     07/13/2017    CREATININE 0.91 07/13/2017    BUN 13 07/13/2017    CO2 29 07/13/2017    TSH 1.29 07/25/2014    PSA 0.7 02/22/2016    INR 1.10 01/24/2017       The following portions of the patient's history were reviewed and updated as appropriate: allergies, current medications, past medical history and problem list.    Current Outpatient Prescriptions   Medication Sig     aspirin 81 MG EC tablet Take 1 tablet (81 mg total) by mouth daily. Pt taking every other day or as tolerated     atorvastatin (LIPITOR) 40 MG tablet TAKE ONE TABLET BY MOUTH EVERY DAY     carvedilol (COREG) 12.5 MG tablet Take 1 tablet (12.5 mg total) by mouth 2 (two) times a day with meals.     folic acid (FOLVITE) 1 MG tablet TAKE ONE TABLET BY MOUTH EVERY DAY     furosemide (LASIX) 40 MG tablet TAKE ONE TABLET BY MOUTH EVERY DAY     indomethacin (INDOCIN) 50 MG capsule TAKE ONE CAPSULE BY MOUTH THREE TIMES A DAY WITH FOOD AS NEEDED     IPRATROPIUM/ALBUTEROL SULFATE (IPRATROPIUM-ALBUTEROL INHL) Inhale 2 puffs 3 (three) times a day as needed.      lisinopril (PRINIVIL,ZESTRIL) 20 MG tablet Take 1 tablet (20 mg total) by mouth 2 (two) times a day.     magnesium oxide (MAGOX) 400 mg tablet 400mg 3 times daily     multivitamin (MULTIVITAMIN) per tablet Take 1 tablet by mouth daily.     omeprazole (PRILOSEC) 20 MG capsule Take 20 mg by mouth bedtime.     oxyCODONE-acetaminophen (PERCOCET) 5-325 mg per tablet Take 1 tablet by mouth every 8 (eight) hours as needed for pain.     PARoxetine (PAXIL) 40 MG tablet TAKE ONE TABLET BY MOUTH EVERY DAY     potassium chloride SA (K-DUR,KLOR-CON) 20 MEQ tablet Take 1 tablet (20 mEq total) by mouth daily.         Review of Systems   A 12 point comprehensive review of systems was negative except as noted.      Objective:      /84 (Patient Site: Left Arm, Patient Position: Sitting, Cuff Size: Adult Regular)  Pulse 80  Temp  "97.8  F (36.6  C) (Oral)   Resp 20  Ht 5' 6\" (1.676 m)  Wt 195 lb 9 oz (88.7 kg)  BMI 31.56 kg/m2    General appearance: alert, appears stated age and cooperative  Head: Normocephalic, without obvious abnormality, atraumatic  Eyes: conjunctivae clear, sclerae anicteric  Lungs: diminished breath sounds bilaterally and consistent with previous, no wheeze/rhonchi  Heart: regular rate and rhythm, S1, S2 normal, no murmur, click, rub or gallop  Extremities: 1+ pitting edema bilateral ankles  Neurologic: Grossly normal  Psychiatric: speech is fluent and thought process is linear, affect is reactive and bright, mood is described as mildly anxious        Assessment/Plan:       1. Pain in joint of right shoulder  After a discussion of risks and benefits, patient wishes to have repeat corticosteroid injection performed.  Area over the posterior shoulder was prepped with rubbing alcohol and Betadine and 4 mL of 2% Xylocaine and 1 mL or 40 mg of Depo-Medrol were injected via a posterior approach into the right glenohumeral joint.  Patient tolerated the procedure well and there were no immediate complications.  - methylPREDNISolone acetate injection 40 mg (DEPO-MEDROL); Inject 1 mL (40 mg total) into the joint once.  - lidocaine 20 mg/mL (2 %) injection 4 mL; Inject 4 mL into the joint once.  - oxyCODONE-acetaminophen (PERCOCET) 5-325 mg per tablet; Take 1 tablet by mouth every 8 (eight) hours as needed for pain.  Dispense: 60 tablet; Refill: 0    2. Primary osteoarthritis involving multiple joints  Discussed at length today the risks versus benefits of use of chronic narcotic medications.  Patient has metastatic cancer, along with severe osteoarthritis of multiple sites.  He uses these medications sparingly and very appropriately.  Went over her controlled substance agreement today and this was signed.  I will give him enough Percocet to be used 1-2 times daily.  He knows to follow-up in person in 3 months.  - " oxyCODONE-acetaminophen (PERCOCET) 5-325 mg per tablet; Take 1 tablet by mouth every 8 (eight) hours as needed for pain.  Dispense: 60 tablet; Refill: 0    3. Pain in joint involving pelvic region and thigh, unspecified laterality  See above #2.  - oxyCODONE-acetaminophen (PERCOCET) 5-325 mg per tablet; Take 1 tablet by mouth every 8 (eight) hours as needed for pain.  Dispense: 60 tablet; Refill: 0    4. Generalized anxiety disorder  Patient has occasional symptoms of panic despite taking Paxil daily.  Discussed that he could try hydroxyzine for this.  Alternatively, we could try a small dose of benzodiazepine.  We will readdress this at a future visit.    5. Mixed hyperlipidemia  Added a lipid profile that patient will perform this week with his chemotherapy labs.  - Lipid [place]iebach FASTING; Future    6. Essential hypertension  Well-controlled on current regimen of carvedilol and lisinopril.  - Lipid [place] FASTING; Future    7. Localized edema  This is stable.  Patient continues on furosemide and a low-sodium diet.    8. Functional diarrhea  We will attempt to track down biopsy results from his last colonoscopy.  This is nonbloody and he has no abdominal pain with this, it is a chronic symptom for him.  He will try adding Metamucil daily to see if this can bulk up his loose stools.  He will also try loperamide.  Follow-up as needed.       I spent a total of 40 minutes with the patient today, greater than 50% in face-to-face counseling and coordination of care in relation to the above issues.

## 2021-06-13 NOTE — PROGRESS NOTES
F/U  --Last seen 10/5/15   --Referred back by PCP Dr. De Jesus  --C/O right low back pain that started 10/1/17  --Xray lumbar spine 10/25/17  --No hx of back surgery, injection, MR/CT spine, PT    Medication  --OTC Aleve PRN  --Percocet 5-325 mg PRN per pt, Rx 10/2/17 #60

## 2021-06-13 NOTE — PROGRESS NOTES
Assessment/Plan:      Radicular pain of right lower back  No recent injury, gradual onset.  Lumbar spine XR shows significant degenerative changes, also curvature of lumbar spine.  Patient currently takes Percocet for chronic pain related to hips and shoulders due to severe arthritis and several hip surgeries.  Recommended referral to spine care for further evaluation and recommendations.  No new lesions found on CT scan by [facility], PET scan scheduled.  - XR Lumbar Spine 2 or 3 VWS; Future  - Ambulatory referral to Spine Care        Subjective:       Christian Rees is a 67 y.o. male who presents for evaluation of right buttock pain that radiates down his posterior thigh to the knee.  This started at the beginning of the month, has been gradually worsening.  He had no recent injury to the back or hip.  He has not had a pain similar to this in the past, it feels different from previous hip pains.  He denies any numbness or tingling in the leg.  The pain is not present when he is sitting, only when he is up walking.    The following portions of the patient's history were reviewed and updated as appropriate: allergies, current medications, past medical history, past surgical history and problem list.      Current Outpatient Prescriptions:      aspirin 81 MG EC tablet, Take 1 tablet (81 mg total) by mouth daily. Pt taking every other day or as tolerated, Disp: 150 tablet, Rfl: 2     atorvastatin (LIPITOR) 40 MG tablet, TAKE ONE TABLET BY MOUTH EVERY DAY, Disp: 90 tablet, Rfl: 2     carvedilol (COREG) 12.5 MG tablet, Take 1 tablet (12.5 mg total) by mouth 2 (two) times a day with meals., Disp: 180 tablet, Rfl: 3     folic acid (FOLVITE) 1 MG tablet, TAKE ONE TABLET BY MOUTH EVERY DAY, Disp: 100 tablet, Rfl: 3     furosemide (LASIX) 40 MG tablet, TAKE ONE TABLET BY MOUTH EVERY DAY, Disp: 90 tablet, Rfl: 3     IPRATROPIUM/ALBUTEROL SULFATE (IPRATROPIUM-ALBUTEROL INHL), Inhale 2 puffs 3 (three) times a day as  "needed. , Disp: , Rfl:      lisinopril (PRINIVIL,ZESTRIL) 20 MG tablet, Take 1 tablet (20 mg total) by mouth 2 (two) times a day., Disp: 180 tablet, Rfl: 3     magnesium oxide (MAGOX) 400 mg tablet, 400mg daily, Disp: 90 tablet, Rfl: 3     multivitamin (MULTIVITAMIN) per tablet, Take 1 tablet by mouth daily., Disp: , Rfl:      omeprazole (PRILOSEC) 20 MG capsule, Take 20 mg by mouth bedtime., Disp: , Rfl:      PARoxetine (PAXIL) 40 MG tablet, TAKE ONE TABLET BY MOUTH EVERY DAY, Disp: 90 tablet, Rfl: 3     potassium chloride SA (K-DUR,KLOR-CON) 20 MEQ tablet, Take 1 tablet (20 mEq total) by mouth daily., Disp: 30 tablet, Rfl: 6     indomethacin (INDOCIN) 50 MG capsule, TAKE ONE CAPSULE BY MOUTH THREE TIMES A DAY WITH FOOD AS NEEDED, Disp: 30 capsule, Rfl: 12     oxyCODONE-acetaminophen (PERCOCET) 5-325 mg per tablet, Take 1 tablet by mouth every 8 (eight) hours as needed for pain., Disp: 60 tablet, Rfl: 0    Current Facility-Administered Medications:      cyanocobalamin injection 1,000 mcg, 1,000 mcg, Intramuscular, Q30 Days, Jag Li MD, 1,000 mcg at 10/04/17 1108    Review of Systems   Pertinent items are noted in HPI.      Objective:      /82 (Patient Site: Left Arm, Patient Position: Sitting, Cuff Size: Adult Regular)  Pulse 78  Temp 98.3  F (36.8  C) (Oral)   Resp 24  Ht 5' 6\" (1.676 m)  Wt 201 lb 4.8 oz (91.3 kg)  SpO2 97%  BMI 32.49 kg/m2    General appearance: alert, appears stated age and cooperative  Back: nontender to palpation over lumbar vertebral bodies, mild tenderness with palpation over paraspinous musculature on the right  Lungs: clear to auscultation bilaterally  Heart: regular rate and rhythm, S1, S2 normal, no murmur, click, rub or gallop   Neurologic: straight leg raise is negative        XR lumbar spine: significant degenerative change throughout, curvature of lumbar spine, no compression fractures (my read)       "

## 2021-06-14 NOTE — PROGRESS NOTES
My Clinic Care Coordination Wellness Plan  This Graduation Wellness Plan provides private information in regards to the work I have done with my Care Team from my Primary Care Clinic.  This document provides insight on the goals I have accomplished.  My Care Team congratulates me on my journey to maintain wellness.  This document will help guide me on my journey to maintain a healthy lifestyle.  I will use this to help me overcome any barriers I may encounter.  If I should have any questions or concerns, I will continue to contact the members of my Care Team or contact my Primary Care Clinic for assistance.      [address], [address]  [place]  [address]  [phone]      My Preferred Method of Contact:  Phone: [phone]    My Primary/Preferred Language:  English    Preferred Learning Style:  Unknown    Emergency Contact: Extended Emergency Contact Information  Primary Emergency Contact: Katarzyna Rees  Address: [address] [place] [address] [facility]  Home Phone: [phone]  Relation: Spouse  Secondary Emergency Contact: Damien Rees   [facility]  Home Phone: [phone]  Relation: Child     PCP:  Cristela De Jesus MD  Specialists:    Care Team            Cristela De Jesus MD PCP - General, Family Medicine    [phone]     Jag Li MD Physician, Hematology and Oncology    [phone]     [facility] Care Coordination Care Guide, Clinic Care Coordination    Aura Saxena RN Oncology Nurse Navigator, Hematology and Oncology    Phone: [phone]        Accomplishments:  Goals        Patient Stated      COMPLETED: I would like to lower my blood pressure by taking the medication Dr De Jesus prescribed.  (pt-stated)            Action steps to achieve this goal  1.  I will continue to take my medication as prescribed.   2.  I will continue to follow up with Dr. De Jesus as  needed.     Last updated 8/7/17  Date goal set:  7/26/13        COMPLETED: I would like to not have hip pain so I can get my life back.  (pt-stated)            Action steps to achieve this goal  1.  I will continue to follow up with my ortho MD.  2.  I will do the exercises that ortho recommends to me.   3.  I will talk with my provider regarding any pain I have.     Date goal set:  7/26/13  Date goal updated: 8/7/17            Advanced Directive/Living Will: On file with the clinic    Clinical Emergency Plan  Emergency Plan Recommendation:     When to Use the Emergency Department (ED)  An emergency means you could die if you don t get care quickly. Or you could be hurt permanently (disabled). Read below to know when to use -- and when not to use -- an emergency department (also called ED).     Dangers to your life  Here are examples of emergencies. These need immediate care:  A hard time breathing  Severe chest pain  Choking  Severe bleeding  Suddenly not able to move or speak  Blacking out (fainting)`  Poisoning     Dangers of permanent injuries  Here are other emergencies. These also need immediate care:  Deep cuts or severe burns  Broken bones, or sudden severe pain and swelling in a joint     When it s an emergency  If you have an emergency, follow these steps:     1. Go to the nearest emergency department  If you can, go to the hospital ED closest to you right away.  If you cannot get there right away, or if it is not safe to take yourself, call 911 or your police emergency number.  2. Call your primary care doctor  Tell your doctor about the emergency. Call within 24 hours of going to the ED.  If you cannot call, have someone call for you.  Go to your doctor (not the ED) for any follow-up care.     When it s not an emergency  If a problem is not an emergency, follow these steps:     1. Call your primary care doctor  If you don t know the name of your doctor, call your health plan.  If you cannot call, have  someone call for you.  2. Follow instructions  Your doctor will tell you what you should do.  You may be told to see your doctor right away. You may be told to go to the ED. Or you may be told to go to an urgent care center.  Follow your doctor s advice.       All [facility] clinic patients have access to a Nurse 24 hours a day, 7 days a week.  If you have questions or want advice from a Nurse, please know [facility] is here for you.  You can call your clinic and they will connect you or you can call [facility] at [phone].  [facility] also has Walk In Care clinics in multiple locations.  Call the number listed above for more information about our Walk In Care clinics or visit the [facility] website at [email or web address].

## 2021-06-14 NOTE — PROGRESS NOTES
Updates:   11/15/17- Spoke with pt, everything is going well right now. He will get his BP checked at the [facility]. He will reach out if he needs anything but does not need any assistance/resources at this time. Looks like Cancer Care Navigator has reached out as well. Will send to RN for graduation review.   8/7/17- Scheduled F/U Call: 2nd attempt - Patient returned my call. He is doing well right now besides shooting pain in his neck/back over the weekend. He will see PCP partner in the clinic if needed. He has completed his goals. BP is good right now. We discussed maintenance and he agreed. Will send chart for RN to review and update Emergency Plan   7/31/17-Scheduled F/U Call: 1st attempt

## 2021-06-14 NOTE — PROGRESS NOTES
Pt was seen in clinic today for a BP check and his B-12 injection.     BP was 144/88 Sitting, Blue Cuff after 5 minutes was 140/88 Sitting, Blue Cuff

## 2021-06-15 PROBLEM — I89.0 ACQUIRED LYMPHEDEMA OF LEG: Status: ACTIVE | Noted: 2017-02-15

## 2021-06-15 PROBLEM — I87.009 POSTTHROMBOTIC SYNDROME: Status: ACTIVE | Noted: 2017-02-15

## 2021-06-15 PROBLEM — E55.9 VITAMIN D DEFICIENCY: Status: ACTIVE | Noted: 2017-02-15

## 2021-06-15 PROBLEM — I87.303 VENOUS HYPERTENSION OF BOTH LOWER EXTREMITIES: Status: ACTIVE | Noted: 2017-02-15

## 2021-06-15 PROBLEM — I50.32 CHRONIC DIASTOLIC HEART FAILURE (H): Status: ACTIVE | Noted: 2017-02-23

## 2021-06-15 PROBLEM — I25.10 CAD (CORONARY ARTERY DISEASE): Status: ACTIVE | Noted: 2017-02-10

## 2021-06-15 NOTE — PROGRESS NOTES
[facility] Hematology and Oncology Progress Note    Patient: Christian Rees  MRN: [id number]  Date of Service:         Reason for Visit    Chief Complaint   Patient presents with     HE Cancer     Lung cancer       Assessment and Plan    Metastatic adenocarcinoma of the lung with lymph node and bone metastases diagnosed by biopsy in February 2016  Initial diagnosis of lung cancer with left lower lobectomy in April 2013  Dyspnea on exertion  Hypertension  Anxiety/depression  Low magnesium  Pancytopenia with macrocytic anemia  Vitamin B12 deficiency    CT scans are personally reviewed and show very slight increase in pulmonary nodules in the right lung.  No change in adenopathy or bone lesions.  Patient remains asymptomatic.  At this point I would recommend continued observation.  We will see him back in 2-3 months with repeat lab work and CT scan again.  If there is further increase in size of pulmonary nodules and PET scan and biopsy may need to be considered.  Patient understands and is agreeable to this plan.    Plan: Follow-up in 2-3 months with repeat CT of the chest abdomen and pelvis and lab work    Measurable disease: CT of the chest/PET scan          Current therapy: Observation        Treatment history:   Alimta maintenance, 8 cycles completed in December 2016    Patient has completed 5 cycles of carboplatin and Alimta with dose reduction for recurrent and metastatic lung cancer;  last cycle in June 2016  Left lower lobectomy in April 2013   He then received 4 cycles of chemotherapy as part of the ECOG 1505 clinical trial   He also received maintenance Avastin through December 2013 on the same clinical trial, this was stopped because of hip surgery     Lung cancer    Staging form: Lung, AJCC 7th Edition      Clinical: Stage IV (T1b, N3, M1b) - Signed by Erica Borrero CNP on 3/14/2016    ECOG Performance   ECOG Performance Status: 1    Distress Assessment  Distress Assessment Score: No  distress    Pain           Problem List    1. Lung cancer  CT Chest Abdomen Pelvis Without Oral With IV Contrast        CC: Cristela De Jesus MD    ______________________________________________________________________________    History of Present Illness    Mr. Christian Rees returns for evaluation.  He was seen in October 2017.  He had relapse of his cancer in February 2016.  He was on chemotherapy through December 2016.  Overall is feeling fine with no new symptoms.  No headaches or dizziness.  Some dyspnea on exertion.  No new bone or abdominal pain.  Appetite is good but weight is down some.  ECOG status is 2.  He continues on vitamin B12 injections and folic acid supplements.      Pain Status  Currently in Pain: No/denies    Review of Systems    Constitutional  Constitutional (WDL): Exceptions to WDL  Fatigue: Fatigue relieved by rest (Easily winded. )  Neurosensory  Neurosensory (WDL): Exceptions to WDL  Ataxia: Asymptomatic, clinical or diagnostic observations only, intervention not indicated  Cardiovascular  Cardiovascular (WDL): Exceptions to WDL  Edema: Yes (Lt leg mostly. )  Pulmonary  Respiratory (WDL): Exceptions to WDL  Dyspnea: Shortness of breath with minimal exertion, limiting instrumental ADL  Gastrointestinal  Gastrointestinal (WDL): Exceptions to WDL  Diarrhea: Increase of <4 stools per day over baseline, mild increase in ostomy output compared to baseline (Primary aware. )  Dry Mouth: Symptomatic (e.g., dry or thick saliva) without significant dietary alteration, unstimulated saliva flow >0.2 ml/min  Genitourinary  Genitourinary (WDL): All genitourinary elements are within defined limits  Integumentary  Integumentary (WDL): All integumentary elements are within defined limits  Patient Coping  Patient Coping: Accepting  Distress Assessment  Distress Assessment Score: No distress  Accompanied by  Accompanied by: Alone    Past History  Past Medical History:   Diagnosis Date     Anxiety       Closed Posterior Dislocation Of The Hip     Created by Conversion      Depression      DVT (deep venous thrombosis)      Gout      Heart failure      Hypertension      Lung cancer     Left lung     Persistent Atrial Fibrillation     Created by Conversion [facility] Annotation: Apr 17 2014 11:48AM - Deedee Holbrook: 3/14 new afib  with contr VR with hip replacement [id number] score of  3 with 1  point each for age 65 and older, HTN and CHF hx---new warfarin start          Past Surgical History:   Procedure Laterality Date     LUNG SURGERY       [place] CARDIOVERSION ELECTIVE ARRHYTHMIA EXTERNAL      Description: Elective Cardioversion External;  Recorded: 05/16/2014;  Comments: 4/25/14     [place] REMOVAL OF LUNG,LOBECTOMY      Description: Lung Lobectomy;  Proc Date: 01/01/2013;  Comments: left lower     [place] TOTAL HIP ARTHROPLASTY Bilateral Left (2011), right (2012)     Description: Total Hip Replacement;  Recorded: 01/11/2013;  Comments: right 2003; ; left 2007 x2     SHOULDER SURGERY Left 2009?    rotator cuff repair       Physical Exam    Recent Vitals 1/19/2018   Height -   Weight 194 lbs 13 oz   BSA (m2) -   /76   Pulse 82   Temp 98   Temp src 1   SpO2 95   Some recent data might be hidden       GENERAL: Alert and oriented. Seated comfortably. In no distress.    HEAD: Atraumatic and normocephalic.  Has a full head of hair.    EYES: JULIET, EOMI.  No pallor.  No icterus.    Oral cavity: no mucosal lesion or tonsillar enlargement.    NECK: supple. JVP normal.  No thyroid enlargement.    LYMPH NODES: No palpable, cervical, axillary or inguinal lymphadenopathy.    CHEST: clear to auscultation bilaterally.  Resonant to percussion throughout bilaterally.  Symmetrical breath movements bilaterally.    CVS: S1 and S2 are heard. Regular rate and rhythm.  No murmur or gallop or rub heard.    ABDOMEN: Soft. Not tender. Not distended.  No palpable hepatomegaly or splenomegaly.  No other mass palpable.  Bowel sounds  heard.    EXTREMITIES: Warm.  No edema.    SKIN: no rash, or bruising or purpura.      CNS: Nonfocal        Lab Results    Recent Results (from the past 168 hour(s))   POCT GFR   Result Value Ref Range    POC GFR AMER AF HE >60  >60 mL/min/1.73m2    POC GFR NON AMER AF >60  >60 mL/min/1.73m2   POCT creatinine   Result Value Ref Range    POC Creatinine 1.0 mg/dL       Imaging    Ct Chest Abdomen Pelvis Without Oral With Iv Contrast    Result Date: 1/17/2018  CT CHEST, ABDOMEN, AND PELVIS 1/17/2018 12:26 PM      INDICATION: f/u Lung Cancer TECHNIQUE: CT chest, abdomen, and pelvis. Dose reduction techniques were used. IV CONTRAST: Iohexol (Omni) 100 mL COMPARISON: 10/10/2017, 7/11/2017 FINDINGS: CHEST: Status post left lower lobectomy. No new soft tissue at the suture line. Multiple groundglass and more solid opacities in the right lung have increased in size and number from the comparison study. For example, a solid 8 mm x 6 mm right upper lobe  nodule previously 7 mm x 5 mm and is more confluent. A 7 mm right upper lobe nodule with multiple components is more conspicuous as well (image 34). There is a new anterior right upper lobe nodule measuring 6 mm x 4 mm (image 45). Normal size heart. There is atherosclerotic disease including coronary artery calcification. No thoracic lymphadenopathy. Normal esophagus.  ABDOMEN: A 12 mm hypoattenuating splenic lesion is unchanged (series 4 image 2-32). Normal pancreas. Normal adrenal glands. There is hepatic steatosis. There is cholelithiasis and extrahepatic biliary ductal dilatation. Normal kidneys and ureters. Note that the lower ureters are not well assessed due to streak artifact. Normal stomach. Normal caliber of the small bowel. There is advanced atherosclerotic disease. PELVIS: Streak artifact limits evaluation of the lower pelvis. The urinary bladder is not fully distended. There is colonic diverticulosis without acute diverticulitis. Normal appendix. Prior vasectomy.  MUSCULOSKELETAL: Bilateral total hip arthroplasty. Osteopenia. Degenerative change. Lateral curvature of the lumbar spine. Sclerosis of the T8 vertebral body is new. There is an acute appearing fracture of the posterolateral left seventh rib. Numerous additional healed or healing fractures are noted in the ribs.     CONCLUSION: 1.  Left lower lobectomy. Multiple groundglass and solid nodules in the left lung have slightly increased in size and number, which is concerning for progression of malignancy. An inflammatory or infectious process is still possible. Many of these nodules may be too small for evaluation by PET/CT. Percutaneous biopsy would be difficult at this point. Attention on follow-up imaging. 2.  Incompletely assessed splenic lesion, unchanged in size. 3.  New sclerosis of the T8 vertebral body is nonspecific though interval fracture or metastasis is possible. 4.  There is an acute appearing fracture of the posterolateral left seventh rib. Numerous additional healed or healing fractures are noted in the ribs. 5.  Advanced atherosclerotic disease. 6.  Cholelithiasis and extrahepatic biliary dilatation. NOTE: ABNORMAL REPORT THE DICTATION ABOVE DESCRIBES AN ABNORMALITY FOR WHICH FOLLOW-UP IS NEEDED.         Signed by: Jag Li MD

## 2021-06-15 NOTE — PROGRESS NOTES
B12 injection given.  Pt also inquired about inflammation in left hand x 1 day. No known injury. He will make an appt to have check out.

## 2021-06-15 NOTE — PROGRESS NOTES
Assessment/Plan:       1. Generalized anxiety disorder  Anxiety has been fairly poorly controlled for quite some time, but patient has been resistant to changes in his Paxil.  Recommended cutting this in half for the next month with plan to change over to Lexapro in 1 month.  While cutting back, added clonazepam that he can take twice daily for anxiety.  Follow up in 1 month.  - PARoxetine (PAXIL) 40 MG tablet; Take 0.5 tablets (20 mg total) by mouth every morning.  Dispense: 90 tablet; Refill: 3    2. Essential hypertension  Poorly controlled on current regimen.  Increased carvedilol to 25 mg twice daily.  Plan recheck in 1 month.    3. Shortness of breath  Recommended formal PFTs and follow up with pulmonology.  Likely has an element of COPD with his smoking history.  - PFT Complete; Future  - Ambulatory referral to Pulmonology    4. Chronic diastolic heart failure  - carvedilol (COREG) 25 MG tablet; Take 1 tablet (25 mg total) by mouth 2 (two) times a day with meals.  Dispense: 90 tablet; Refill: 3    5. Mixed hyperlipidemia  LDL was quite low on today's labs, recommended decrease in Lipitor to 20 mg daily.  - Lipid Cascade RANDOM  - atorvastatin (LIPITOR) 40 MG tablet; Take 0.5 tablets (20 mg total) by mouth at bedtime.  Dispense: 90 tablet; Refill: 2        Subjective:       Christian Rees is a 67 y.o. male who presents for general follow-up.  He says that he has been feeling more anxious lately, especially about his lung cancer.  He says that he was under the impression that he was cancer-free, however a new CT scan shows several lesions in his lungs concerning for progression of his cancer.  He has been off chemotherapy for quite some time.  He follows closely with [facility] and they plan to recheck these lesions with a short-term repeat CT.  Patient reports that his anxiety manifests with feeling short of breath.  He feels that he needs to get away from crowded spaces, needs to take a  walk or go outside.  This limits his ability to be social.  He often feels anxious, has trouble relaxing and a feeling of doom.      Labs Reviewed:  Lab Results   Component Value Date    WBC 4.3 10/12/2017    HGB 10.5 (L) 10/12/2017    HCT 30.9 (L) 10/12/2017     (L) 10/12/2017    ALT 27 07/13/2017    AST 43 (H) 07/13/2017     07/13/2017    K 4.0 07/13/2017     07/13/2017    CREATININE 0.91 07/13/2017    BUN 13 07/13/2017    CO2 29 07/13/2017    TSH 1.29 07/25/2014    PSA 0.7 02/22/2016    INR 1.10 01/24/2017       The following portions of the patient's history were reviewed and updated as appropriate: allergies, current medications, past medical history, past social history and problem list.      Current Outpatient Prescriptions:      aspirin 81 MG EC tablet, Take 1 tablet (81 mg total) by mouth daily. Pt taking every other day or as tolerated, Disp: 150 tablet, Rfl: 2     atorvastatin (LIPITOR) 40 MG tablet, TAKE ONE TABLET BY MOUTH EVERY DAY, Disp: 90 tablet, Rfl: 2     carvedilol (COREG) 12.5 MG tablet, Take 1 tablet (12.5 mg total) by mouth 2 (two) times a day with meals., Disp: 180 tablet, Rfl: 3     folic acid (FOLVITE) 1 MG tablet, TAKE ONE TABLET BY MOUTH EVERY DAY, Disp: 100 tablet, Rfl: 3     furosemide (LASIX) 40 MG tablet, TAKE ONE TABLET BY MOUTH EVERY DAY, Disp: 90 tablet, Rfl: 3     indomethacin (INDOCIN) 50 MG capsule, TAKE ONE CAPSULE BY MOUTH THREE TIMES A DAY WITH FOOD AS NEEDED, Disp: 30 capsule, Rfl: 2     IPRATROPIUM/ALBUTEROL SULFATE (IPRATROPIUM-ALBUTEROL INHL), Inhale 2 puffs 3 (three) times a day as needed. , Disp: , Rfl:      lisinopril (PRINIVIL,ZESTRIL) 20 MG tablet, Take 1 tablet (20 mg total) by mouth 2 (two) times a day., Disp: 180 tablet, Rfl: 3     magnesium oxide (MAGOX) 400 mg tablet, 400mg daily, Disp: 90 tablet, Rfl: 3     multivitamin (MULTIVITAMIN) per tablet, Take 1 tablet by mouth daily., Disp: , Rfl:      omeprazole (PRILOSEC) 20 MG capsule, Take 20 mg  "by mouth bedtime., Disp: , Rfl:      oxyCODONE-acetaminophen (PERCOCET) 5-325 mg per tablet, Take 1 tablet by mouth every 8 (eight) hours as needed for pain., Disp: 60 tablet, Rfl: 0     PARoxetine (PAXIL) 40 MG tablet, TAKE ONE TABLET BY MOUTH EVERY DAY, Disp: 90 tablet, Rfl: 3     potassium chloride SA (K-DUR,KLOR-CON) 20 MEQ tablet, TAKE ONE TABLET BY MOUTH EVERY DAY, Disp: 30 tablet, Rfl: 6    Current Facility-Administered Medications:      cyanocobalamin injection 1,000 mcg, 1,000 mcg, Intramuscular, Q30 Days, Cristela De Jesus MD, 1,000 mcg at 02/06/18 1041    Review of Systems   A 12 point comprehensive review of systems was negative except as noted.      Objective:      BP (!) 188/94 (Patient Site: Right Arm, Patient Position: Sitting, Cuff Size: Adult Regular)  Pulse 75  Temp 98.1  F (36.7  C) (Oral)   Resp 24  Ht 5' 6\" (1.676 m)  Wt 193 lb 6.4 oz (87.7 kg)  SpO2 96%  BMI 31.22 kg/m2    General appearance: alert, appears stated age and cooperative  Head: Normocephalic, without obvious abnormality, atraumatic  Eyes: conjunctivae clear, sclerae anicteric  Lungs: clear to auscultation bilaterally and diminished breath sounds throughout, no wheeze  Heart: regular rate and rhythm, S1, S2 normal, no murmur, click, rub or gallop  Extremities: 1+ pitting edema bilateral ankles  Neurologic: Grossly normal with the exception of a fine tremor  Psychiatric: speech is fluent and thought process is linear, mood is described as anxious, affect congruent    Results for orders placed or performed in visit on 02/07/18   Lipid Cascade RANDOM   Result Value Ref Range    Cholesterol 152 <=199 mg/dL    Triglycerides 234 (H) <=149 mg/dL    HDL Cholesterol 77 >=40 mg/dL    LDL Calculated 28 <=129 mg/dL    Patient Fasting > 8hrs? No        I spent a total of 40 minutes with the patient today, >50% in face-to-face counseling and coordination of care in relation to the above issues.          "

## 2021-06-16 PROBLEM — G47.33 OBSTRUCTIVE SLEEP APNEA SYNDROME: Status: ACTIVE | Noted: 2018-01-01

## 2021-06-16 PROBLEM — R79.89 ELEVATED TROPONIN: Status: ACTIVE | Noted: 2018-01-01

## 2021-06-16 PROBLEM — R74.8 ELEVATED LIVER ENZYMES: Status: ACTIVE | Noted: 2018-01-01

## 2021-06-16 PROBLEM — M54.50 CHRONIC MIDLINE LOW BACK PAIN WITHOUT SCIATICA: Status: ACTIVE | Noted: 2018-01-01

## 2021-06-16 PROBLEM — Z51.11 ENCOUNTER FOR ANTINEOPLASTIC CHEMOTHERAPY: Status: ACTIVE | Noted: 2018-01-01

## 2021-06-16 PROBLEM — D61.818 PANCYTOPENIA (H): Status: ACTIVE | Noted: 2018-01-01

## 2021-06-16 PROBLEM — D53.9 MACROCYTIC ANEMIA: Status: ACTIVE | Noted: 2018-01-01

## 2021-06-16 PROBLEM — Z79.899 CONTROLLED SUBSTANCE AGREEMENT SIGNED: Status: ACTIVE | Noted: 2017-01-01

## 2021-06-16 PROBLEM — J96.11 CHRONIC RESPIRATORY FAILURE WITH HYPOXIA (H): Status: ACTIVE | Noted: 2018-01-01

## 2021-06-16 PROBLEM — I48.0 PAF (PAROXYSMAL ATRIAL FIBRILLATION) (H): Status: ACTIVE | Noted: 2018-01-01

## 2021-06-16 PROBLEM — G89.29 CHRONIC MIDLINE LOW BACK PAIN WITHOUT SCIATICA: Status: ACTIVE | Noted: 2018-01-01

## 2021-06-16 PROBLEM — D64.9 ANEMIA: Status: ACTIVE | Noted: 2018-01-01

## 2021-06-16 PROBLEM — C34.90: Chronic | Status: ACTIVE | Noted: 2018-01-01

## 2021-06-16 NOTE — PROGRESS NOTES
Patient instructed in use of Breo ellipta inhaler.  Patient states good understanding of how to use the ellipta device. Printed instructions and phone numbers to call with questions sent home with patient.  Patient also instructed to rinse mouth after each use.

## 2021-06-16 NOTE — PROGRESS NOTES
Assessment/Plan:       1. Pain in joint of right shoulder  After discussion of risks and benefits, patient wishes to have a steroid injection to the right shoulder.  After cleansing the area with Betadine, via a posterior approach, injected 1 mL or 40 mg of Depo-Medrol and 4 mL of lidocaine into the right glenohumeral joint.  Patient tolerated the procedure well and there were no immediate complications.  He can follow-up as needed.  - lidocaine 10 mg/mL (1 %) injection 4 mL; Inject 4 mL into the joint once.  - methylPREDNISolone acetate injection 40 mg (DEPO-MEDROL); Inject 1 mL (40 mg total) into the joint once.    2. Mixed hyperlipidemia  Recommended decreasing Lipitor to 20 mg daily.  - atorvastatin (LIPITOR) 20 MG tablet; Take 1 tablet (20 mg total) by mouth at bedtime.  Dispense: 90 tablet; Refill: 3    3. Generalized anxiety disorder  Doing reasonably well decreasing paroxetine.  Will now switch over to fluoxetine, also preferred by patient's insurance.  He has been taking clonazepam twice daily during this transition.  Recommended seeing him back in 4 weeks to reassess, possibly increase fluoxetine dose, and come off clonazepam.    4. Shortness of breath  Suspect COPD.  He breathes with pursed lips during exhalation.  He has an appointment set up with pulmonology and for formal PFTs.    5. Excessive Sweating  This is improving with transition off of paroxetine.        Subjective:       Christian Rees is a 67 y.o. male who presents for follow-up of his mood and for a right shoulder injection.  He has severe degenerative changes of both shoulders along with pain and restricted motion.  He has utilized injections to the shoulders for many years with good relief of his pain.  He has been offered additional surgery, but his prognosis is poor.  He desires an injection of the right shoulder.  This was last performed in October 2017.  Next, we are transitioning off of fluoxetine and onto another SSRI for his  generalized anxiety disorder.  He noted an increase in his anxiety on paroxetine, had been on this for many years.  He also noted excessive sweating.  Initially we were tapering him down and using clonazepam twice daily.  He feels reasonably well on this regimen, continues with some anxiety but has not noted a worsening of his mood.  His insurance prefers fluoxetine or sertraline.  He remains with episodic shortness of breath.  At his last visit, we had arranged formal pulmonary function tests and a visit with pulmonology, both of which are upcoming in the near future.    The following portions of the patient's history were reviewed and updated as appropriate: allergies, current medications, past medical history, past social history and problem list.      Current Outpatient Prescriptions:      aspirin 81 MG EC tablet, Take 1 tablet (81 mg total) by mouth daily. Pt taking every other day or as tolerated, Disp: 150 tablet, Rfl: 2     atorvastatin (LIPITOR) 40 MG tablet, TAKE ONE TABLET BY MOUTH EVERY DAY, Disp: 90 tablet, Rfl: 3     carvedilol (COREG) 25 MG tablet, Take 1 tablet (25 mg total) by mouth 2 (two) times a day with meals., Disp: 90 tablet, Rfl: 3     clonazePAM (KLONOPIN) 0.5 MG tablet, Take 1 tablet (0.5 mg total) by mouth 2 (two) times a day., Disp: 60 tablet, Rfl: 0     folic acid (FOLVITE) 1 MG tablet, TAKE ONE TABLET BY MOUTH EVERY DAY, Disp: 100 tablet, Rfl: 3     furosemide (LASIX) 40 MG tablet, TAKE ONE TABLET BY MOUTH EVERY DAY, Disp: 90 tablet, Rfl: 3     indomethacin (INDOCIN) 50 MG capsule, TAKE ONE CAPSULE BY MOUTH THREE TIMES A DAY WITH FOOD AS NEEDED, Disp: 30 capsule, Rfl: 2     IPRATROPIUM/ALBUTEROL SULFATE (IPRATROPIUM-ALBUTEROL INHL), Inhale 2 puffs 3 (three) times a day as needed. , Disp: , Rfl:      lisinopril (PRINIVIL,ZESTRIL) 20 MG tablet, Take 1 tablet (20 mg total) by mouth 2 (two) times a day., Disp: 180 tablet, Rfl: 3     magnesium oxide (MAGOX) 400 mg tablet, 400mg daily, Disp:  "90 tablet, Rfl: 3     multivitamin (MULTIVITAMIN) per tablet, Take 1 tablet by mouth daily., Disp: , Rfl:      omeprazole (PRILOSEC) 20 MG capsule, Take 20 mg by mouth bedtime., Disp: , Rfl:      oxyCODONE-acetaminophen (PERCOCET) 5-325 mg per tablet, Take 1 tablet by mouth every 8 (eight) hours as needed for pain., Disp: 60 tablet, Rfl: 0     PARoxetine (PAXIL) 40 MG tablet, Take 0.5 tablets (20 mg total) by mouth every morning., Disp: 90 tablet, Rfl: 3     potassium chloride SA (K-DUR,KLOR-CON) 20 MEQ tablet, TAKE ONE TABLET BY MOUTH EVERY DAY, Disp: 30 tablet, Rfl: 6    Current Facility-Administered Medications:      cyanocobalamin injection 1,000 mcg, 1,000 mcg, Intramuscular, Q30 Days, Cristela De Jesus MD, 1,000 mcg at 03/06/18 1015    Review of Systems   Pertinent items are noted in HPI.      Objective:      /58 (Patient Site: Right Arm, Patient Position: Sitting, Cuff Size: Adult Regular)  Pulse 60  Resp 24  Ht 5' 6\" (1.676 m)  Wt 196 lb 9.6 oz (89.2 kg)  BMI 31.73 kg/m2    General appearance: alert, appears stated age and cooperative  Head: Normocephalic, without obvious abnormality, atraumatic  Eyes: Conjunctivae clear, sclerae anicteric  Lungs: Diminished breath sounds throughout, no wheeze  Heart: regular rate and rhythm, S1, S2 normal, no murmur, click, rub or gallop  Psychiatric: Speech is fluent and thought processes linear, affect is very mildly anxious but reactive, mood is described as mildly anxious          "

## 2021-06-16 NOTE — PROGRESS NOTES
CCx:Establishment of care for SOB    HPI:Mr. 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  He follows in the oncology clinic with Dr. Li and is presently not receiving any chemotherapy as his cancer has not progressed and is instead undergoing serial imaging studies.  He presents to clinic today telling me that he has been experiencing worsening shortness of breath with minimal activity such as walking to the mailbox, making the bed or vacuuming in the house.  He is not short of breath when he showers, when he eats or when he is reaching above his head to get something out of a cabinet above him.  He denies nighttime awakenings (which he ascribes to his CPAP compliance) and endorses a 15 pound intentional weight loss.  He is only using an as needed albuterol inhaler for rescue which he feels awards and some relief of his shortness of breath.    ROS:  Pertinent positives alluded to in the HPI.  Remainder of 10 point review of systems is negative.    PMH:  1. Anxiety  2. Depression  3. Gout  4. Heart failure  5. Hypertension  6. DVT  7. EKTA  8. Persistent atrial fibrillation  9. Prior adenocarcinoma of the lung diagnosed in 2013 status post left lower lobe lobectomy with adjuvant chemotherapy    PSH:  1. Left lower lobe lobectomy 13  2. Status post bilateral ALEXIA  3. Status post left shoulder arthroplasty    Allergies:  Reviewed in Epic.    Family HX:  1. Mother:  at the age of 47 from a metastatic breast cancer  2. Father: Likely  from a lower GI bleed  3. Brother:  at the age of 10yrs from complications of bulbar polio    Social Hx:  Marital status:   Number of children:  1  Resides in a [facility], no concern for mold.  Occupational history: Retired  for a company called [employer]   service: No  Pets: No  Smoking history: 50 pack year history; quit in 2013  Alcohol use: Couple of glasses of wine a night   Recreational drug use: No  Hobbies: Used to enjoy scuba diving which he no longer does; avidly follows [employer]  Recent Travel: No    Current Meds:  Current Outpatient Prescriptions   Medication Sig Dispense Refill     albuterol (PROAIR HFA;PROVENTIL HFA;VENTOLIN HFA) 90 mcg/actuation inhaler Inhale 2 puffs every 6 (six) hours as needed for wheezing. 1 each 3     aspirin 81 MG EC tablet Take 1 tablet (81 mg total) by mouth daily. Pt taking every other day or as tolerated 150 tablet 2     atorvastatin (LIPITOR) 20 MG tablet Take 1 tablet (20 mg total) by mouth at bedtime. 90 tablet 3     carvedilol (COREG) 25 MG tablet Take 1 tablet (25 mg total) by mouth 2 (two) times a day with meals. 90 tablet 3     clonazePAM (KLONOPIN) 0.5 MG tablet Take 1 tablet (0.5 mg total) by mouth 2 (two) times a day. 60 tablet 0     FLUoxetine (PROZAC) 20 MG capsule Take 1 capsule (20 mg total) by mouth daily. 90 capsule 3     folic acid (FOLVITE) 1 MG tablet TAKE ONE TABLET BY MOUTH EVERY  tablet 3     furosemide (LASIX) 40 MG tablet TAKE ONE TABLET BY MOUTH EVERY DAY 90 tablet 3     indomethacin (INDOCIN) 50 MG capsule TAKE ONE CAPSULE BY MOUTH THREE TIMES A DAY WITH FOOD AS NEEDED 30 capsule 2     lisinopril (PRINIVIL,ZESTRIL) 20 MG tablet Take 1 tablet (20 mg total) by mouth 2 (two) times a day. 180 tablet 3     magnesium oxide (MAGOX) 400 mg tablet 400mg daily 90 tablet 3     multivitamin (MULTIVITAMIN) per tablet Take 1 tablet by mouth daily.       omeprazole (PRILOSEC) 20 MG capsule Take 20 mg by mouth bedtime.       oxyCODONE-acetaminophen (PERCOCET) 5-325 mg per tablet Take 1 tablet by mouth every 8 (eight) hours as needed for pain. 60 tablet 0      potassium chloride SA (K-DUR,KLOR-CON) 20 MEQ tablet TAKE ONE TABLET BY MOUTH EVERY DAY 30 tablet 6     fluticasone-vilanterol (BREO ELLIPTA) 200-25 mcg/dose DsDv inhaler Inhale 1 puff daily. 1 each 12     Current Facility-Administered Medications   Medication Dose Route Frequency Provider Last Rate Last Dose     cyanocobalamin injection 1,000 mcg  1,000 mcg Intramuscular Q30 Days Cristela De Jesus MD   1,000 mcg at 03/06/18 1015     Labs:  Reviewed in Epic.    Imaging studies:  CT CAP 1/18/18:  1.  Left lower lobectomy. Multiple groundglass and solid nodules in the left lung have slightly increased in size and number, which is concerning for progression of malignancy. An inflammatory or infectious process is still possible. Many of these nodules may be too small for evaluation by PET/CT. Percutaneous biopsy would be difficult at this point. Attention on follow-up imaging.  2.  Incompletely assessed splenic lesion, unchanged in size.  3.  New sclerosis of the T8 vertebral body is nonspecific though interval fracture or metastasis is possible.  4.  There is an acute appearing fracture of the posterolateral left seventh rib. Numerous additional healed or healing fractures are noted in the ribs.  5.  Advanced atherosclerotic disease.  6.  Cholelithiasis and extrahepatic biliary dilatation.    PFT's 3/20/18:  FEV1/FVC is 58% and is reduced.  FEV1 is 1.24L (43%) predicted and is reduced.  FVC is 2.14L (58%) predicted and reduced.  There was no improvement in spirometry after a single inhaled dose of bronchodilator.  TLC is 6.27L (99%) predicted and is normal.  RV is 3.86L (154%) predicted and is increased.  DLCO is 13.38ml/min/hg (54%) predicted and is reduced when it is corrected for hemoglobin.  Flow volume loops indicate mild scooping in the expiratory limb.    Impression:  Full Pulmonary Function Test is abnormal.  PFTs are consistent with severe obstructive disease.  Spirometry is not consistent with  "reversibility.  There is no hyperinflation.  There is air-trapping.  Diffusion capacity when corrected for hemoglobin is moderately reduced.    Physical Exam:  /76  Pulse 72  Resp 20  Ht 5' 6\" (1.676 m)  Wt 190 lb 9.6 oz (86.5 kg)  SpO2 97% Comment: RA  BMI 30.76 kg/m2  Heart Rate: 72  Resp: 20  BP: 114/76  SpO2: 97 % (RA)  Height: 5' 6\" (1.676 m)  Weight: 190 lb 9.6 oz (86.5 kg)  Physical Exam   Constitutional: He is oriented to person, place, and time. He appears well-developed and well-nourished. No distress.   HENT:   Head: Normocephalic.   Eyes: Pupils are equal, round, and reactive to light.   Neck: Normal range of motion.   Cardiovascular: Normal rate.    Irregularly irregular.   Pulmonary/Chest: Effort normal and breath sounds normal.   Abdominal: Soft.   Musculoskeletal: Normal range of motion.   Neurological: He is alert and oriented to person, place, and time.   Skin: Skin is warm. He is not diaphoretic.   Psychiatric: He has a normal mood and affect.         Assessment and Plan:Christian Rees is a 67 y.o. 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  His pulmonary function tests indicate severe obstructive lung disease with air trapping and a moderate reduction in his diffusion capacity.  Unfortunately I do not have his prior lobectomy pulmonary function testing and had and so I am not able to speak to a decline in his lung function testing.  The severity of his obstruction would certainly account for his symptom complex he is presently undertreated for the latter.  For the present we would recommend;    1. Severe obstructive lung disease: Has proven to have " severe obstruction on his lung function tests and is presently only using an as needed bronchodilator.  He is fairly functional and I suspect that he may respond well to long-acting bronchodilators.    We will initiate fluticasone/Vilanterol 1 puff daily.  Patient has been instructed on how to use this and reminded to rinse his mouth out after he uses this.    He has also been reminded to use as needed albuterol for rescue and at his next clinic visit we will determine the need to further escalate therapy.    The patient is fairly functional presently but at his next clinic visit we will address pulmonary rehabilitation with him.    We will obtain a 6MWT to determine the need for supplemental oxygen given his moderate reduction in diffusion capacity.  2. Recurrent metastatic lung cancer: Is presently being expectantly managed by the oncology service.  3. [facility]: Is up-to-date with his influenza and pneumococcal vaccines.  4. Follow-up: 4 weeks.      Nadine Blue  Pulmonary and Critical Care  [id number]

## 2021-06-17 NOTE — PROGRESS NOTES
[facility] Hematology and Oncology Progress Note    Patient: Christian Rees  MRN: [id number]  Date of Service:         Reason for Visit    Chief Complaint   Patient presents with     HE Cancer     Lung Cancer       Assessment and Plan    Metastatic adenocarcinoma of the lung with lymph node and bone metastases diagnosed by biopsy in February 2016  Initial diagnosis of lung cancer with left lower lobectomy in April 2013  Dyspnea on exertion  Hypertension  Anxiety/depression  Low magnesium  Pancytopenia with macrocytic anemia  Vitamin B12 deficiency    CT scans are reviewed and they are stable.  Note of pulmonary nodules in the right lung but there are no new ones.  No clinical evidence either of any progression.  Reviewed with patient and his wife.  We will continue observation.  We will see him again in 3 months with repeat scans and lab work.    Plan: Follow-up in3 months with repeat CT of the chest abdomen and pelvis and lab work    Measurable disease: CT of the chest/PET scan          Current therapy: Observation        Treatment history:   Alimta maintenance, 8 cycles completed in December 2016    Patient has completed 5 cycles of carboplatin and Alimta with dose reduction for recurrent and metastatic lung cancer;  last cycle in June 2016  Left lower lobectomy in April 2013   He then received 4 cycles of chemotherapy as part of the ECOG 1505 clinical trial   He also received maintenance Avastin through December 2013 on the same clinical trial, this was stopped because of hip surgery     Lung cancer    Staging form: Lung, AJCC 7th Edition      Clinical: Stage IV (T1b, N3, M1b) - Signed by Erica Borrero CNP on 3/14/2016    ECOG Performance   ECOG Performance Status: 1    Distress Assessment  Distress Assessment Score: 1    Pain           Problem List    1. Malignant neoplasm of lower lobe of left lung  potassium chloride SA (K-DUR,KLOR-CON) 20 MEQ tablet    CT Chest Abdomen Pelvis Without Oral With  IV Contrast    HM1(CBC and Differential)    Comprehensive Metabolic Panel   2. Lung cancer          CC: Cristela De Jesus MD    ______________________________________________________________________________    History of Present Illness    Mr. Christian Rees returns for evaluation.  He was seen back in January.  Has seen pulmonary and is on treatment now for COPD.  Denies any new headaches.  No new cough or chest wall pain.  He did have a fall again recently.  No new abdominal or bone pain.  Appetite and weight are stable.  ECOG status is 2.  No other new issues.    He was diagnosed with recurrent lung cancer in February 2016.  He was on chemotherapy till December 2016.  Has been on observation after that.  Pain Status  Currently in Pain: No/denies    Review of Systems    Constitutional  Constitutional (WDL): Exceptions to WDL  Fatigue: Fatigue relieved by rest (at times)  Neurosensory  Neurosensory (WDL): Exceptions to WDL  Cardiovascular  Cardiovascular (WDL): All cardiovascular elements are within defined limits  Pulmonary  Respiratory (WDL): Exceptions to WDL  Dyspnea: Shortness of breath with minimal exertion, limiting instrumental ADL  Gastrointestinal  Gastrointestinal (WDL): All gastrointestinal elements are within defined limits  Genitourinary  Genitourinary (WDL): All genitourinary elements are within defined limits  Integumentary  Integumentary (WDL): Exceptions to WDL (bruises easily)  Patient Coping  Patient Coping: Accepting  Distress Assessment  Distress Assessment Score: 1  Accompanied by  Accompanied by: Family Member    Past History  Past Medical History:   Diagnosis Date     Anxiety      Closed Posterior Dislocation Of The Hip     Created by Conversion      COPD (chronic obstructive pulmonary disease) 03/20/2018     Depression      DVT (deep venous thrombosis)      Gout      Heart failure      Hypertension      Lung cancer     Left lung     Persistent Atrial Fibrillation     Created by  [facility] Annotation: Apr 17 2014 11:48AM - Deedee Holbrook: 3/14 new afib  with contr VR with hip replacement [id number] score of  3 with 1  point each for age 65 and older, HTN and CHF hx---new warfarin start          Past Surgical History:   Procedure Laterality Date     LUNG SURGERY       SD CARDIOVERSION ELECTIVE ARRHYTHMIA EXTERNAL      Description: Elective Cardioversion External;  Recorded: 05/16/2014;  Comments: 4/25/14     SD REMOVAL OF LUNG,LOBECTOMY      Description: Lung Lobectomy;  Proc Date: 01/01/2013;  Comments: left lower     SD TOTAL HIP ARTHROPLASTY Bilateral Left (2011), right (2012)     Description: Total Hip Replacement;  Recorded: 01/11/2013;  Comments: right 2003; ; left 2007 x2     SHOULDER SURGERY Left 2009?    rotator cuff repair       Physical Exam    Recent Vitals 3/30/2018   Height -   Weight 193 lbs 10 oz   BSA (m2) -   /60   Pulse 59   Temp 97.6   Temp src 1   SpO2 97   Some recent data might be hidden       GENERAL: Alert and oriented. Seated comfortably. In no distress.    HEAD: Atraumatic and normocephalic.  Has a full head of hair.    EYES: JULIET, EOMI.  No pallor.  No icterus.    Oral cavity: no mucosal lesion or tonsillar enlargement.    NECK: supple. JVP normal.  No thyroid enlargement.    LYMPH NODES: No palpable, cervical, axillary or inguinal lymphadenopathy.    CHEST: clear to auscultation bilaterally.  Resonant to percussion throughout bilaterally.  Symmetrical breath movements bilaterally.    CVS: S1 and S2 are heard. Regular rate and rhythm.  No murmur or gallop or rub heard.    ABDOMEN: Soft. Not tender. Not distended.  No palpable hepatomegaly or splenomegaly.  No other mass palpable.  Bowel sounds heard.    EXTREMITIES: Warm.  No edema.    SKIN: no rash, or bruising or purpura.      CNS: Nonfocal        Lab Results    Recent Results (from the past 168 hour(s))   POCT creatinine   Result Value Ref Range    POC Creatinine 0.9 mg/dL   POCT  GFR   Result Value Ref Range    POC GFR AMER AF HE >60  >60 mL/min/1.73m2    POC GFR NON AMER AF >60  >60 mL/min/1.73m2   Comprehensive Metabolic Panel   Result Value Ref Range    Sodium 142 136 - 145 mmol/L    Potassium 3.9 3.5 - 5.0 mmol/L    Chloride 101 98 - 107 mmol/L    CO2 28 22 - 31 mmol/L    Anion Gap, Calculation 13 5 - 18 mmol/L    Glucose 104 70 - 125 mg/dL    BUN 17 8 - 22 mg/dL    Creatinine 0.83 0.70 - 1.30 mg/dL    GFR MDRD Af Amer >60 >60 mL/min/1.73m2    GFR MDRD Non Af Amer >60 >60 mL/min/1.73m2    Bilirubin, Total 0.8 0.0 - 1.0 mg/dL    Calcium 8.6 8.5 - 10.5 mg/dL    Protein, Total 5.8 (L) 6.0 - 8.0 g/dL    Albumin 2.6 (L) 3.5 - 5.0 g/dL    Alkaline Phosphatase 233 (H) 45 - 120 U/L    AST 57 (H) 0 - 40 U/L    ALT 37 0 - 45 U/L   Magnesium   Result Value Ref Range    Magnesium 1.2 (L) 1.8 - 2.6 mg/dL   HM1 (CBC with Diff)   Result Value Ref Range    WBC 2.8 (L) 4.0 - 11.0 thou/uL    RBC 3.04 (L) 4.40 - 6.20 mill/uL    Hemoglobin 10.5 (L) 14.0 - 18.0 g/dL    Hematocrit 31.5 (L) 40.0 - 54.0 %     (H) 80 - 100 fL    MCH 34.5 (H) 27.0 - 34.0 pg    MCHC 33.3 32.0 - 36.0 g/dL    RDW 13.0 11.0 - 14.5 %    Platelets 109 (L) 140 - 440 thou/uL    MPV 9.5 8.5 - 12.5 fL    Neutrophils % 59 50 - 70 %    Lymphocytes % 28 20 - 40 %    Monocytes % 12 (H) 2 - 10 %    Eosinophils % 1 0 - 6 %    Basophils % 0 0 - 2 %    Neutrophils Absolute 1.7 (L) 2.0 - 7.7 thou/uL    Lymphocytes Absolute 0.8 0.8 - 4.4 thou/uL    Monocytes Absolute 0.3 0.0 - 0.9 thou/uL    Eosinophils Absolute 0.0 0.0 - 0.4 thou/uL    Basophils Absolute 0.0 0.0 - 0.2 thou/uL       Imaging    Ct Chest Abdomen Pelvis Without Oral With Iv Contrast    Result Date: 3/28/2018  CT CHEST, ABDOMEN, AND PELVIS 3/28/2018 11:31 AM      INDICATION: fu lung cancer TECHNIQUE: CT chest, abdomen, and pelvis. Dose reduction techniques were used. IV CONTRAST: Iohexol (Omni) 100 mL COMPARISON: CT chest, abdomen and pelvis 01/17/2018 FINDINGS: CHEST: Postop  changes from left lower lobectomy. Small left pleural effusion is unchanged. Numerous small nodules throughout the right upper and lower lobes, with 2 of the largest nodes measuring 8 mm on image #33 and 6 mm on image 45 all with minimal change. No new nodules. There is a new focal area of interstitial infiltrate within right lower lobe adjacent to the hilum. No enlarged mediastinal or hilar nodes. Atherosclerotic disease thoracic aorta. Coronary artery calcifications.  ABDOMEN: Diffuse fatty infiltration of the liver. Small splenic hypodense lesions are unchanged. The pancreas, adrenal glands, and kidneys are normal. Gallstones. No adenopathy. Slight dilatation of the common bile duct is unchanged. Atherosclerotic disease of the aorta with extensive plaque at the origin of the superior mesenteric artery. PELVIS: Scattered diverticula left colon. There is a short segment of wall thickening mid sigmoid colon on images 86 through 96, unchanged. No adenopathy or ascites. MUSCULOSKELETAL: Bilateral total hip arthroplasties. Pathologic fracture involving the sixth rib has progressed with slightly greater soft tissue component along the pleura. Multiple old left-sided rib fractures     CONCLUSION: 1.  Stable postop changes left lower lobectomy. Left pleural effusion is unchanged. 2.  Numerous nodules throughout the right upper and lower lobes with minimal change. 3.  Interstitial opacity within the right lower lobe adjacent to the hilum is new and could represent focal area of pneumonitis. 4.  Pathologic right sixth rib fracture has progressed. 5.  No evidence for metastatic disease within the abdomen or pelvis. 6.  Short segment of wall thickening sigmoid colon, indeterminate. Neoplasm not excluded. This should be reviewed on follow-up imaging. Correlation should also made with previous colonoscopy if available. 7.  Advanced atherosclerotic disease.        Signed by: Jag Li MD

## 2021-06-17 NOTE — PROGRESS NOTES
CCx: Follow-up COPD    HPI:Mr. 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  He states that initiation of Breo has helped his symptoms somewhat but he continues to use his rescue inhaler a couple of times a day.  He continues to have some shortness of breath when he walks to the mailbox but is not waking up at night with shortness of breath although he continues to be compliant with his CPAP therapy and thinks that this is why he has no shortness of breath at night.    Pertinent Medical History:   He follows in the oncology clinic with Dr. Li and is presently not receiving any chemotherapy as his cancer has not progressed and is instead undergoing serial imaging studies.    ROS:  Pertinent positives alluded to in the HPI.  Remainder of 10 point review of systems is negative.    PMH (unchanged from prior visit):  1. Anxiety  2. Depression  3. Gout  4. Heart failure  5. Hypertension  6. DVT  7. EKTA  8. Persistent atrial fibrillation  9. Prior adenocarcinoma of the lung diagnosed in 2013 status post left lower lobe lobectomy with adjuvant chemotherapy    PSH  (unchanged from prior visit):  1. Left lower lobe lobectomy 13  2. Status post bilateral ALEXIA  3. Status post left shoulder arthroplasty    Allergies (unchanged from prior visit):  Reviewed in Epic.    Family HX (unchanged from prior visit):  1. Mother:  at the age of 47 from a metastatic breast cancer  2. Father: Likely  from a lower GI bleed  3. Brother:  at the age of 10yrs from complications of bulbar polio    Social Hx (unchanged from prior visit):  Marital status:   Number of children: 1  Resides in a [facility],   concern for mold.  Occupational history: Retired  for a company called [employer]   service: No  Pets: No  Smoking history: 50 pack year history; quit in 2013  Alcohol use: Couple of glasses of wine a night   Recreational drug use: No  Hobbies: Used to enjoy scuba diving which he no longer does; avidly follows [employer] racing  Recent Travel: No    Current Meds:  Current Outpatient Prescriptions   Medication Sig Dispense Refill     albuterol (PROAIR HFA;PROVENTIL HFA;VENTOLIN HFA) 90 mcg/actuation inhaler Inhale 2 puffs every 6 (six) hours as needed for wheezing. 1 each 3     aspirin 81 MG EC tablet Take 1 tablet (81 mg total) by mouth daily. Pt taking every other day or as tolerated 150 tablet 2     atorvastatin (LIPITOR) 20 MG tablet Take 1 tablet (20 mg total) by mouth at bedtime. 90 tablet 3     carvedilol (COREG) 25 MG tablet Take 1 tablet (25 mg total) by mouth 2 (two) times a day with meals. 90 tablet 3     clonazePAM (KLONOPIN) 0.5 MG tablet Take 1 tablet (0.5 mg total) by mouth 2 (two) times a day. 60 tablet 0     fluticasone-vilanterol (BREO ELLIPTA) 200-25 mcg/dose DsDv inhaler Inhale 1 puff daily. 1 each 12     folic acid (FOLVITE) 1 MG tablet TAKE ONE TABLET BY MOUTH EVERY  tablet 3     furosemide (LASIX) 40 MG tablet TAKE ONE TABLET BY MOUTH EVERY DAY 90 tablet 3     indomethacin (INDOCIN) 50 MG capsule TAKE ONE CAPSULE BY MOUTH THREE TIMES A DAY WITH FOOD AS NEEDED 30 capsule 2     lisinopril (PRINIVIL,ZESTRIL) 20 MG tablet Take 1 tablet (20 mg total) by mouth 2 (two) times a day. 180 tablet 3     LORazepam (ATIVAN) 1 MG tablet Take 0.5 tablets (0.5 mg total) by mouth 3 (three) times a day as needed for anxiety. 15 tablet 0     magnesium oxide (MAGOX) 400 mg tablet 400mg daily 90 tablet 3     multivitamin (MULTIVITAMIN) per tablet Take 1 tablet by mouth daily.       omeprazole (PRILOSEC) 20 MG capsule Take 20 mg by mouth bedtime.        oxyCODONE-acetaminophen (PERCOCET) 5-325 mg per tablet TAKE ONE TABLET BY MOUTH EVERY 8 HOURS AS NEEDED FOR PAIN 60 tablet 0     PARoxetine (PAXIL) 40 MG tablet Take 0.5 tablets (20 mg total) by mouth at bedtime. 45 tablet 3     potassium chloride SA (K-DUR,KLOR-CON) 20 MEQ tablet Take 1 tablet (20 mEq total) by mouth daily. 60 tablet 5     Current Facility-Administered Medications   Medication Dose Route Frequency Provider Last Rate Last Dose     cyanocobalamin injection 1,000 mcg  1,000 mcg Intramuscular Q30 Days Cristela De Jesus MD   1,000 mcg at 04/03/18 1030     Labs:  Reviewed in Epic.    Imaging studies (unchanged from prior visit):  CT CAP 1/18/18:  1.  Left lower lobectomy. Multiple groundglass and solid nodules in the left lung have slightly increased in size and number, which is concerning for progression of malignancy. An inflammatory or infectious process is still possible. Many of these nodules may be too small for evaluation by PET/CT. Percutaneous biopsy would be difficult at this point. Attention on follow-up imaging.  2.  Incompletely assessed splenic lesion, unchanged in size.  3.  New sclerosis of the T8 vertebral body is nonspecific though interval fracture or metastasis is possible.  4.  There is an acute appearing fracture of the posterolateral left seventh rib. Numerous additional healed or healing fractures are noted in the ribs.  5.  Advanced atherosclerotic disease.  6.  Cholelithiasis and extrahepatic biliary dilatation.    PFT's 3/20/18 (unchanged from prior visit):  FEV1/FVC is 58% and is reduced.  FEV1 is 1.24L (43%) predicted and is reduced.  FVC is 2.14L (58%) predicted and reduced.  There was no improvement in spirometry after a single inhaled dose of bronchodilator.  TLC is 6.27L (99%) predicted and is normal.  RV is 3.86L (154%) predicted and is increased.  DLCO is 13.38ml/min/hg (54%) predicted and is reduced when it is corrected for hemoglobin.  Flow volume loops indicate  mild scooping in the expiratory limb.    Impression:  Full Pulmonary Function Test is abnormal.  PFTs are consistent with severe obstructive disease.  Spirometry is not consistent with reversibility.  There is no hyperinflation.  There is air-trapping.  Diffusion capacity when corrected for hemoglobin is moderately reduced.    6MWT 3/26/18:  The patient walked a total of 182 meters (39% predicted*).  Breathing room air, SpO2 patti was 91% and HRmax was 90 bpm.  Impression:  Six minute walk distance is reduced.  The patient demonstrates no significant ambulatory hypoxia breathing room air.     *based on reference equations taken from Omer PL and Mariah MERA. Am J Respir Crit Care Med 1998 Nov;158(5 Pt 1):1384-7       Physical Exam:  /66  Pulse 65  Resp 20  Wt 191 lb (86.6 kg)  SpO2 94% Comment: RA  BMI 30.83 kg/m2  Heart Rate: 65  Resp: 20  BP: 124/66  SpO2: 94 % (RA)  Weight: 191 lb (86.6 kg)  Physical Exam   Constitutional: He is oriented to person, place, and time. He appears well-developed and well-nourished. No distress.   HENT:   Head: Normocephalic.   Eyes: Pupils are equal, round, and reactive to light.   Neck: Normal range of motion.   Cardiovascular: Normal rate.    Irregularly irregular.   Pulmonary/Chest: Effort normal and breath sounds normal.   Abdominal: Soft.   Musculoskeletal: Normal range of motion.   Neurological: He is alert and oriented to person, place, and time.   Skin: Skin is warm. He is not diaphoretic.   Psychiatric: He has a normal mood and affect.         Assessment and Plan:Christian Rees is a 67 y.o. with a past medical history significant for nxiety, depression, gout, heart failure, hypertension, EKTA, T1b N1 M0 adenocarcinoma of the left lower lobe status post left lower lobe lobectomy with adjuvant chemotherapy was had a subsequent recurrence to his lymph nodes and bones that was diagnosed in February 2016 who presents to clinic today for evaluation of worsening  SOB.  His pulmonary function tests indicate severe obstructive lung disease with air trapping and a moderate reduction in his diffusion capacity.  Unfortunately I do not have his prior lobectomy pulmonary function testing and had and so I am not able to speak to a decline in his lung function testing.  The severity of his obstruction would certainly account for his symptom complex he is presently undertreated for the latter.  For the present we would recommend;    1. Severe obstructive lung disease: While he demonstrated a moderate reduction in his diffusion capacity, a 6 walk test did not demonstrate hypoxia or desaturation.    Continue uticasone/Vilanterol 1 puff daily.  Patient has been instructed on how to use this and reminded to rinse his mouth out after he uses this.    He has also been reminded to use as needed albuterol for rescue and at his next clinic visit we will determine the need to further escalate therapy.    The patient is fairly functional presently but at his next clinic visit we will address pulmonary rehabilitation with him.    Patient has been struggling with anxiety related to shortness of breath which does respond to Klonopin and we briefly talked about initiating morphine to help with the symptoms as well.  Patient will try this at night and see if this helps him.  2. Recurrent metastatic lung cancer: Is presently being expectantly managed by the oncology service.  3. HCM: Is up-to-date with his influenza and pneumococcal vaccines.  4. Follow-up: 3 months.      Nadine Blue  Pulmonary and Critical Care  [id number]

## 2021-06-17 NOTE — PROGRESS NOTES
"  Assessment/Plan:      Generalized anxiety disorder/Dizziness  Likely uncontrolled generalized anxiety versus possibly withdrawal from paroxetine.  We discussed options including going up on fluoxetine and adding back a bit of paroxetine for a slower taper versus going back to paroxetine altogether.  Patient would like to restart paroxetine.  He will restart this at 20 mg at night and stop fluoxetine.  He has only been on fluoxetine for 1 month.  He will continue twice daily clonazepam with lorazepam once daily in between as needed.  He will update me later this week or early next week with how he is feeling.        Subjective:       Christian Rees is a 67 y.o. male who presents for evaluation of feeling dizzy, \"off\", slow mentally, and very anxious.  Patient feels as though he is \"on the verge of panic\".  He does feel short of breath from time to time, but overall states his breathing has improved on his new inhaler.  He was recently diagnosed with restrictive lung disease.  He ended up in the emergency department on 4/11/18 feeling extremely short of breath and panicky.  A workup was performed including a CT PE run which was negative for PE.  His troponin was negative at that time as well.  He did have very high blood pressures in the emergency department.  He denies chest pain.  Overall he states he feels \"off\", very anxious.  To review, 1 month ago we changed him over from paroxetine to fluoxetine.  A month prior to that, he had decreased his dose of paroxetine.  He had some excessive sweating from paroxetine, was unsure how well this is working for him, and his insurance preferred other agents.  He has no additional questions or concerns today.  To clarify, he states that he does not necessarily feel dizzy, just very anxious.    Labs Reviewed:  Recent Results (from the past 168 hour(s))   Basic Metabolic Panel   Result Value Ref Range    Sodium 143 136 - 145 mmol/L    Potassium 4.2 3.5 - 5.0 mmol/L    " Chloride 98 98 - 107 mmol/L    CO2 32 (H) 22 - 31 mmol/L    Anion Gap, Calculation 13 5 - 18 mmol/L    Glucose 117 70 - 125 mg/dL    Calcium 9.6 8.5 - 10.5 mg/dL    BUN 15 8 - 22 mg/dL    Creatinine 0.87 0.70 - 1.30 mg/dL    GFR MDRD Af Amer >60 >60 mL/min/1.73m2    GFR MDRD Non Af Amer >60 >60 mL/min/1.73m2   Troponin I   Result Value Ref Range    Troponin I 0.02 0.00 - 0.29 ng/mL   HEM 2 Without Diff   Result Value Ref Range    WBC 8.4 4.0 - 11.0 thou/uL    RBC 3.57 (L) 4.40 - 6.20 mill/uL    Hemoglobin 12.4 (L) 14.0 - 18.0 g/dL    Hematocrit 37.0 (L) 40.0 - 54.0 %     (H) 80 - 100 fL    MCH 34.7 (H) 27.0 - 34.0 pg    MCHC 33.5 32.0 - 36.0 g/dL    RDW 13.4 11.0 - 14.5 %    Platelets 138 (L) 140 - 440 thou/uL    MPV 9.6 8.5 - 12.5 fL   Protime-INR   Result Value Ref Range    INR 1.05 0.90 - 1.10   APTT   Result Value Ref Range    PTT 27 24 - 37 seconds   BNP(B-type Natriuretic Peptide)   Result Value Ref Range     (H) 0 - 65 pg/mL   D-dimer, Quantitative   Result Value Ref Range    D-Dimer, Quant 2.13 (H) <=0.50 FEU ug/mL   ECG 12 lead   Result Value Ref Range    SYSTOLIC BLOOD PRESSURE  mmHg    DIASTOLIC BLOOD PRESSURE  mmHg    VENTRICULAR RATE 68 BPM    ATRIAL RATE 68 BPM    P-R INTERVAL 182 ms    QRS DURATION 130 ms    Q-T INTERVAL 434 ms    QTC CALCULATION (BEZET) 461 ms    P Axis 32 degrees    R AXIS -68 degrees    T AXIS 3 degrees    MUSE DIAGNOSIS       Normal sinus rhythm  Right bundle branch block  Left anterior fascicular block  ** Bifascicular block **  Abnormal ECG  When compared with ECG of 24-JAN-2017 15:16,  Premature supraventricular complexes are no longer Present  Confirmed by BEATRIZ GRESHAM MD LOC:[facility] ([id number]) on 4/11/2018 3:44:48 PM         The following portions of the patient's history were reviewed and updated as appropriate: allergies, current medications, past medical history, past social history and problem list.      Current Outpatient Prescriptions:      albuterol (PROAIR  HFA;PROVENTIL HFA;VENTOLIN HFA) 90 mcg/actuation inhaler, Inhale 2 puffs every 6 (six) hours as needed for wheezing., Disp: 1 each, Rfl: 3     aspirin 81 MG EC tablet, Take 1 tablet (81 mg total) by mouth daily. Pt taking every other day or as tolerated, Disp: 150 tablet, Rfl: 2     atorvastatin (LIPITOR) 20 MG tablet, Take 1 tablet (20 mg total) by mouth at bedtime., Disp: 90 tablet, Rfl: 3     carvedilol (COREG) 25 MG tablet, Take 1 tablet (25 mg total) by mouth 2 (two) times a day with meals., Disp: 90 tablet, Rfl: 3     clonazePAM (KLONOPIN) 0.5 MG tablet, Take 1 tablet (0.5 mg total) by mouth 2 (two) times a day., Disp: 60 tablet, Rfl: 0     FLUoxetine (PROZAC) 20 MG capsule, Take 1 capsule (20 mg total) by mouth daily., Disp: 90 capsule, Rfl: 3     fluticasone-vilanterol (BREO ELLIPTA) 200-25 mcg/dose DsDv inhaler, Inhale 1 puff daily., Disp: 1 each, Rfl: 12     folic acid (FOLVITE) 1 MG tablet, TAKE ONE TABLET BY MOUTH EVERY DAY, Disp: 100 tablet, Rfl: 3     furosemide (LASIX) 40 MG tablet, TAKE ONE TABLET BY MOUTH EVERY DAY, Disp: 90 tablet, Rfl: 3     indomethacin (INDOCIN) 50 MG capsule, TAKE ONE CAPSULE BY MOUTH THREE TIMES A DAY WITH FOOD AS NEEDED, Disp: 30 capsule, Rfl: 2     lisinopril (PRINIVIL,ZESTRIL) 20 MG tablet, Take 1 tablet (20 mg total) by mouth 2 (two) times a day., Disp: 180 tablet, Rfl: 3     LORazepam (ATIVAN) 1 MG tablet, Take 0.5 tablets (0.5 mg total) by mouth 3 (three) times a day as needed for anxiety., Disp: 15 tablet, Rfl: 0     magnesium oxide (MAGOX) 400 mg tablet, 400mg daily, Disp: 90 tablet, Rfl: 3     multivitamin (MULTIVITAMIN) per tablet, Take 1 tablet by mouth daily., Disp: , Rfl:      omeprazole (PRILOSEC) 20 MG capsule, Take 20 mg by mouth bedtime., Disp: , Rfl:      oxyCODONE-acetaminophen (PERCOCET) 5-325 mg per tablet, Take 1 tablet by mouth every 8 (eight) hours as needed for pain., Disp: 60 tablet, Rfl: 0     potassium chloride SA (K-DUR,KLOR-CON) 20 MEQ tablet,  "Take 1 tablet (20 mEq total) by mouth daily., Disp: 60 tablet, Rfl: 5    Current Facility-Administered Medications:      cyanocobalamin injection 1,000 mcg, 1,000 mcg, Intramuscular, Q30 Days, Cristela De Jesus MD, 1,000 mcg at 04/03/18 1030    Review of Systems   A 12 point comprehensive review of systems was negative except as noted.      Objective:      /66 (Patient Site: Right Arm, Patient Position: Sitting, Cuff Size: Adult Large)  Pulse 64  Resp 24  Ht 5' 6\" (1.676 m)  Wt 198 lb 8 oz (90 kg)  BMI 32.04 kg/m2    General appearance: alert, appears stated age and cooperative  Head: Normocephalic, without obvious abnormality, atraumatic  Eyes: Conjunctivae clear, sclerae anicteric  Lungs: Diminished breath sounds throughout, consistent with previous, no wheeze or rhonchi  Heart: regular rate and rhythm, S1, S2 normal, no murmur, click, rub or gallop  Neurologic: Speech is slightly slower than usual, thought process is linear, alert and oriented ×3  Psychiatric: Affect is flat, mood is described as severely anxious          "

## 2021-06-17 NOTE — PROGRESS NOTES
Prior Authorization Request  Who s requesting:  Pharmacy  Pharmacy Name and Location: [facility] [place]  [place], [place] - [address] [address]  Medication Name: fluticasone-umeclidinium-vilanterol (TRELEGY ELLIPTA) 100-62.5-25 mcg DsDv inhaler  Insurance Plan: [employer]  Insurance Member ID Number: [id number]

## 2021-06-17 NOTE — PROGRESS NOTES
Assessment/Plan:       1. Generalized anxiety disorder  Doing better on current dose of Paxil, but mood still not ideally controlled.  Discuss today possibly having him see a psychiatrist, he would like to think about this.  Added gabapentin today as below.  This might be helpful for mood as well.  Does have some sweating side effect from Paxil.  - Comprehensive Metabolic Panel    2. Shortness of breath  I suspect this is a combination of COPD and fluid overload.  For his COPD, he is following with pulmonology and attempting to find an inhaler that he can afford.  He has not been using morphine at bedtime for breathlessness, we discussed how to use this.  - Comprehensive Metabolic Panel  - BNP(B-type Natriuretic Peptide)    3. Chronic diastolic heart failure  I suspect patient is a bit fluid up.  Checking labs as below.  Addendum: BNP returned elevated, advised him to increase his furosemide to double dose for 3 days, then back down to normal.  Also advised avoiding salt, elevating legs when possible.  - Comprehensive Metabolic Panel  - BNP(B-type Natriuretic Peptide)    4. Chronic midline low back pain without sciatica  Patient has undergone physical therapy, this is cost prohibitive for him.  He takes some Tylenol and Aleve, sparingly takes oxycodone.  He has been having a lot of pain in his back and hips.  Discussed a trial of gabapentin.  If this is not helpful, consider tramadol with controlled substance agreement.    5. Elevated liver enzymes  This is found on lab work today.  We will investigate further at next visit.        Subjective:       Christian Rees is a 67 y.o. male who presents for recheck of his mood.  We had tried changing him over to fluoxetine as he was having significant sweating from Paxil and this was not appropriately controlling his anxiety.  He ended up having severe anxiety with panic on fluoxetine.  We then transitioned him back to Paxil.  He reports he feels much better in terms  of his mood.  He still feels moderately anxious.  He is back to having some drenching sweats.  He states he prefers sweats over anxiety.  He has never seen a psychiatrist, is not currently seeing a therapist.  In terms of his back, he reports 8-9/10 pain in his hips and lower back along with some trouble with his balance.  He has been taking Tylenol and Aleve, very occasionally and oxycodone for this pain.  He has had surgery on both hips, has dislocated one or both of his hips.  He has undergone physical therapy for his back pain, but reports he has to pay $35 per session which is cost prohibitive for him.  He describes central low back pain that does not currently radiate down his legs.  He denies any numbness or tingling in his legs at present.  He continues with significant edema in the legs.  He states his diet has not changed.    The following portions of the patient's history were reviewed and updated as appropriate: allergies, current medications, past medical history, past surgical history and problem list.      Current Outpatient Prescriptions:      albuterol (PROAIR HFA;PROVENTIL HFA;VENTOLIN HFA) 90 mcg/actuation inhaler, Inhale 2 puffs every 6 (six) hours as needed for wheezing., Disp: 1 each, Rfl: 3     aspirin 81 MG EC tablet, Take 1 tablet (81 mg total) by mouth daily. Pt taking every other day or as tolerated, Disp: 150 tablet, Rfl: 2     atorvastatin (LIPITOR) 20 MG tablet, Take 1 tablet (20 mg total) by mouth at bedtime., Disp: 90 tablet, Rfl: 3     carvedilol (COREG) 25 MG tablet, Take 1 tablet (25 mg total) by mouth 2 (two) times a day with meals., Disp: 90 tablet, Rfl: 3     clonazePAM (KLONOPIN) 0.5 MG tablet, Take 1 tablet (0.5 mg total) by mouth 2 (two) times a day., Disp: 60 tablet, Rfl: 0     fluticasone-umeclidinium-vilanterol (TRELEGY ELLIPTA) 100-62.5-25 mcg DsDv inhaler, Inhale 1 Inhalation daily., Disp: 1 each, Rfl: 12     folic acid (FOLVITE) 1 MG tablet, TAKE ONE TABLET BY MOUTH  "EVERY DAY, Disp: 100 tablet, Rfl: 3     furosemide (LASIX) 40 MG tablet, TAKE ONE TABLET BY MOUTH EVERY DAY, Disp: 90 tablet, Rfl: 3     indomethacin (INDOCIN) 50 MG capsule, TAKE ONE CAPSULE BY MOUTH THREE TIMES A DAY WITH FOOD AS NEEDED, Disp: 30 capsule, Rfl: 2     lisinopril (PRINIVIL,ZESTRIL) 20 MG tablet, Take 1 tablet (20 mg total) by mouth 2 (two) times a day., Disp: 180 tablet, Rfl: 3     magnesium oxide (MAGOX) 400 mg tablet, 400mg daily, Disp: 90 tablet, Rfl: 3     morphine 20 mg/5 mL (4 mg/mL) solution, Take 0.6 mL (2.4 mg total) by mouth every 6 (six) hours as needed for pain., Disp: 120 mL, Rfl: 0     multivitamin (MULTIVITAMIN) per tablet, Take 1 tablet by mouth daily., Disp: , Rfl:      omeprazole (PRILOSEC) 20 MG capsule, Take 20 mg by mouth bedtime., Disp: , Rfl:      oxyCODONE-acetaminophen (PERCOCET) 5-325 mg per tablet, TAKE ONE TABLET BY MOUTH EVERY 8 HOURS AS NEEDED FOR PAIN, Disp: 60 tablet, Rfl: 0     PARoxetine (PAXIL) 40 MG tablet, Take 0.5 tablets (20 mg total) by mouth at bedtime., Disp: 45 tablet, Rfl: 3     potassium chloride SA (K-DUR,KLOR-CON) 20 MEQ tablet, Take 1 tablet (20 mEq total) by mouth daily., Disp: 60 tablet, Rfl: 5     fluticasone-vilanterol (BREO ELLIPTA) 200-25 mcg/dose DsDv inhaler, Inhale 1 puff daily., Disp: 1 each, Rfl: 12    Current Facility-Administered Medications:      cyanocobalamin injection 1,000 mcg, 1,000 mcg, Intramuscular, Q30 Days, Cristela De Jesus MD, 1,000 mcg at 05/01/18 1017    Review of Systems   A 12 point comprehensive review of systems was negative except as noted.      Objective:      /60 (Patient Site: Right Arm, Patient Position: Sitting, Cuff Size: Adult Regular)  Pulse 88  Resp 24  Ht 5' 6\" (1.676 m)  Wt 199 lb 8 oz (90.5 kg)  BMI 32.2 kg/m2    General appearance: alert, appears stated age and cooperative  Head: Normocephalic, without obvious abnormality, atraumatic  Eyes: Conjunctivae clear, sclerae anicteric  Lungs: " Diminished breath sounds throughout, consistent with previous, no significant wheeze  Heart: regular rate and rhythm, S1, S2 normal, no murmur, click, rub or gallop  Extremities: 2-3+ pitting edema bilateral lower extremities to the knees  Psychiatric: Speech is fluent and thought processes linear, mood is described as mildly anxious, affect is anxious        Results for orders placed or performed in visit on 05/04/18   Comprehensive Metabolic Panel   Result Value Ref Range    Sodium 140 136 - 145 mmol/L    Potassium 4.6 3.5 - 5.0 mmol/L    Chloride 102 98 - 107 mmol/L    CO2 24 22 - 31 mmol/L    Anion Gap, Calculation 14 5 - 18 mmol/L    Glucose 102 70 - 125 mg/dL    BUN 20 8 - 22 mg/dL    Creatinine 1.07 0.70 - 1.30 mg/dL    GFR MDRD Af Amer >60 >60 mL/min/1.73m2    GFR MDRD Non Af Amer >60 >60 mL/min/1.73m2    Bilirubin, Total 0.7 0.0 - 1.0 mg/dL    Calcium 8.6 8.5 - 10.5 mg/dL    Protein, Total 5.4 (L) 6.0 - 8.0 g/dL    Albumin 2.8 (L) 3.5 - 5.0 g/dL    Alkaline Phosphatase 362 (H) 45 - 120 U/L    AST 59 (H) 0 - 40 U/L    ALT 36 0 - 45 U/L   BNP(B-type Natriuretic Peptide)   Result Value Ref Range     (H) 0 - 65 pg/mL

## 2021-06-17 NOTE — PROGRESS NOTES
Called [facility] pharmacy to verify if PAs needed for trelegy and ventolin. Talked to Manuel and she stated that ventolin does not need PA, it was a mistake in paper work. She also stated that patient tried to fill trelegy too soon in the 30 days. Next approve michelle date is May 18, 2018. Tried calling patient but did not answer, left voice message to call back.

## 2021-06-18 NOTE — PROGRESS NOTES
"  Assessment/Plan:       1. Chronic right-sided low back pain without sciatica  Patient has significant chronic back pains.  They are always without sciatica.  He denies any recent falls or recent injuries.  He is short of breath and wincing in pain, needs to lie down during the examination due to severe pain.  For these reasons, I recommended more urgent evaluation at the emergency department along with pain control.  I called and spoke with Dr. Kerns at [facility] emergency department who will be expecting the patient.    2. Shortness of breath  Suspect primarily related to COPD, although possibly could have a slight exacerbation of CHF as well.  He does not appear dramatically fluid overloaded, I do not hear crackles.  He has been having trouble getting his inhalers covered by his insurance, follows with Dr. Blue at pulmonology.    3. Anemia  This is a historical diagnosis for the patient.  He appears pale today and is quite lightheaded.  He denies any black or bloody stools.        Subjective:       Christian Rees is a 67 y.o. male who presents for a chief complaint of \"shortness of breath\".  During the examination, patient winces in pain several times related to pain in his right lower back and right shoulder blade area.  He also states he experienced some left neck pain last night that made it difficult for him to sleep.  He really did not get any sleep last night.  At his last visit, we started gabapentin and recommended titrating up to 100 mg 3 times daily.  He currently takes this twice daily and has not noticed much improvement in his chronic back pain.  This is always without radiculopathy.  He has had Percocet for quite some time that he takes sparingly, primarily for hip pain.  Patient states he has been taking this every 6 hours for back pain.  His pain seemed to migrate, from the low back to the upper back or the neck.  He states he is in so much pain that he \"does not want to live like this\".  " He has been following with pulmonology for his shortness of breath, had a fairly recent diagnosis of COPD and has been having trouble getting his inhalers covered by insurance.  This is cost prohibitive for him.  It is unclear if he is currently using his inhaler.  He has been compliant with all of his medications.  Last night he reports he nearly called an ambulance because he was feeling so short of breath when he laid down.  He was also in a lot of back pain.    Imaging Reviewed:  XR LUMBAR SPINE 2 OR 3 VWS  10/25/2017 4:27 PM  INDICATION: radicular pain right lower back/leg, no injury  COMPARISON: None.  FINDINGS: 5 lumbar type vertebral bodies. Bilateral total hip arthroplasties. 10 degrees lumbar levoscoliotic curvature. Mild apex right curvature thoracolumbar junction. Bone subjectively osteopenic. No compression fracture. 6 mm retrolisthesis L2 on L3. Moderate to advanced multilevel degenerative disc disease with loss of disc height and endplate osteophyte formation throughout the lumbar spine. Moderate degenerative set disease in lower lumbar spine. Atherosclerotic disease in the prevertebral soft tissues. Aorta appears to measure up to 3.2 cm suggesting mild aneurysmal dilatation.    The following portions of the patient's history were reviewed and updated as appropriate: allergies, current medications, past medical history, past surgical history and problem list.      Current Outpatient Prescriptions:      albuterol (PROAIR HFA;PROVENTIL HFA;VENTOLIN HFA) 90 mcg/actuation inhaler, Inhale 2 puffs every 6 (six) hours as needed for wheezing., Disp: 1 each, Rfl: 3     atorvastatin (LIPITOR) 20 MG tablet, Take 1 tablet (20 mg total) by mouth at bedtime., Disp: 90 tablet, Rfl: 3     carvedilol (COREG) 25 MG tablet, Take 1 tablet (25 mg total) by mouth 2 (two) times a day with meals., Disp: 90 tablet, Rfl: 3     clonazePAM (KLONOPIN) 0.5 MG tablet, Take 1 tablet (0.5 mg total) by mouth 2 (two) times a day.,  "Disp: 60 tablet, Rfl: 0     fluticasone-umeclidinium-vilanterol (TRELEGY ELLIPTA) 100-62.5-25 mcg DsDv inhaler, Inhale 1 Inhalation daily., Disp: 1 each, Rfl: 12     folic acid (FOLVITE) 1 MG tablet, TAKE ONE TABLET BY MOUTH EVERY DAY, Disp: 100 tablet, Rfl: 3     furosemide (LASIX) 40 MG tablet, TAKE ONE TABLET BY MOUTH EVERY DAY, Disp: 90 tablet, Rfl: 3     gabapentin (NEURONTIN) 100 MG capsule, Take 100 mg by mouth 3 (three) times a day., Disp: 90 capsule, Rfl: 0     indomethacin (INDOCIN) 50 MG capsule, TAKE ONE CAPSULE BY MOUTH THREE TIMES A DAY WITH FOOD AS NEEDED, Disp: 30 capsule, Rfl: 2     lisinopril (PRINIVIL,ZESTRIL) 20 MG tablet, Take 1 tablet (20 mg total) by mouth 2 (two) times a day., Disp: 180 tablet, Rfl: 3     magnesium oxide (MAGOX) 400 mg tablet, 400mg daily, Disp: 90 tablet, Rfl: 3     morphine 20 mg/5 mL (4 mg/mL) solution, Take 0.6 mL (2.4 mg total) by mouth every 6 (six) hours as needed for pain., Disp: 120 mL, Rfl: 0     multivitamin (MULTIVITAMIN) per tablet, Take 1 tablet by mouth daily., Disp: , Rfl:      omeprazole (PRILOSEC) 20 MG capsule, Take 20 mg by mouth bedtime., Disp: , Rfl:      oxyCODONE-acetaminophen (PERCOCET) 5-325 mg per tablet, TAKE ONE TABLET BY MOUTH EVERY 8 HOURS AS NEEDED FOR PAIN, Disp: 60 tablet, Rfl: 0     PARoxetine (PAXIL) 40 MG tablet, Take 0.5 tablets (20 mg total) by mouth at bedtime., Disp: 45 tablet, Rfl: 3     potassium chloride SA (K-DUR,KLOR-CON) 20 MEQ tablet, Take 1 tablet (20 mEq total) by mouth daily., Disp: 60 tablet, Rfl: 5    Current Facility-Administered Medications:      cyanocobalamin injection 1,000 mcg, 1,000 mcg, Intramuscular, Q30 Days, Cristela De Jesus MD, 1,000 mcg at 05/01/18 1017    Review of Systems   Pertinent items are noted in HPI.      Objective:      /62 (Patient Site: Left Arm, Patient Position: Sitting, Cuff Size: Adult Regular)  Pulse 65  Temp 97.5  F (36.4  C)  Resp (!) 32  Ht 5' 6\" (1.676 m)  Wt 190 lb 6.4 oz " (86.4 kg)  SpO2 95%  BMI 30.73 kg/m2    General appearance: appears stated age, cooperative and fatigued  Head: Normocephalic, without obvious abnormality, atraumatic  Eyes: Conjunctivae clear, sclerae anicteric  Lungs: No crackles, prolonged expiratory phase, wheeze with expiration  Heart: regular rate and rhythm, S1, S2 normal, no murmur, click, rub or gallop  Extremities: Minimal pitting edema in the ankles bilaterally  Neurologic: Slow and wide-based gait, slowed speech and thought process, appears fatigued

## 2021-06-18 NOTE — PROGRESS NOTES
"  Assessment/Plan:       1. Lung cancer metastatic to bone (H)  Patient following with [facility] cancer care Dr. Li.  He has an appointment in 2 days to discuss chemotherapy.  In the hospital, he was not yet ready to discuss hospice, although he would benefit from palliative care.  He has been noncompliant with his pain medications as discussed below.    2. Cancer related pain  Patient has not been using his OxyContin or oxycodone as prescribed.  He has not been scheduling Tylenol.  States this is because he \"does not trust anyone else\", and wanted me to tell him what he should take.  I reassured him that this is the best plan moving forward and will allow us to make changes in a more organized fashion.  He will go back to scheduling Tylenol and gabapentin, will schedule OxyContin twice daily and use oxycodone as needed.  Advised them to leave the as needed oxycodone on a note pad so that they can write down the time at which this was administered.  Will follow with Dr. Li to chemotherapy and recommended follow-up with me in 2-4 weeks to discuss her pain control.  He does have home care involved as well.    3. Generalized weakness  This is related to pain at present.  Patient states he feels \"like I am dying\".  Discussed that we need to achieve better pain control and this will allow for more thoughtful discussion on the patient's part about goals of care moving forward.    4. Depression with anxiety  Patient did have a psychiatry consult as an inpatient.  It was recommended that he increase his Paxil to 30 mg daily.  He was already taking this at home.  Advised holding steady at this dose for now.  He continues on clonazepam twice daily as well.  It was discussed that he could try mirtazapine 7.5 mg nightly PRN for appetite and sleep, this was never started.  He will be attending [facility] through [facility]  - PARoxetine (PAXIL) 30 MG tablet; Take 1 tablet (30 mg total) by mouth at bedtime.  " "Dispense: 30 tablet; Refill: 0    5. Generalized anxiety disorder  See above #4.    6. Mixed hyperlipidemia  Recommended the patient discontinue his statin for now.  He has a lot of generalized weakness, this can always be restarted in the future when he is feeling better.    7. Idiopathic chronic gout without tophus, unspecified site  This has resolved.  There is an interaction with colchicine, so this was discontinued.  In future, any gout flares should be treated with steroids.        Subjective:       Christian Rees is a 68 y.o. male who presents for hospital follow-up.  See previous note for details, I recommended patient be evaluated at the hospital when he presented to clinic with severe back and rib cage pain.  He was found to have multiple rib fractures related to metastatic lung cancer to the bone.  He was admitted for pain control.  He was hospitalized from 6/4 to 6/9/18.  Patient was not ready to be admitted to hospice, although palliative care was involved during his hospitalization.  He was recommended to schedule 1000 mg of Tylenol 3 times daily, continue gabapentin 3 times daily, schedule OxyContin 10 mg twice daily and use PRN oxycodone 5 mg.  Patient's daughter-in-law, who accompanies him today, says that he has been noncompliant with this regimen, stating that he doesn't trust anyone but Dr. De Jesus and he wanted me to tell him how best to control his pain.  As such, he is extremely weak today and in severe pain related to his ribs and lower back.  He also feels very anxious and states he feels \"like I am dying\".  His breathing has been stable, as is his lower extremity edema.    Labs Reviewed:  Lab Results   Component Value Date    WBC 10.3 06/14/2018    HGB 8.3 (L) 06/14/2018    HCT 26.5 (L) 06/14/2018     06/14/2018    CHOL 152 02/07/2018    TRIG 234 (H) 02/07/2018    HDL 77 02/07/2018    LDLDIRECT 92 01/05/2015    ALT 28 06/14/2018    AST 24 06/14/2018     06/14/2018    K 4.3 " 06/14/2018     06/14/2018    CREATININE 1.71 (H) 06/14/2018    BUN 25 06/14/2018    CO2 25 06/14/2018    TSH 1.32 06/14/2018    PSA 0.7 02/22/2016    INR 1.15 (H) 06/04/2018       Imaging Reviewed:  CTA CHEST ABDOMEN PELVIS  6/4/2018 10:35 AM  CONCLUSION:  1.  No aortic dissection is seen. No pulmonary embolism is seen.  2.  There are small bilateral pleural effusions. There are extensive coronary artery calcifications.  3.  There are multiple pulmonary nodules consistent with metastasis and similar to the study from 04/11/2018. One on the right near the minor fissure measuring 10 mm has increased in size from 6 mm on the prior study.  4.  Gallstones.  5.  Sigmoid diverticulosis.  6.  The patient has had a destructive metastasis visible in the right lateral sixth rib on prior studies. There now also are metastasis visible in the third and seventh ribs. There are acute fractures of the right fifth, seventh, eighth and 11th ribs and a fracture of the right transverse process of T10  7.  Sclerosis in T8 and L4-L5 is similar to the prior study. There is a new sclerotic lesion in T4      The following portions of the patient's history were reviewed and updated as appropriate: allergies, current medications, past medical history, past social history and problem list.      Current Outpatient Prescriptions:      acetaminophen (TYLENOL) 500 MG tablet, Take 2 tablets (1,000 mg total) by mouth 3 (three) times a day., Disp: , Rfl: 0     albuterol (PROAIR HFA;PROVENTIL HFA;VENTOLIN HFA) 90 mcg/actuation inhaler, Inhale 2 puffs every 6 (six) hours as needed for wheezing., Disp: 1 each, Rfl: 3     aspirin 81 MG EC tablet, Take 81 mg by mouth daily., Disp: , Rfl:      atorvastatin (LIPITOR) 20 MG tablet, Take 1 tablet (20 mg total) by mouth at bedtime., Disp: 90 tablet, Rfl: 3     calcium-vitamin D 500 mg(1,250mg) -200 unit per tablet, Take 1 tablet by mouth daily., Disp: , Rfl: 0     carvedilol (COREG) 25 MG tablet, Take 0.5  tablets (12.5 mg total) by mouth 2 (two) times a day with meals., Disp: 30 tablet, Rfl: 0     clonazePAM (KLONOPIN) 0.5 MG tablet, Take 1 tablet (0.5 mg total) by mouth 2 (two) times a day., Disp: 60 tablet, Rfl: 0     fluticasone-umeclidinium-vilanterol (TRELEGY ELLIPTA) 100-62.5-25 mcg DsDv inhaler, Inhale 1 Inhalation daily., Disp: 1 each, Rfl: 12     folic acid (FOLVITE) 1 MG tablet, TAKE ONE TABLET BY MOUTH EVERY DAY, Disp: 100 tablet, Rfl: 3     furosemide (LASIX) 40 MG tablet, TAKE ONE TABLET BY MOUTH EVERY DAY, Disp: 90 tablet, Rfl: 3     gabapentin (NEURONTIN) 100 MG capsule, Take 100 mg by mouth 3 (three) times a day. (Patient taking differently: Take 100 mg by mouth 2 (two) times a day. ), Disp: 90 capsule, Rfl: 0     magnesium oxide (MAGOX) 400 mg tablet, 400mg daily, Disp: 90 tablet, Rfl: 3     multivitamin (MULTIVITAMIN) per tablet, Take 1 tablet by mouth daily., Disp: , Rfl:      omeprazole (PRILOSEC) 20 MG capsule, Take 20 mg by mouth bedtime., Disp: , Rfl:      PARoxetine (PAXIL) 30 MG tablet, Take 1 tablet (30 mg total) by mouth at bedtime. (Patient taking differently: Take 40 mg by mouth at bedtime. ), Disp: 30 tablet, Rfl: 0     polyethylene glycol (MIRALAX) 17 gram packet, Take 1 packet (17 g total) by mouth daily as needed., Disp: , Rfl: 0     senna-docusate (PERICOLACE) 8.6-50 mg tablet, Take 1 tablet by mouth 2 (two) times a day., Disp: , Rfl: 0     colchicine 0.6 mg tablet, Take 1 tablet (0.6 mg total) by mouth 2 (two) times a day for 4 days., Disp: 8 tablet, Rfl: 0     oxyCODONE (OXYCONTIN) 10 mg 12 hr tablet, Take 1 tablet (10 mg total) by mouth every 12 (twelve) hours., Disp: 10 each, Rfl: 0     oxyCODONE (ROXICODONE) 5 MG immediate release tablet, Take 1 tablet (5 mg total) by mouth every 4 (four) hours as needed., Disp: 18 tablet, Rfl: 0    Current Facility-Administered Medications:      cyanocobalamin injection 1,000 mcg, 1,000 mcg, Intramuscular, Q30 Days, Cristela De Jesus MD,  "1,000 mcg at 05/01/18 1017    Review of Systems   A 12 point comprehensive review of systems was negative except as noted.      Objective:      /68 (Patient Site: Left Arm, Patient Position: Sitting, Cuff Size: Adult Regular)  Pulse (!) 59  Resp 28  Ht 5' 6\" (1.676 m)  Wt 186 lb 11.2 oz (84.7 kg)  SpO2 100%  BMI 30.13 kg/m2    General appearance: alert, appears stated age and cooperative  Head: Normocephalic, without obvious abnormality, atraumatic  Eyes: conjunctivae clear, sclerae anicteric  Lungs: diminished bilaterally consistent with previous, but no wheeze, no crackles auscultated  Heart: regular rate and rhythm, S1, S2 normal, no murmur, click, rub or gallop  Extremities: 1+ pitting edema bilaterally to the knees, toes normal without obvious gouty inflammation  Neurologic: Grossly normal, oriented x3, moves all extremities, no tremor  Psychiatric: speech is slowed, as is thought process, affect is flat, mood is depressed and anxious          "

## 2021-06-18 NOTE — PROGRESS NOTES
[facility] Progress Note    Patient: Christian Rees  MRN: [id number]  Date of Service: 06/21/2018        Reason for Visit    Chief Complaint   Patient presents with     HE Cancer       Assessment and Plan  Lung cancer (H)    Staging form: Lung, AJCC 7th Edition    - Clinical: Stage IV (T1b, N3, M1b) - Signed by Erica Borrero CNP on 3/14/2016    1. Lung cancer, stage IV: pt with recent progression. Was on observation. He is overexpressed on PDL1 so we have started single agent Keytruda. He has received one cycle and tolerated well. He will return in 2 weeks for cycle 2. We will reimage after 2-3 cycles.     2. Cancer related pain, pt with bone mets: mainly in ribs and back. Does not feel like he has great control of pain so I will increase MS Contin to 10mg every 8 hours and then can still use oxycodone 5-10mg PRN. Refills of these given today. Educated on side effects of these medications and encouraged him to minimize use and only take with severe pain.     3. Anemia: likely from chronic disease and cancer. Overall stable. Only slightly symptomatic with fatigue. No shortness of breath/WILKINS. We will continue to monitor and if this decreases further, we will transfuse RBC's.     4. Elevated alk phos: likely from bone mets. Overall stable. Will monitor and adjust chemo if necessary. Other LFT's are normal.     ECOG Performance   ECOG Performance Status: 3     Distress Assessment  Distress Assessment Score: 4    Pain  Currently in Pain: Yes  Pain Score (Initial OR Reassessment): 6  Location: lower back, ribs: see above.         Problem List    1. Cancer related pain  oxyCODONE (ROXICODONE) 5 MG immediate release tablet    oxyCODONE (OXYCONTIN) 10 mg 12 hr tablet    DISCONTINUED: oxyCODONE (OXYCONTIN) 10 mg 12 hr tablet   2. Lung cancer metastatic to bone (H)  [facility] OFFICE VISIT LONG    Infusion Appointment   3. Low magnesium levels  [facility] OFFICE VISIT LONG    Infusion Appointment   4.  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 Today is cycle 1, Day 7.   Zometa    Past Treatment:   Alimta maintenance, 8 cycles completed in December 2016  Patient has completed 5 cycles of carboplatin and Alimta with dose reduction for recurrent and metastatic lung cancer;  last cycle in June 2016  Left lower lobectomy in April 2013. He then received 4 cycles of chemotherapy as part of the ECOG 1505 clinical trial. He also received maintenance Avastin through December 2013 on the same clinical trial, this was stopped because of hip surgery     Interim History:  Pt is here today for midcycle check. He states the Keytruda went fine. He felt tired for 2 days after, but other than that. No significant side effects. His biggest complaint is that he still is having significant pain. Mainly in right ribs and mid back. The pain medications help somewhat, but not enough. He would like better pain control. Is not having any adverse reaction to pain meds. Denies any coughing. Has slight shortness of breath, but this is not new.       Pain Status  Currently in Pain: Yes    Review of Systems    Constitutional  Constitutional (WDL): Exceptions to WDL  Fatigue: Fatigue not relieved by rest - Limiting instrumental ADL  Weight Loss: to <10% from baseline, intervention not indicated (down 6 lbs in a week)  Neurosensory  Neurosensory (WDL): Exceptions to WDL  Ataxia: Moderate symptoms, limiting instrumental ADL (using cane)  Eye   Eye Disorder (WDL): All eye disorder elements are within defined limits  Ear  Ear Disorder (WDL): All ear disorder elements are within defined limits  Cardiovascular  Cardiovascular (WDL): All cardiovascular elements are within defined limits  Pulmonary  Respiratory (WDL):  Exceptions to WDL  Cough: Mild symptoms, nonprescription intervention indicated  Dyspnea: Shortness of breath with minimal exertion, limiting instrumental ADL  Gastrointestinal  Gastrointestinal (WDL): Exceptions to WDL  Anorexia: Loss of appetite without alteration in eating habits  Genitourinary  Genitourinary (WDL): All genitourinary elements are within defined limits  Lymphatic  Lymph (WDL): All lymph disorder elements are within defined limits  Musculoskeletal and Connective Tissue  Musculoskeletal and Connetive Tissue Disorders (WDL): Exceptions to WDL  Muscle Weakness : Symptomatic, evident on physical exam, limiting instrumental ADL  Integumentary  Integumentary (WDL): All integumentary elements are within defined limits  Patient Coping  Patient Coping: Accepting  Distress Assessment  Distress Assessment Score: 4  Accompanied by  Accompanied by: Family Member  Oral Chemo Adherence       Past History  Past Medical History:   Diagnosis Date     Anxiety      Chronic back pain      Closed Posterior Dislocation Of The Hip     Created by Conversion      COPD (chronic obstructive pulmonary disease) (H) 03/20/2018     Depression      DVT (deep venous thrombosis) (H)      Gout      Heart failure (H)      Hypertension      Lung cancer (H)     Left lung     Persistent Atrial Fibrillation     Created by Conversion [facility] Annotation: Apr 17 2014 11:48AM - SheronIsmaelet: 3/14 new afib  with contr VR with hip replacement [id number] score of  3 with 1  point each for age 65 and older, HTN and CHF hx---new warfarin start        PHYSICAL EXAM:  /53  Pulse 72  Temp 97.3  F (36.3  C) (Oral)   Wt 179 lb 9.6 oz (81.5 kg)  SpO2 96%  BMI 28.99 kg/m2  GENERAL: no acute distress. Cooperative in conversation. Here with wife. In wheelchair  HEENT: pupils are equal, round and reactive. Oromucosa is clean and intact. No ulcerations or mucositis noted. No bleeding noted.  RESP: lungs are clear bilaterally per  auscultation. Regular respiratory rate. No wheezes or rhonchi.  CV: Regular, rate and rhythm. No murmurs.  ABD: soft, nontender. Positive bowel sounds. No organomegaly.   MUSCULOSKELETAL: No lower extremity swelling.   NEURO: non focal. Alert and oriented x3.   PSYCH: within normal limits. No depression or anxiety.  SKIN: warm dry intact   LYMPH: no cervical, supraclavicular lymphadenopathy      Lab Results    Recent Results (from the past 168 hour(s))   Comprehensive Metabolic Panel   Result Value Ref Range    Sodium 138 136 - 145 mmol/L    Potassium 4.2 3.5 - 5.0 mmol/L    Chloride 102 98 - 107 mmol/L    CO2 25 22 - 31 mmol/L    Anion Gap, Calculation 11 5 - 18 mmol/L    Glucose 109 70 - 125 mg/dL    BUN 17 8 - 28 mg/dL    Creatinine 1.17 0.70 - 1.30 mg/dL    GFR MDRD Af Amer >60 >60 mL/min/1.73m2    GFR MDRD Non Af Amer >60 >60 mL/min/1.73m2    Bilirubin, Total 0.5 0.0 - 1.0 mg/dL    Calcium 8.1 (L) 8.5 - 10.5 mg/dL    Protein, Total 5.9 (L) 6.0 - 8.0 g/dL    Albumin 2.7 (L) 3.5 - 5.0 g/dL    Alkaline Phosphatase 242 (H) 45 - 120 U/L    AST 19 0 - 40 U/L    ALT 10 0 - 45 U/L   HM1 (CBC with Diff)   Result Value Ref Range    WBC 9.2 4.0 - 11.0 thou/uL    RBC 2.49 (L) 4.40 - 6.20 mill/uL    Hemoglobin 7.9 (L) 14.0 - 18.0 g/dL    Hematocrit 24.8 (L) 40.0 - 54.0 %     80 - 100 fL    MCH 31.7 27.0 - 34.0 pg    MCHC 31.9 (L) 32.0 - 36.0 g/dL    RDW 13.8 11.0 - 14.5 %    Platelets 265 140 - 440 thou/uL    MPV 9.8 8.5 - 12.5 fL    Neutrophils % 73 (H) 50 - 70 %    Lymphocytes % 15 (L) 20 - 40 %    Monocytes % 7 2 - 10 %    Eosinophils % 5 0 - 6 %    Basophils % 0 0 - 2 %    Neutrophils Absolute 6.6 2.0 - 7.7 thou/uL    Lymphocytes Absolute 1.4 0.8 - 4.4 thou/uL    Monocytes Absolute 0.7 0.0 - 0.9 thou/uL    Eosinophils Absolute 0.5 (H) 0.0 - 0.4 thou/uL    Basophils Absolute 0.0 0.0 - 0.2 thou/uL       Imaging    Us Venous Legs Bilateral    Result Date: 6/4/2018  US VENOUS LEGS BILATERAL 6/4/2018 3:40 PM  INDICATION: Pain and swelling. TECHNIQUE: Routine exam with compression, augmentation, and duplex utilizing 2D gray-scale imaging, Doppler interrogation with color-flow and spectral waveform analysis. COMPARISON: 04/11/2018 FINDINGS: The common femoral, femoral, popliteal, and segmentally visualized calf veins were evaluated. Right leg veins are negative for deep venous thrombosis. Left leg veins are negative for deep venous thrombosis. No popliteal cysts.     CONCLUSION: Bilateral leg veins are negative for DVT.    Cta Chest Abdomen Pelvis    Result Date: 6/4/2018  CTA CHEST ABDOMEN PELVIS 6/4/2018 10:35 AM INDICATION: Dissection, aorta, abdominal right sided scapular/right chest pain. Hypotension. Evaluate for aortic dissection. TECHNIQUE: Multiphase helical acquisition through the chest, abdomen, and pelvis was performed including noncontrast, arterial phase, and delayed images before and after the administration of IV contrast. 2D and 3D reconstructions were performed by the CT technologist. Dose reduction techniques were used. IV CONTRAST: Iohexol (Omni) 75mL. COMPARISON: 03/28/2018, 04/11/2018. FINDINGS: CT ANGIOGRAM CHEST, ABDOMEN, AND PELVIS: No aortic dissection is seen. No pulmonary embolism is seen. There are diffuse atherosclerotic changes in the thoracic and abdominal aorta. There also are extensive coronary artery calcifications. There is calcification at the takeoff of the renal arteries, the superior mesenteric artery and celiac artery. CHEST: LUNGS AND PLEURA: There are small bilateral pleural effusions. There are multiple bilateral pulmonary nodules similar to the prior study and consistent with metastasis. A 9 mm nodule in the right upper lobe on image 36 is stable compared to the study from 04/11/2018. A 10 mm nodule adjacent to the minor fissure on image 58 measured 6 mm on the prior study. MEDIASTINUM: There are fairly extensive coronary calcifications. Mediastinal lymph nodes are within the  range of normal in size. ABDOMEN: There are multiple gallstones. Liver is low dense consistent with fatty infiltration. Spleen is normal in size. There are small enhancing nodular areas in the spleen. Some of this is due to the timing of the bolus. These may represent small hemangiomas, they can be seen on the prior study. The adrenals and pancreas appear unremarkable. Kidneys appear unremarkable. PELVIS: There is sigmoid diverticulosis. Artifact from hip prosthesis obscures detail. MUSCULOSKELETAL: The patient is osteopenic. There are bilateral total hip arthroplasties. There are diffuse degenerative changes in the spine. There is sclerosis in T8 and L4-L5 that is similar to the prior study. There is no sclerosis in the 4. There is a metastasis to the right lateral third rib that appears to be a change compared to the prior study from April 2018. There is a fracture of the right posterior fifth rib that is new as well as a metastasis.. There is a destructive lesion of the right lateral sixth rib consistent with a metastasis and similar to the prior study. There is a probable metastasis in the right seventh rib. There is a fracture of the posterolateral eighth rib that is new. There is a fracture of the right transverse process of T10 that is new. There is a fracture of the right posterior 11th rib that is new. There are multiple old rib fractures on the left     CONCLUSION: 1.  No aortic dissection is seen. No pulmonary embolism is seen. 2.  There are small bilateral pleural effusions. There are extensive coronary artery calcifications. 3.  There are multiple pulmonary nodules consistent with metastasis and similar to the study from 04/11/2018. One on the right near the minor fissure measuring 10 mm has increased in size from 6 mm on the prior study. 4.  Gallstones. 5.  Sigmoid diverticulosis. 6.  The patient has had a destructive metastasis visible in the right lateral sixth rib on prior studies. There now also  are metastasis visible in the third and seventh ribs. There are acute fractures of the right fifth, seventh, eighth and 11th ribs and  a fracture of the right transverse process of T10 7.  Sclerosis in T8 and L4-L5 is similar to the prior study. There is a new sclerotic lesion in T4        Signed by: Erica Borrero, CNP

## 2021-06-18 NOTE — PROGRESS NOTES
[facility] Hematology and Oncology Progress Note    Patient: Christian Rees  MRN: [id number]  Date of Service:         Reason for Visit    Chief Complaint   Patient presents with     HE Cancer     Malignant neoplasm of lower lobe of left lung        Assessment and Plan    Metastatic adenocarcinoma of the lung with lymph node and bone metastases diagnosed by biopsy in February 2016  Progression of lung cancer with new pulmonary nodules and new bone metastases diagnosed in June 2018  PDL 1 high expression with tumor proportion score of 70% and 2+ intensity; EGFR wild type, no ALK or ROS-1 rearrangement  Pain related to bone metastases  Initial diagnosis of lung cancer with left lower lobectomy in April 2013  Dyspnea on exertion  Hypertension  Anxiety/depression  Low magnesium  Pancytopenia with macrocytic anemia  Vitamin B12 deficiency    Discussed pain management and recommended that he take the OxyContin 10 mg every 12 hours on a fixed schedule.  He can use oxycodone 5 mg every 6 hours for breakthrough pain and Tylenol in between as needed as well.  Hopefully with this pain regimen and response to immunotherapy his pain will improve immediately.  Asked him to start stool softener and laxative if he notes constipation.    Reviewed his scans and discussed that his cancer has progressed.  There is still not a significant burden of disease.  His tumor over expresses PDL 1 so I recommended consideration for immunotherapy with Keytruda.  Discussed how this is administered intravenously 1 day every 3 weeks.  Reviewed potential side effects including but not limited to rash, fatigue, diarrhea and colitis, renal, hepatic and pulmonary dysfunction and thyroid dysfunction as well.  Explained that some patients could have good response and also stabilization of the cancer but this is not curative treatment.  Patient understands and is willing to proceed and did sign an informed consent.  We will start with first cycle today  and have him return in a week to 10 days for lab work and toxicity check.  Will plan reimaging after 2 cycles.    40 minutes spent majority in counseling and coordination of care.    Pain management as detailed above  Start Keytruda today  Follow-up in a week with lab work for toxicity check  Next treatment in 3 weeks along with Zometa which he initially received in the hospital  Restaging after 2 cycles      Measurable disease: CT of the chest/PET scan          Current therapy: Keytruda, first dose June 14, 2018  Zometa first dose June 5, 2018        Treatment history:   Alimta maintenance, 8 cycles completed in December 2016    Patient has completed 5 cycles of carboplatin and Alimta with dose reduction for recurrent and metastatic lung cancer;  last cycle in June 2016  Left lower lobectomy in April 2013   He then received 4 cycles of chemotherapy as part of the ECOG 1505 clinical trial   He also received maintenance Avastin through December 2013 on the same clinical trial, this was stopped because of hip surgery     Lung cancer    Staging form: Lung, AJCC 7th Edition      Clinical: Stage IV (T1b, N3, M1b) - Signed by Erica Borrero CNP on 3/14/2016    ECOG Performance   ECOG Performance Status: 3    Distress Assessment  Distress Assessment Score: No distress    Pain  Pain Score (Initial OR Reassessment): 10        Problem List    1. Lung cancer metastatic to bone (H)  CC OFFICE VISIT LONG    Infusion Appointment    HM1(CBC and Differential)    Comprehensive Metabolic Panel   2. Low magnesium levels  CC OFFICE VISIT LONG    Infusion Appointment   3. Lung cancer (H)  CC OFFICE VISIT LONG    Infusion Appointment        CC: Cristela De Jesus MD    ______________________________________________________________________________    History of Present Illness    Mr. Christian Rees returns for evaluation.  He was last seen in the clinic 3 months ago.  He was seen in the hospital last week and he was  admitted for new back pain with CT scan showing progression of his lung cancer with new pulmonary nodules, small bilateral pleural effusions and new bone metastases especially in the right-sided ribs.  He was discharged about 5 days ago.  Still having significant pain but has not really been taking pain medications as prescribed.  His fatigue with ECOG status of 2.  No headaches or dizziness.  Breathing is stable.  Poor appetite but weight is stable.  No issues with bowels or urine.  No bleeding or rash.    Pain Status  Currently in Pain: Yes    Review of Systems    Constitutional  Constitutional (WDL): Exceptions to WDL  Fatigue: Fatigue not relieved by rest, limiting self care ADL  Weight Loss: to <10% from baseline, intervention not indicated (7 lbs down since 3/2018.)  Neurosensory  Neurosensory (WDL): Exceptions to WDL  Cardiovascular  Cardiovascular (WDL): All cardiovascular elements are within defined limits  Pulmonary  Respiratory (WDL): Exceptions to WDL  Cough: Mild symptoms, nonprescription intervention indicated (Congested. )  Dyspnea: Shortness of breath with minimal exertion, limiting instrumental ADL  Gastrointestinal  Gastrointestinal (WDL): Exceptions to WDL  Anorexia: Loss of appetite without alteration in eating habits  Genitourinary  Genitourinary (WDL): All genitourinary elements are within defined limits  Integumentary  Integumentary (WDL): All integumentary elements are within defined limits  Patient Coping  Patient Coping: Accepting  Distress Assessment  Distress Assessment Score: No distress  Accompanied by  Accompanied by: Family Member    Past History  Past Medical History:   Diagnosis Date     Anxiety      Chronic back pain      Closed Posterior Dislocation Of The Hip     Created by Conversion      COPD (chronic obstructive pulmonary disease) (H) 03/20/2018     Depression      DVT (deep venous thrombosis) (H)      Gout      Heart failure (H)      Hypertension      Lung cancer (H)     Left  lung     Persistent Atrial Fibrillation     Created by [facility] Annotation: Apr 17 2014 11:48AM - Deedee Holbrook: 3/14 new afib  with contr VR with hip replacement [id number] score of  3 with 1  point each for age 65 and older, HTN and CHF hx---new warfarin start          Past Surgical History:   Procedure Laterality Date     LUNG SURGERY       [place] CARDIOVERSION ELECTIVE ARRHYTHMIA EXTERNAL      Description: Elective Cardioversion External;  Recorded: 05/16/2014;  Comments: 4/25/14     [place] REMOVAL OF LUNG,LOBECTOMY      Description: Lung Lobectomy;  Proc Date: 01/01/2013;  Comments: left lower     [place] TOTAL HIP ARTHROPLASTY Bilateral Left (2011), right (2012)     Description: Total Hip Replacement;  Recorded: 01/11/2013;  Comments: right 2003; ; left 2007 x2     SHOULDER SURGERY Left 2009?    rotator cuff repair       Physical Exam    Recent Vitals 6/14/2018   Height -   Weight 186 lbs   BSA (m2) -   /55   Pulse 68   Temp 97.6   Temp src 1   SpO2 98   Some recent data might be hidden       GENERAL: Alert and oriented. Seated comfortably. In no distress.    HEAD: Atraumatic and normocephalic.  Has a full head of hair.    EYES: JULIET, EOMI.  No pallor.  No icterus.    Oral cavity: no mucosal lesion or tonsillar enlargement.    NECK: supple. JVP normal.  No thyroid enlargement.    LYMPH NODES: No palpable, cervical, axillary or inguinal lymphadenopathy.    CHEST: clear to auscultation bilaterally.  Resonant to percussion throughout bilaterally.  Symmetrical breath movements bilaterally.    CVS: S1 and S2 are heard. Regular rate and rhythm.  No murmur or gallop or rub heard.    ABDOMEN: Soft. Not tender. Not distended.  No palpable hepatomegaly or splenomegaly.  No other mass palpable.  Bowel sounds heard.    EXTREMITIES: Warm.  No edema.    SKIN: no rash, or bruising or purpura.      CNS: Nonfocal        Lab Results    Recent Results (from the past 168 hour(s))   Potassium - Next AM   Result  Value Ref Range    Potassium 4.1 3.5 - 5.0 mmol/L   Magnesium   Result Value Ref Range    Magnesium 1.6 (L) 1.8 - 2.6 mg/dL   Creatinine   Result Value Ref Range    Creatinine 1.20 0.70 - 1.30 mg/dL    GFR MDRD Af Amer >60 >60 mL/min/1.73m2    GFR MDRD Non Af Amer 60 (L) >60 mL/min/1.73m2   Potassium   Result Value Ref Range    Potassium 3.9 3.5 - 5.0 mmol/L       Imaging    Us Venous Legs Bilateral    Result Date: 6/4/2018  US VENOUS LEGS BILATERAL 6/4/2018 3:40 PM INDICATION: Pain and swelling. TECHNIQUE: Routine exam with compression, augmentation, and duplex utilizing 2D gray-scale imaging, Doppler interrogation with color-flow and spectral waveform analysis. COMPARISON: 04/11/2018 FINDINGS: The common femoral, femoral, popliteal, and segmentally visualized calf veins were evaluated. Right leg veins are negative for deep venous thrombosis. Left leg veins are negative for deep venous thrombosis. No popliteal cysts.     CONCLUSION: Bilateral leg veins are negative for DVT.    Cta Chest Abdomen Pelvis    Result Date: 6/4/2018  CTA CHEST ABDOMEN PELVIS 6/4/2018 10:35 AM INDICATION: Dissection, aorta, abdominal right sided scapular/right chest pain. Hypotension. Evaluate for aortic dissection. TECHNIQUE: Multiphase helical acquisition through the chest, abdomen, and pelvis was performed including noncontrast, arterial phase, and delayed images before and after the administration of IV contrast. 2D and 3D reconstructions were performed by the CT technologist. Dose reduction techniques were used. IV CONTRAST: Iohexol (Omni) 75mL. COMPARISON: 03/28/2018, 04/11/2018. FINDINGS: CT ANGIOGRAM CHEST, ABDOMEN, AND PELVIS: No aortic dissection is seen. No pulmonary embolism is seen. There are diffuse atherosclerotic changes in the thoracic and abdominal aorta. There also are extensive coronary artery calcifications. There is calcification at the takeoff of the renal arteries, the superior mesenteric artery and celiac artery.  CHEST: LUNGS AND PLEURA: There are small bilateral pleural effusions. There are multiple bilateral pulmonary nodules similar to the prior study and consistent with metastasis. A 9 mm nodule in the right upper lobe on image 36 is stable compared to the study from 04/11/2018. A 10 mm nodule adjacent to the minor fissure on image 58 measured 6 mm on the prior study. MEDIASTINUM: There are fairly extensive coronary calcifications. Mediastinal lymph nodes are within the range of normal in size. ABDOMEN: There are multiple gallstones. Liver is low dense consistent with fatty infiltration. Spleen is normal in size. There are small enhancing nodular areas in the spleen. Some of this is due to the timing of the bolus. These may represent small hemangiomas, they can be seen on the prior study. The adrenals and pancreas appear unremarkable. Kidneys appear unremarkable. PELVIS: There is sigmoid diverticulosis. Artifact from hip prosthesis obscures detail. MUSCULOSKELETAL: The patient is osteopenic. There are bilateral total hip arthroplasties. There are diffuse degenerative changes in the spine. There is sclerosis in T8 and L4-L5 that is similar to the prior study. There is no sclerosis in the 4. There is a metastasis to the right lateral third rib that appears to be a change compared to the prior study from April 2018. There is a fracture of the right posterior fifth rib that is new as well as a metastasis.. There is a destructive lesion of the right lateral sixth rib consistent with a metastasis and similar to the prior study. There is a probable metastasis in the right seventh rib. There is a fracture of the posterolateral eighth rib that is new. There is a fracture of the right transverse process of T10 that is new. There is a fracture of the right posterior 11th rib that is new. There are multiple old rib fractures on the left     CONCLUSION: 1.  No aortic dissection is seen. No pulmonary embolism is seen. 2.  There are  small bilateral pleural effusions. There are extensive coronary artery calcifications. 3.  There are multiple pulmonary nodules consistent with metastasis and similar to the study from 04/11/2018. One on the right near the minor fissure measuring 10 mm has increased in size from 6 mm on the prior study. 4.  Gallstones. 5.  Sigmoid diverticulosis. 6.  The patient has had a destructive metastasis visible in the right lateral sixth rib on prior studies. There now also are metastasis visible in the third and seventh ribs. There are acute fractures of the right fifth, seventh, eighth and 11th ribs and  a fracture of the right transverse process of T10 7.  Sclerosis in T8 and L4-L5 is similar to the prior study. There is a new sclerotic lesion in T4        Signed by: Jag Li MD

## 2021-06-18 NOTE — PROGRESS NOTES
Pt came into infusion clinic for Day 1, Cycle 1 of his treatment. Labs Reviewed. Medications explained to pt who verbalized understanding. IV patent throughout infusion. Pt tolerated infusion with no complications. Pt left infusion clinic via WC accompanied by spouse. Pt will RTC as sched.

## 2021-06-18 NOTE — PROGRESS NOTES
Called and talked to [facility] and they stated they did not receive Trelegy tearing exception form. New tearing exception stated over the phone. Will wait for response.

## 2021-06-18 NOTE — PROGRESS NOTES
"Call from patient's care manger at [facility] asking if we could file a \"Tier Exception\" for this patient to see if he could get his inhalers (Trelegy and ventolin) at lower cost.  Paperwork for Tier Exception faxed to [facility] at [phone]  "

## 2021-06-19 NOTE — PROGRESS NOTES
Patient had MRI of the lumbar spine today which shows large left paraspinal hematoma.  This probably explains his recent drop in hemoglobin.  His CBC on Monday was stable.  Will plan to recheck CBC INR and PTT tomorrow.  Will hold aspirin for now.  I think GI evaluation can probably be canceled.  He will have repeat lab work next week, August 14 when he seen in the clinic as well.

## 2021-06-19 NOTE — PROGRESS NOTES
Pt here to see NP.  Pt to restart chemotherapy and received B12 injection today in L arm without incident.  Pt d/c in wheel chair to lobby to meet his wife.  Pt currently staying at a TCU.

## 2021-06-19 NOTE — PROGRESS NOTES
Assessment/Plan:       1. Pneumonia of right lower lobe due to infectious organism (H)  Completing course of Levaquin.  WBC normalized at time of d/c.  Sputum culture returned negative.  No side effects from Levaquin that patient has noticed.  No cough, no fevers.    2. Stage 4 malignant neoplasm of lung, unspecified laterality (H)  Following with Dr. Li from  [facility].  Currently undergoing chemo, has had 1 course.  May need to delay 2nd course if too ill.  Has follow-up scheduled.    3. Cancer associated pain  Well-controlled currently with the exception of some lower back discomfort.  Patient and his wife remain unclear about his pain medication regimen, apparently his daughter-in-law and home nurse help with setting up pills.  Discussed keeping PRN oxycodone on top of a notepad and if he takes this, write down the dose and time on the pad.  (Have discussed this at previous visits as well, and it does not sound like patient followed through with this recommendation.)    4. Generalized weakness  Patient has home therapies in place.    5. Generalized anxiety disorder  Currently stable on Paxil and clonazepam.  Has not tried Remeron, which was previously recommended in the hospital.  Will consider in future if appetite decrease and trouble sleeping.    6. Obstructive sleep apnea syndrome  Patient continues to be non-compliant with his CPAP, says he recently obtained some supplies that he needed.  He vows to be compliant moving forward.        Subjective:       Christian Rees is a 68 y.o. male who presents for hospital follow-up.  He was hospitalized from 6/25-6/27/18 with shortness of breath.  This was ultimately thought to be multifactorial, from suspected HCAP, a possible CHF exacerbation, COPD and anemia secondary to chemotherapy.  Patient received 2 units of PRBCs, antibiotics for pneumonia, and gentle diuresis with his blood with quick resolution of his hypoxia.  He was discharged on room  air.  He never complained of fevers or cough.  He reports his pain is decently well controlled, with the exception of one particular area of his right lower back.  This pain does not radiate.  He continues to feel weak in general.  He also continues with a fair amount of anxiety.  He denies chest pain, ongoing shortness of breath or increased lower extremity edema.      Labs Reviewed:  6/27/18:  Na 138, K 3.4, Cl 103, CO2 25, BUN 24, Cr 1.20, Gluc 110  6/27/18: WBC 5.2, Hgb 8.8, Plt 143    Imaging Reviewed:  CTA chest PE run 6/25/18:   1.  No PE.  2.  Pulmonary nodules are again seen throughout the lungs. There is only minimal increase in groundglass opacity in the right lower lobe, which may be an inflammatory or infectious process.  3.  Multiple bone metastases with pathologic fractures are again seen.    The following portions of the patient's history were reviewed and updated as appropriate: allergies, current medications, past medical history, past social history, past surgical history and problem list.      Current Outpatient Prescriptions:      acetaminophen (TYLENOL) 500 MG tablet, Take 2 tablets (1,000 mg total) by mouth 3 (three) times a day. (Patient taking differently: Take 1,000 mg by mouth 3 (three) times a day as needed. ), Disp: , Rfl: 0     albuterol (PROAIR HFA;PROVENTIL HFA;VENTOLIN HFA) 90 mcg/actuation inhaler, Inhale 2 puffs every 6 (six) hours as needed for wheezing., Disp: 1 each, Rfl: 3     aspirin 81 MG EC tablet, Take 81 mg by mouth daily., Disp: , Rfl:      calcium-vitamin D 500 mg(1,250mg) -200 unit per tablet, Take 1 tablet by mouth daily., Disp: , Rfl: 0     carvedilol (COREG) 6.25 MG tablet, Take 1 tablet (6.25 mg total) by mouth 2 (two) times a day with meals., Disp: 60 tablet, Rfl: 0     clonazePAM (KLONOPIN) 0.5 MG tablet, Take 1 tablet (0.5 mg total) by mouth 2 (two) times a day., Disp: 60 tablet, Rfl: 0     cyanocobalamin 1,000 mcg/mL injection, Inject 1,000 mcg into the  shoulder, thigh, or buttocks every 30 (thirty) days., Disp: , Rfl:      fluticasone-umeclidinium-vilanterol (TRELEGY ELLIPTA) 100-62.5-25 mcg DsDv inhaler, Inhale 1 Inhalation daily., Disp: 1 each, Rfl: 12     folic acid (FOLVITE) 1 MG tablet, TAKE ONE TABLET BY MOUTH EVERY DAY, Disp: 100 tablet, Rfl: 3     furosemide (LASIX) 40 MG tablet, TAKE ONE TABLET BY MOUTH EVERY DAY, Disp: 90 tablet, Rfl: 3     gabapentin (NEURONTIN) 100 MG capsule, Take 100 mg by mouth 3 (three) times a day., Disp: 90 capsule, Rfl: 0     levoFLOXacin (LEVAQUIN) 500 MG tablet, Take 1 tablet (500 mg total) by mouth daily for 5 days., Disp: 5 tablet, Rfl: 0     magnesium oxide (MAGOX) 400 mg tablet, 400mg daily, Disp: 90 tablet, Rfl: 3     morphine (MS CONTIN) 15 MG 12 hr tablet, Take 1 tablet (15 mg total) by mouth 2 (two) times a day., Disp: 30 tablet, Rfl: 0     multivitamin (MULTIVITAMIN) per tablet, Take 1 tablet by mouth daily., Disp: , Rfl:      omeprazole (PRILOSEC) 20 MG capsule, Take 20 mg by mouth daily before breakfast. , Disp: , Rfl:      oxyCODONE (ROXICODONE) 5 MG immediate release tablet, Take 1-2 tablets (5-10 mg total) by mouth every 6 (six) hours as needed., Disp: 90 tablet, Rfl: 0     PARoxetine (PAXIL) 30 MG tablet, Take 1 tablet (30 mg total) by mouth every morning., Disp: , Rfl:      potassium chloride (KLOR-CON) 10 MEQ CR tablet, Take 4 tablets (40 mEq total) by mouth daily for 3 days., Disp: 12 tablet, Rfl: 0    Review of Systems   A 12 point comprehensive review of systems was negative except as noted.      Objective:      /64 (Patient Site: Right Arm, Patient Position: Sitting, Cuff Size: Adult Regular)  Pulse 66  Resp 18    General appearance: alert, appears stated age and cooperative  Head: Normocephalic, without obvious abnormality, atraumatic  Eyes: conjunctivae clear, sclerae anicteric  Lungs: diminished breath sounds bilaterally consistent with previous, no rhonchi or rales, no wheeze  Heart: regular  rate and rhythm, S1, S2 normal, no murmur, click, rub or gallop  Extremities: 1+ pitting edema to the knees bilaterally; no erythema or warmth  Neurologic: Grossly normal, alert and oriented x3, speech slightly slowed, recent memory moderately impaired  Psychiatric: affect is flat and depressed, mood is anxious and depressed

## 2021-06-19 NOTE — PROGRESS NOTES
[facility] Hematology and Oncology Progress Note    Patient: Christian Rees  MRN: [id number]  Date of Service: 08/15/18          Reason for Visit    Chief Complaint   Patient presents with     HE Cancer     Lung Cancer       Assessment and Plan  Malignant neoplasm metastatic to lung, unspecified laterality (H)    Staging form: Lung, AJCC 7th Edition    - Clinical: Stage IV (T1b, N3, M1b) - Signed by Erica Borrero CNP on 3/14/2016    1. Lung cancer, stage IV: pt with recent progression. Was on observation. He is overexpressed on PDL1 so we started single agent Keytruda.  Has received 2 cycles but unfortunately he has been in the hospital after both cycles as well as other times.  At this time I do not think he can continue this treatment as he is obviously not tolerating it and his performance status is borderline.  Patient is hoping to improve that.  He would like to try other treatment.  We will plan on starting him back on single agent Alimta.  He took that for a long time in 2016 and tolerated it pretty well.  It was still working at that time as well.  He has started vitamin B12 and folic acid. He will return next week to start chemo. Consent signed. He still understands this is incurable and has palliative intent.     2. Cancer related pain, pt with bone mets: His MRI does show significant stenosis, which can explain a lot of the hip pain. He is currnetly managed well with morphine twice a day and oxycodone for breakthrough pain.  He states the last 2 days he actually has not needed much breakthrough pain at all and is feeling pretty good.  Did tell him that if he does continue to have significant hip pain we may send him to [facility] spine center to discuss if there is anything else that can help the pain whether it is physical therapy or injections.    3. Anemia: His MRI actually also showed a very large hematoma in the left paraspinal area.  Unclear what causes but he does have a significant  bruise there.  We did check his INR and PTT last week as well as today and both of those have been normal for the most part.  Unclear as to why he would have a significant hematoma without any trauma.  He states he was getting physical therapy and was doing a pool therapy with a belt around his waist but he does not remember any trauma or it being too tight.  His hemoglobin is stable today from last week.  He did get a blood transfusion in the hospital couple weeks ago.  We will continue to follow his labs with his chemo.    4. Elevated alk phos: likely from bone mets. Overall stable. Will monitor and adjust chemo if necessary. Other LFT's are normal.     ECOG Performance   ECOG Performance Status: 2     Distress Assessment  Distress Assessment Score: 4    Pain  Currently in Pain: Yes  Pain Score (Initial OR Reassessment): 4  Location: back: see above.         Problem List    1. Lung cancer (H)     2. Cancer associated pain     3. Encounter for antineoplastic chemotherapy  CC OFFICE VISIT LONG    Infusion Appointment    CC OFFICE VISIT LONG    Infusion Appointment   4. Hypomagnesemia  CC OFFICE VISIT LONG    Infusion Appointment    CC OFFICE VISIT LONG    Infusion Appointment   5. Malignant neoplasm metastatic to lung, unspecified laterality (H)  CC OFFICE VISIT LONG    Infusion Appointment    CC OFFICE VISIT LONG    Infusion Appointment   6. Stage 4 malignant neoplasm of lung, unspecified laterality (H)  CC OFFICE VISIT LONG    Infusion Appointment   7. Hematoma of abdominal wall     8. Anemia, chronic disease     9. Bone metastasis (H)        ______________________________________________________________________________    History of Present Illness    Measurable Disease: Ct scan, PET scan    Treatment: started Keytruda.  2 cycles received on 6/15/18 and 7/10/18 .  He was in the hospital after both cycles.  Zometa    Past Treatment:   Alimta maintenance, 8 cycles completed in December 2016  Patient has completed 5  cycles of carboplatin and Alimta with dose reduction for recurrent and metastatic lung cancer;  last cycle in June 2016  Left lower lobectomy in April 2013. He then received 4 cycles of chemotherapy as part of the ECOG 1505 clinical trial. He also received maintenance Avastin through December 2013 on the same clinical trial, this was stopped because of hip surgery     Interim History:  Is here today to evaluate his performance status and to see he is ready for treatment.  States that he is slowly improving.  He is actually not at the TCU anymore and is at home.  He is using a walker instead of a wheelchair.  He still is fairly tired.  He still gets pretty short of breath with activity.  She does feel better when he does wear oxygen.  Does state that his pain seems to be a little bit better for the last 2 days.  He is taking morphine twice a day and oxycodone for breakthrough but he has not needed much breakthrough the last 2 days.  So he is happy about that.  He states it is tolerable.  He wants to get started on chemo as soon as he can.      Pain Status  Currently in Pain: Yes    Review of Systems    Constitutional  Constitutional (WDL): Exceptions to WDL  Fatigue: Fatigue relieved by rest  Neurosensory  Neurosensory (WDL): Exceptions to WDL  Ataxia: Moderate symptoms, limiting instrumental ADL (uses a walker)  Eye   Eye Disorder (WDL): All eye disorder elements are within defined limits  Ear  Ear Disorder (WDL): All ear disorder elements are within defined limits  Cardiovascular  Cardiovascular (WDL): Exceptions to WDL  Edema: Yes (wears compression stockings/ lasix)  Pulmonary  Respiratory (WDL): Exceptions to WDL  Cough: Mild symptoms, nonprescription intervention indicated (phlem/ greyish/green)  Dyspnea: Shortness of breath with minimal exertion, limiting instrumental ADL  Hypoxia: Decreased oxygen saturation with exercise (e.g., pulse oximeter <88%), intermittent supplemental oxygen (on  2L)  Gastrointestinal  Gastrointestinal (WDL): All gastrointestinal elements are within defined limits  Genitourinary  Genitourinary (WDL): All genitourinary elements are within defined limits  Lymphatic  Lymph (WDL): All lymph disorder elements are within defined limits  Musculoskeletal and Connective Tissue  Musculoskeletal and Connetive Tissue Disorders (WDL): Exceptions to WDL  Muscle Weakness : Symptomatic, evident on physical exam, limiting instrumental ADL  Myalgia: Moderate pain, limiting instrumental ADL (back)  Integumentary  Integumentary (WDL): All integumentary elements are within defined limits  Patient Coping  Patient Coping: Accepting  Distress Assessment  Distress Assessment Score: 4  Accompanied by  Accompanied by: Family Member  Oral Chemo Adherence       Past History  Past Medical History:   Diagnosis Date     Anxiety      Chronic back pain      Closed Posterior Dislocation Of The Hip     Created by Conversion      COPD (chronic obstructive pulmonary disease) (H) 03/20/2018     Depression      DVT (deep venous thrombosis) (H)      Gout      Heart failure (H)      Hypertension      Lung cancer (H)     Left lung     Persistent Atrial Fibrillation     Created by Conversion [facility] Annotation: Apr 17 2014 11:48AM - Ismael Holbrooket: 3/14 new afib  with contr VR with hip replacement [id number] score of  3 with 1  point each for age 65 and older, HTN and CHF hx---new warfarin start        PHYSICAL EXAM:  /82  Pulse 89  Temp 98.1  F (36.7  C) (Oral)   Wt 179 lb 6.4 oz (81.4 kg)  SpO2 98%  BMI 28.1 kg/m2  GENERAL: no acute distress. Cooperative in conversation. Here with wife. In wheelchair  HEENT: pupils are equal, round and reactive. Oromucosa is clean and intact. No ulcerations or mucositis noted. No bleeding noted.  RESP: lungs are clear bilaterally per auscultation. Regular respiratory rate. No wheezes or rhonchi.  CV: Regular, rate and rhythm. No murmurs.  ABD: soft,  nontender. Positive bowel sounds. No organomegaly.   MUSCULOSKELETAL: bilateral ankle swelling. Left slightly greater than right, which is typical for him  NEURO: non focal. Alert and oriented x3.   PSYCH: within normal limits. No depression or anxiety.  SKIN: warm dry intact   LYMPH: no cervical, supraclavicular lymphadenopathy      Lab Results  Recent Results (from the past 24 hour(s))   Comprehensive Metabolic Panel   Result Value Ref Range    Sodium 142 136 - 145 mmol/L    Potassium 4.5 3.5 - 5.0 mmol/L    Chloride 104 98 - 107 mmol/L    CO2 29 22 - 31 mmol/L    Anion Gap, Calculation 9 5 - 18 mmol/L    Glucose 176 (H) 70 - 125 mg/dL    BUN 18 8 - 28 mg/dL    Creatinine 0.83 0.70 - 1.30 mg/dL    GFR MDRD Af Amer >60 >60 mL/min/1.73m2    GFR MDRD Non Af Amer >60 >60 mL/min/1.73m2    Bilirubin, Total 0.8 0.0 - 1.0 mg/dL    Calcium 7.8 (L) 8.5 - 10.5 mg/dL    Protein, Total 5.7 (L) 6.0 - 8.0 g/dL    Albumin 3.0 (L) 3.5 - 5.0 g/dL    Alkaline Phosphatase 199 (H) 45 - 120 U/L    AST 11 0 - 40 U/L    ALT <9 0 - 45 U/L   HM1 (CBC with Diff)   Result Value Ref Range    WBC 20.4 (H) 4.0 - 11.0 thou/uL    RBC 2.70 (L) 4.40 - 6.20 mill/uL    Hemoglobin 8.4 (L) 14.0 - 18.0 g/dL    Hematocrit 27.6 (L) 40.0 - 54.0 %     (H) 80 - 100 fL    MCH 31.1 27.0 - 34.0 pg    MCHC 30.4 (L) 32.0 - 36.0 g/dL    RDW 18.4 (H) 11.0 - 14.5 %    Platelets 163 140 - 440 thou/uL    MPV 9.8 8.5 - 12.5 fL    Neutrophils % 90 (H) 50 - 70 %    Lymphocytes % 5 (L) 20 - 40 %    Monocytes % 3 2 - 10 %    Eosinophils % 2 0 - 6 %    Basophils % 0 0 - 2 %    Neutrophils Absolute 17.8 (H) 2.0 - 7.7 thou/uL    Lymphocytes Absolute 1.0 0.8 - 4.4 thou/uL    Monocytes Absolute 0.6 0.0 - 0.9 thou/uL    Eosinophils Absolute 0.4 0.0 - 0.4 thou/uL    Basophils Absolute 0.1 0.0 - 0.2 thou/uL   APTT(PTT)   Result Value Ref Range    PTT 21 (L) 24 - 37 seconds   INR   Result Value Ref Range    INR 1.08 0.90 - 1.10         Imaging    Xr Chest 1 View  Portable    Result Date: 7/16/2018  XR CHEST 1 VIEW PORTABLE 7/16/2018 11:04 AM INDICATION: Sob COMPARISON: CT of the chest 06/30/2018. FINDINGS: Persistent patchy infiltrate of the mid to lower right lung. Small left pleural effusion. Normal heart size and pulmonary vascularity. Multiple bilateral metastatic pulmonary nodules and rib lesions again demonstrated. Pathologic fractures of right-sided ribs. High riding position of both humeral heads consistent with full-thickness rotator cuff tears.    Xr Hip Right 2 Or More Vws    Result Date: 7/20/2018  XR HIP RIGHT 2 OR MORE VWS 7/20/2018 5:30 PM INDICATION: Right hip pain COMPARISON: 7/4/2018 left hip FINDINGS: Postop change bilateral total hip arthroplasties. Pelvis and right hip otherwise negative. Right hip arthroplasty appears well seated and well aligned. No dislocations. No fractures.    Ct Chest Without Contrast    Result Date: 7/18/2018  CT CHEST WO CONTRAST 7/18/2018 4:16 PM INDICATION: Dyspnea chronic, had xray sob, right lung infiltrates TECHNIQUE: Routine chest. Dose reduction techniques were used. IV CONTRAST: None COMPARISON: Chest x-ray 07/16/2018, CT chest exams 06/30/2018, 07/11/2017 and 02/09/2016 FINDINGS: LUNGS AND PLEURA: The central airways are clear. Increased small bilateral pleural effusions with adjacent compressive atelectasis. New small amount of consolidation within the dependent portion of the right upper lobe with adjacent airspace opacities. Multiple solid bilateral pulmonary nodules without significant interval change in size or number since the comparison CT chest exam. A dominant right upper lobe subpleural nodule image 57 series 3 measures a stable 1.0 cm. Another dominant right upper lobe subpleural nodule measures stable 9 mm image 32. MEDIASTINUM: Mild chest wall edema. Stable anterior pericardial lymph node measuring 7 mm in short axis dimension. Extensive coronary artery calcifications. Mild dilatation of the central  pulmonary arteries suggesting pulmonary artery hypertension. LIMITED UPPER ABDOMEN: Cholelithiasis. MUSCULOSKELETAL: Numerous lytic and sclerotic metastases within the thoracic spine are without significant interval change. Stable presumed pathologic compression deformity involving the T8 vertebral body. Multiple bilateral presumably pathologic healing  rib fractures are stable.     CONCLUSION: 1.  New small amount of right upper lobar dependent consolidation with adjacent airspace opacities likely reflecting pneumonic infiltrates. 2.  Increased small bilateral pleural effusions with adjacent compressive atelectasis. 3.  Stable pulmonary and skeletal metastases. 4.  Stable bilateral healing rib fractures as well as a stable mild likely pathologic compression deformity of the T8 vertebral body.    Mr Lumbar Spine With Without Contrast    Result Date: 8/8/2018  [facility] MR LUMBAR SPINE WITHOUT AND WITH CONTRAST 08/08/2018, 10:12 AM INDICATION: Known metastatic cancer. Worsening right hip pain. TECHNIQUE: Without and with IV contrast. CONTRAST: Gadavist 10 mL. COMPARISON: CT of the abdomen and pelvis 07/06/2018. FINDINGS: Nomenclature is based on five lumbar-type vertebral bodies. Diffuse bony metastases. Stable mild T12-L2 vertebral body height loss. No pars defect. The conus tip is identified at T12-L1. Epidural lipomatosis. Large left thoracolumbar paraspinal  hematoma measuring 37.0 x 61.0 mm in AP by transverse dimensions. It measures at least 142.6 mm in craniocaudal dimension. It extends cranially beyond the field-of-view. No abdominal aortic aneurysm. Diffuse bony metastases. Prosthetic hips. T12-L1: Mild disc height and T2 signal loss. Mild posterior annular bulge. Mild bilateral facet arthropathy. No spinal canal stenosis. No right neural foraminal stenosis. No left neural foraminal stenosis. L1-L2: Degenerative T2 prolongation in the disc. Severe disc height loss. Mild circumferential disc  osteophyte complex. Mild bilateral facet arthropathy. Mild left ventral spinal canal stenosis. No right neural foraminal stenosis. No left neural foraminal stenosis. L2-L3: 4 mm posterior subluxation. Degenerative T2 prolongation in the disc. Severe disc height loss. Mild to moderate bilateral facet arthropathy. Severe spinal canal stenosis. Mild right neural foraminal stenosis. Mild to moderate left neural foraminal  stenosis. L3-L4: Degenerative T2 prolongation in the disc. Severe disc height loss. Mild to moderate circumferential disc osteophyte complex. Moderate bilateral facet arthropathy. Severe spinal canal stenosis. Severe right neural foraminal stenosis. Mild left neural foraminal stenosis. L4-L5: Severe disc height and T2 signal loss. Mild circumferential disc osteophyte complex. Moderate bilateral facet arthropathy. Moderate to severe spinal canal stenosis. Mild right neural foraminal stenosis. Mild left neural foraminal stenosis. L5-S1: Severe disc height and T2 signal loss. Mild circumferential disc osteophyte complex. Moderate bilateral facet arthropathy. Mild left lateral recess stenosis. No central spinal canal stenosis. No right neural foraminal stenosis. No left neural foraminal stenosis.     CONCLUSION: 1.  Large left thoracolumbar paraspinal hematoma. 2.  Diffuse bony metastases. 3.  Severe right L3-L4 neural foraminal stenosis. This correlates with the history of right hip pain. 4.  High-grade L2-L3, L3-L4 and L4-L5 spinal canal stenoses in part due to epidural lipomatosis. 5.  Findings were communicated to Dr. Borrero at 10:45 AM hours on 08/08/2018.     Mr Pelvis With Without Contrast    Result Date: 8/9/2018  MRI PELVIS WITHOUT AND WITH IV CONTRAST 8/8/2018 12:07 PM INDICATION: Worsening pain in hip/thigh, has metastatic lung cancer worsening pain in hip/thigh, has metastatic lung cancer. TECHNIQUE: Routine MRI pelvis without and with IV contrast. Axial, sagittal, and coronal high-resolution  T2 and post gadolinium T1 with fat saturation. IV CONTRAST: Gadavist 10 mL. COMPARISON: 7/20/2018 plain films. FINDINGS: There are innumerable skeletal metastatic lesions throughout the visualized portions of the lower lumbar spine and pelvis, see for example series 5 image 13, series 4 image 12. No definite evidence of metastatic lesions in either proximal femur. Right total hip arthroplasty. Left total hip arthroplasty with cerclage wire fixation proximal femoral shaft. Mild global atrophy of the pelvic and proximal thigh musculature. No evidence of retroperitoneal or inguinal adenopathy. Pelvic intraperitoneal contents normal. Small seroma along the superficial margin of the right greater trochanter. Mild edema within both psoas muscles.     CONCLUSION: 1.  Innumerable metastatic lesions throughout the lower lumbar spine and pelvis. 2.  No evidence of a fracture. 3.  Bilateral total hip arthroplasties.     Ir Port Placement 5+ Years    Result Date: 8/8/2018  [facility] PROCEDURE: IMPLANTABLE VENOUS PORT PLACEMENT 1.  Implantable venous access port placement. 2.  Ultrasound guidance for vascular access. A permanent image was stored. 3.  Fluoroscopic guidance for central venous access device placement. INTERVENTIONAL RADIOLOGIST: Malinda Owen MD. INDICATION: 68-year-old male with lung cancer, plan for port placement CONSENT: The risks, benefits and alternatives of chest port placement were discussed with the patient  in detail. All questions were answered. Informed consent was given to proceed with the procedure. MODERATE SEDATION: Versed 1 mg IV; Fentanyl 50 mcg IV. During the time out, immediately prior to the administration of medications, the patient was reassessed for adequacy to receive conscious sedation.  Under physician supervision, Versed and fentanyl were administered for moderate sedation. Pulse oximetry, heart rate and blood pressure were continuously monitored by an independent trained  observer. The physician spent 30 minutes of face-to-face sedation time with the patient. ANTIBIOTICS: Ancef 2 grams IV. ADDITIONAL MEDICATIONS: None. FLUOROSCOPIC TIME: 0.5 minutes. AIR KERMA:  4 mGy. CONTRAST: None. COMPLICATIONS: No immediate complications. UNIVERSAL PRECAUTIONS: The procedure was performed using maximum sterile barrier technique. The interventionalist and assistants performed appropriate hand hygiene and wore a hat, mask, sterile gown, and sterile gloves during the entire procedure. PROCEDURE:  Using real-time ultrasound guidance the right internal jugular vein was accessed. A subcutaneous pocket was created and irrigated with sterile normal saline. The catheter tubing was tunneled in an antegrade fashion from the port pocket to the dermatotomy  site. Over a guidewire, a peel-away sheath was advanced with fluoroscopic monitoring. Through the peel-away sheath, the catheter was advanced until the tip was at the cavoatrial junction. The catheter was cut to length and attached firmly to the port. The port pocket incision was closed with layered absorbable suture and surgical glue. The dermatotomy site was closed with surgical glue. FINDINGS: Ultrasound shows an anechoic and compressible jugular vein. At the completion of the study, the port tip lies at the cavoatrial junction.     Successful power-injectable venous port placement. CPT codes for physician reference only: 49622 [id number] x1    Us Venous Legs Bilateral    Result Date: 7/16/2018  [facility] VENOUS LEGS BILATERAL 7/16/2018 5:07 PM INDICATION: Pain and swelling. TECHNIQUE: Routine with compression, augmentation, and duplex. Gray-scale and duplex including color and spectral imaging. COMPARISON: 06/04/2018 FINDINGS: The  right and left common femoral, greater saphenous, femoral vein in the thigh, popliteal, and segmentally visualized calf veins are fully compressible and demonstrate normal flow which augments with  compression. Notable increase in the amount of subcutaneous edema about the ankles and calf.     1.  No evidence of DVT in  either leg. 2.  Notable increase in the amount of subcutaneous edema below the knees bilaterally.        Signed by: Erica Borrero, CNP

## 2021-06-19 NOTE — PROGRESS NOTES
Pt ambulatory with walker and family to radiation clinic for initial palliative care consult. VSS, has back pain. Further recommendations and orders per palliative care team.

## 2021-06-19 NOTE — PROGRESS NOTES
[facility]      Facility:    [facility] [[id number]]  Code Status: UNKNOWN      Chief Complaint/Reason for Visit:  Chief Complaint   Patient presents with     H & P     Metastatic lung cancer, we did readmit to the hospital for low hemoglobin, pain management, transaminitis, ecchymosis with spontaneous bruising.       HPI:   Christian is a 68 y.o. male who was residing here at the [facility] transitional care undergoing physical and occupational therapy and med monitoring for multiple medical problems he does have metastatic lung cancer and pain was a huge issue.  We did go up on his oxycodone as well as his morphine.  He does have an allergy listed to OxyContin however he is on that medication has had no bad reactions.  He was sent to the hospital last week secondary increased bruising and a hemoglobin of 6.5 he has had blood transfusions in the past and it was likely he had some kind of a slow GI bleed.  He does have metastatic lung cancer with metastases to the bone and likely the liver.  He is on oxygen at this time and has developed a new cough.  He is treated appropriately and transferred here to the TCU in stable condition.    Patient claims he has a cough however he claims his pain is well-managed.  We are getting making some progress on his pain is rather comfortable.  He does have a cough which is productive but no hemoptysis.  He is somewhat short of breath and is on 2 L of oxygen.  He denies any other issues.    Past Medical History:  Past Medical History:   Diagnosis Date     Anxiety      Chronic back pain      Closed Posterior Dislocation Of The Hip     Created by Conversion      COPD (chronic obstructive pulmonary disease) (H) 03/20/2018     Depression      DVT (deep venous thrombosis) (H)      Gout      Heart failure (H)      Hypertension      Lung cancer (H)     Left lung     Persistent Atrial Fibrillation     Created by [employer] [facility]  Annotation: Apr 17 2014 11:48AM - Deedee Holbrook: 3/14 new afib  with contr VR with hip replacement [id number] score of  3 with 1  point each for age 65 and older, HTN and CHF hx---new warfarin start            Surgical History:  Past Surgical History:   Procedure Laterality Date     LUNG SURGERY       SC CARDIOVERSION ELECTIVE ARRHYTHMIA EXTERNAL      Description: Elective Cardioversion External;  Recorded: 05/16/2014;  Comments: 4/25/14     SC REMOVAL OF LUNG,LOBECTOMY      Description: Lung Lobectomy;  Proc Date: 01/01/2013;  Comments: left lower     SC TOTAL HIP ARTHROPLASTY Bilateral Left (2011), right (2012)     Description: Total Hip Replacement;  Recorded: 01/11/2013;  Comments: right 2003; ; left 2007 x2     SHOULDER SURGERY Left 2009?    rotator cuff repair       Family History:   Family History   Problem Relation Age of Onset     Cancer Mother      breast and lung     Hypertension Father      No Medical Problems Son      Diabetes Neg Hx      Heart disease Neg Hx      Colon cancer Neg Hx      Prostate cancer Neg Hx        Social History:    Social History     Social History     Marital status:      Spouse name: N/A     Number of children: N/A     Years of education: N/A     Social History Main Topics     Smoking status: Former Smoker     Packs/day: 1.00     Years: 40.00     Quit date: 1/1/2013     Smokeless tobacco: Never Used     Alcohol use No     Drug use: No     Sexual activity: No     Other Topics Concern     None     Social History Narrative    .  1 child.  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          Review of Systems   Constitutional:        Review of systems positive for congestive cough with productive sputum clear productive sputum.  He also does have some pain but he claims is well managed when he gets his medications and seems like his pain is under control intolerable.  He denies any fevers chills nausea vomiting diarrhea change in vision hearing taste or  smell weakness one side or the other chest pain.  He is short of breath pretty continuously.  The remainder the review of systems is negative.       Vitals:    08/06/18 1021   BP: (!) 174/92   Pulse: (!) 101   Resp: 24   Temp: 98.5  F (36.9  C)   SpO2: 96%       Physical Exam   Constitutional: No distress.   HENT:   Head: Normocephalic and atraumatic.   Nose: Nose normal.   Mouth/Throat: No oropharyngeal exudate.   Eyes: Conjunctivae are normal. Right eye exhibits no discharge. Left eye exhibits no discharge.   Neck: Neck supple. No thyromegaly present.   Cardiovascular: Normal rate and regular rhythm.    Pulmonary/Chest:   Coarse lung sounds throughout.  There is expiratory rhonchi and wheezes and some crackles at the bases bilateral.   Abdominal: Soft. Bowel sounds are normal. He exhibits no distension. There is no tenderness.   Musculoskeletal: He exhibits no edema.   Neurological: He is alert. No cranial nerve deficit. He exhibits normal muscle tone.   Skin: Skin is warm and dry. He is not diaphoretic.   Psychiatric: He has a normal mood and affect. His behavior is normal.       Medication List:  Current Outpatient Prescriptions   Medication Sig     acetaminophen (TYLENOL) 500 MG tablet Take 1,000 mg by mouth 3 (three) times a day as needed for pain or fever.      albuterol (PROAIR HFA;PROVENTIL HFA;VENTOLIN HFA) 90 mcg/actuation inhaler Inhale 2 puffs every 6 (six) hours as needed for wheezing. (Patient taking differently: Inhale 2 puffs every 6 (six) hours as needed for wheezing. )     aspirin 81 MG EC tablet Take 81 mg by mouth daily.      bisacodyl (DULCOLAX) 10 mg suppository Insert 1 suppository (10 mg total) into the rectum daily as needed (constipation).     carvedilol (COREG) 6.25 MG tablet Take 1 tablet (6.25 mg total) by mouth 2 (two) times a day with meals. (Patient taking differently: Take 6.25 mg by mouth 2 (two) times a day with meals. )     clonazePAM (KLONOPIN) 0.5 MG tablet Take 1 tablet (0.5  mg total) by mouth at bedtime.     clonazePAM (KLONOPIN) 0.5 MG tablet Take 1 tablet (0.5 mg total) by mouth daily as needed for anxiety.     cyanocobalamin 1,000 mcg/mL injection Inject 1,000 mcg into the shoulder, thigh, or buttocks every 30 (thirty) days.      fluticasone-vilanterol (BREO ELLIPTA) 200-25 mcg/dose DsDv inhaler Inhale 1 puff daily.      folic acid (FOLVITE) 1 MG tablet Take 1 tablet (1 mg total) by mouth daily. (Patient taking differently: Take 1 mg by mouth daily. )     furosemide (LASIX) 40 MG tablet Take 1 tablet (40 mg total) by mouth daily.     ipratropium-albuterol (DUO-NEB) 0.5-2.5 mg/3 mL nebulizer Take 3 mL by nebulization 4 (four) times a day.     magnesium oxide (MAG-OX) 400 mg tablet Take 400 mg by mouth at bedtime.      mirtazapine (REMERON) 7.5 MG tablet Take 1 tablet (7.5 mg total) by mouth at bedtime. (Patient taking differently: Take 7.5 mg by mouth at bedtime. )     morphine (MS CONTIN) 15 MG 12 hr tablet Take 2 tablets (30 mg total) by mouth 2 (two) times a day.     omeprazole (PRILOSEC) 20 MG capsule Take 20 mg by mouth daily before breakfast.      oxyCODONE (ROXICODONE) 5 MG immediate release tablet Take 3 tablets (15 mg total) by mouth every 6 (six) hours as needed.     PARoxetine (PAXIL) 40 MG tablet Take 1 tablet (40 mg total) by mouth every morning. (Patient taking differently: Take 40 mg by mouth every morning. )     polyethylene glycol (MIRALAX) 17 gram packet Take 1 packet (17 g total) by mouth daily as needed.     potassium chloride (KLOR-CON) 10 MEQ CR tablet Take 2 tablets (20 mEq total) by mouth 2 (two) times a day. With food.  OK to crush and mix with food (Patient taking differently: Take 20 mEq by mouth 2 (two) times a day. With food.  OK to crush and mix with food)     predniSONE (DELTASONE) 20 MG tablet Take 2 tablets (40 mg total) by mouth daily with breakfast. (Patient taking differently: Take 40 mg by mouth daily with breakfast. )     [START ON 9/5/2018]  predniSONE (DELTASONE) 20 MG tablet Take 1.5 tablets (30 mg total) by mouth daily. (Patient taking differently: Take 30 mg by mouth daily. )     senna-docusate (PERICOLACE) 8.6-50 mg tablet Take 2 tablets by mouth daily. (Patient taking differently: Take 2 tablets by mouth daily. )     sulfamethoxazole-trimethoprim (SEPTRA) 400-80 mg per tablet Take 1 tablet by mouth daily.       Labs: As per laboratory values were as follows white count was elevated on admission to the hospital 16.8 but then went down to 10.1.  Hemoglobin was 6.5 but then after transfusion was 8.3 and recheck is scheduled for today platelets 149,000 and Hemoccults were positive.  Sodium was 140, potassium was 4.4, CO2 is 26, calcium 7.6, BUN was 21, creatinine 0.71, GFR was greater than 60.      Assessment:    ICD-10-CM    1. Cough R05    2. Shortness of breath R06.02    3. Pain management R52    4. Lung cancer metastatic to bone (H) C34.90     C79.51    5. Diastolic congestive heart failure (H) I50.30    6. Bruising T14.8XXA    7. GI bleed K92.2    8. Anemia D64.9        Plan: Plan at this time will get hemoglobin stat today and will get a CBC stat today.  Will check his blood sugars every 8 hours and likely go up on his metoprolol if there is still elevated.  They may be elevated secondary to pain however his pain is been in pretty good control with 2 chest x-ray PA and lateral today secondary to his cough and congestion.  As far as bruising is concerned I did examine his flank and the bruising and ecchymosis is decreasing.  He did see his oncologist and is going to continue with chemotherapy likely in a couple of weeks.  We will continue to monitor above medical problems no other changes to care plan at this time.        Electronically signed by: Edgar Mckeon DO

## 2021-06-19 NOTE — PROGRESS NOTES
Pt arrived ambulatory to clinic for SQ B-12 Injection, Zometa, and Cycle # 2 Day # 1 of his chemotherapy regimen.  IV was started using aseptic technique on 2nd attempt with excellent blood return.  Administered SQ injection into Left UA, IV Zometa, and Keytruda per MD order.  Pt tolerated infusion well, no s/s of infusion reaction.  IV was flushed with NS then D/C'd using 2x2 and Coban.  Pt verbalized understanding of plan of care and return to clinic.

## 2021-06-19 NOTE — PROGRESS NOTES
Catracho is here today for ongoing management and tx of met lung cancer. Labs drawn and OV with Marcus Borrero planned, tx pending. Maddie De Jesus

## 2021-06-19 NOTE — PROGRESS NOTES
[facility] Hematology and Oncology Progress Note    Patient: Christian Rees  MRN: [id number]  Date of Service:         Reason for Visit    Chief Complaint   Patient presents with     HE Cancer       Assessment and Plan  Malignant neoplasm metastatic to lung, unspecified laterality (H)    Staging form: Lung, AJCC 7th Edition    - Clinical: Stage IV (T1b, N3, M1b) - Signed by Erica Borrero CNP on 3/14/2016    1. Lung cancer, stage IV: pt with recent progression. Was on observation. He is overexpressed on PDL1 so we have started single agent Keytruda. He has received one cycle.  He has been in the hospital 3 times in the last 6 weeks.  I told patient that this is a very concerning thing.  He would like to try more treatment because he says he did really not have much for side effect the last time.  So we will go ahead and give a second dose of Keytruda but I think if he ends up in the hospital again we may have to have a conversation about not doing further treatment because of his performance status.  He will return in 3 weeks for cycle 3 of the Keytruda and then we will do reimaging after that.    2. Cancer related pain, pt with bone mets: At this time patient will continue his MS Contin twice a day as well as the oxycodone for breakthrough pain.  I am a little concerned that the majority of his pain that he is currently experiencing is more arthritic and from being deconditioned.  I am going to try Celebrex 200 mg daily for that and will see how that goes.  I am going to check an ESR today as well.  If there is significant amount of inflammation we could potentially try a small dose of steroid.    3. Anemia: likely from chronic disease and cancer.  This is a little worse from when he left the hospital last week.  I am to go ahead and give him his vitamin B12 injection today which he has missed the last 2 months because that he was in the hospital.  We will have him come back next week for lab check  with possible blood transfusion if he is less than 8.    4. Elevated alk phos: likely from bone mets. Overall stable. Will monitor and adjust chemo if necessary. Other LFT's are normal.     ECOG Performance   ECOG Performance Status: 1     Distress Assessment  Distress Assessment Score: 4    Pain  Currently in Pain: Yes  Pain Score (Initial OR Reassessment): 6  Location: lower back: see above.         Problem List    1. Lung cancer metastatic to bone (H)  CC OFFICE VISIT LONG   2. Low magnesium levels  CC OFFICE VISIT LONG   3. Malignant neoplasm of hilus of left lung (H)  CC OFFICE VISIT LONG   4. Stage 4 malignant neoplasm of lung, unspecified laterality (H)  CC OFFICE VISIT LONG    Infusion Appointment    DISCONTINUED: sodium chloride 0.9% 250 mL infusion    DISCONTINUED: pembrolizumab 200 mg in sodium chloride 0.9% 100 mL chemo (KEYTRUDA)    DISCONTINUED: sodium chloride flush 20 mL (NS)    DISCONTINUED: heparin 100 unit/mL lockflush (PF) porcine 300-600 Units    DISCONTINUED: diphenhydrAMINE injection 50 mg (BENADRYL)    DISCONTINUED: famotidine 20 mg/2 mL injection 20 mg (PEPCID)    DISCONTINUED: hydrocortisone sod succ (PF) 100 mg/2 mL injection 100 mg    DISCONTINUED: acetaminophen tablet 1,000 mg (TYLENOL)   5. Hypomagnesemia  CC OFFICE VISIT LONG    Infusion Appointment    DISCONTINUED: sodium chloride 0.9% 250 mL infusion    DISCONTINUED: pembrolizumab 200 mg in sodium chloride 0.9% 100 mL chemo (KEYTRUDA)    DISCONTINUED: sodium chloride flush 20 mL (NS)    DISCONTINUED: heparin 100 unit/mL lockflush (PF) porcine 300-600 Units    DISCONTINUED: diphenhydrAMINE injection 50 mg (BENADRYL)    DISCONTINUED: famotidine 20 mg/2 mL injection 20 mg (PEPCID)    DISCONTINUED: hydrocortisone sod succ (PF) 100 mg/2 mL injection 100 mg    DISCONTINUED: acetaminophen tablet 1,000 mg (TYLENOL)   6. Malignant neoplasm metastatic to lung, unspecified laterality (H)  CC OFFICE VISIT LONG    Infusion Appointment     DISCONTINUED: sodium chloride 0.9% 250 mL infusion    DISCONTINUED: pembrolizumab 200 mg in sodium chloride 0.9% 100 mL chemo (KEYTRUDA)    DISCONTINUED: sodium chloride flush 20 mL (NS)    DISCONTINUED: heparin 100 unit/mL lockflush (PF) porcine 300-600 Units    DISCONTINUED: diphenhydrAMINE injection 50 mg (BENADRYL)    DISCONTINUED: famotidine 20 mg/2 mL injection 20 mg (PEPCID)    DISCONTINUED: hydrocortisone sod succ (PF) 100 mg/2 mL injection 100 mg    DISCONTINUED: acetaminophen tablet 1,000 mg (TYLENOL)   7. Generalized pain     8. Anemia, B12 deficiency        ______________________________________________________________________________    History of Present Illness    Measurable Disease: Ct scan, PET scan    Treatment: started Keytruda. Today is cycle 2.  Is delayed by 1 week to being in the hospital again  Zometa    Past Treatment:   Alimta maintenance, 8 cycles completed in December 2016  Patient has completed 5 cycles of carboplatin and Alimta with dose reduction for recurrent and metastatic lung cancer;  last cycle in June 2016  Left lower lobectomy in April 2013. He then received 4 cycles of chemotherapy as part of the ECOG 1505 clinical trial. He also received maintenance Avastin through December 2013 on the same clinical trial, this was stopped because of hip surgery     Interim History:  Pt is here today to continue on keytruda.  He states that he was in the hospital last week.  He feels that he is strong enough to get more treatment.  He does have some questions about his prognosis with his cancer.  He was a little confused about if it is the same cancer a different type of cancer.  He continues to have a lot of generalized pain.  He does not think the MS Contin or the oxycodone seem to help significantly.  But what he describes is that he feels that if he sitting or laying when he gets up he is extremely stiff and really painful in his joints and legs until he starts moving and that he does get  a little bit better on the pain.  He does have a lot of stamina issues and likes to go for a walk with his wife but really can only go short distances and then he needs to rest.  Refused to go to a TCU at discharge from his last hospitalization.  He does feel tired and weak most of the time.  Is trying to get some exercise and be out of bed more and more.      Pain Status  Currently in Pain: Yes    Review of Systems    Constitutional  Constitutional (WDL): Exceptions to WDL  Fatigue: Fatigue not relieved by rest, limiting self care ADL  Neurosensory  Neurosensory (WDL): Exceptions to WDL  Ataxia: Moderate symptoms, limiting instrumental ADL  Eye   Eye Disorder (WDL): All eye disorder elements are within defined limits  Ear  Ear Disorder (WDL): All ear disorder elements are within defined limits  Cardiovascular  Cardiovascular (WDL): All cardiovascular elements are within defined limits  Pulmonary  Respiratory (WDL): Exceptions to WDL  Cough: Mild symptoms, nonprescription intervention indicated  Dyspnea: Shortness of breath with minimal exertion, limiting instrumental ADL  Gastrointestinal  Gastrointestinal (WDL): Exceptions to WDL  Anorexia: Loss of appetite without alteration in eating habits  Genitourinary  Genitourinary (WDL): All genitourinary elements are within defined limits  Lymphatic  Lymph (WDL): All lymph disorder elements are within defined limits  Musculoskeletal and Connective Tissue  Musculoskeletal and Connetive Tissue Disorders (WDL): Exceptions to WDL  Muscle Weakness : Symptomatic, evident on physical exam, limiting instrumental ADL  Integumentary  Integumentary (WDL): All integumentary elements are within defined limits  Patient Coping  Patient Coping: Accepting  Distress Assessment  Distress Assessment Score: 4  Accompanied by  Accompanied by: Family Member  Oral Chemo Adherence       Past History  Past Medical History:   Diagnosis Date     Anxiety      Chronic back pain      Closed Posterior  Dislocation Of The Hip     Created by Conversion      COPD (chronic obstructive pulmonary disease) (H) 03/20/2018     Depression      DVT (deep venous thrombosis) (H)      Gout      Heart failure (H)      Hypertension      Lung cancer (H)     Left lung     Persistent Atrial Fibrillation     Created by Conversion [facility] Annotation: Apr 17 2014 11:48AM - Ismael Holbrooket: 3/14 new afib  with contr VR with hip replacement [id number] score of  3 with 1  point each for age 65 and older, HTN and CHF hx---new warfarin start        PHYSICAL EXAM:  /64  Pulse 74  Temp 98  F (36.7  C) (Oral)   Wt 180 lb 6 oz (81.8 kg)  SpO2 95%  BMI 28.68 kg/m2  GENERAL: no acute distress. Cooperative in conversation. Here with wife. In wheelchair  HEENT: pupils are equal, round and reactive. Oromucosa is clean and intact. No ulcerations or mucositis noted. No bleeding noted.  RESP: lungs are clear bilaterally per auscultation. Regular respiratory rate. No wheezes or rhonchi.  CV: Regular, rate and rhythm. No murmurs.  ABD: soft, nontender. Positive bowel sounds. No organomegaly.   MUSCULOSKELETAL: bilateral ankle swelling. Left slightly greater than right, which is typical for him  NEURO: non focal. Alert and oriented x3.   PSYCH: within normal limits. No depression or anxiety.  SKIN: warm dry intact   LYMPH: no cervical, supraclavicular lymphadenopathy      Lab Results  Recent Results (from the past 24 hour(s))   Comprehensive Metabolic Panel   Result Value Ref Range    Sodium 137 136 - 145 mmol/L    Potassium 4.6 3.5 - 5.0 mmol/L    Chloride 101 98 - 107 mmol/L    CO2 25 22 - 31 mmol/L    Anion Gap, Calculation 11 5 - 18 mmol/L    Glucose 113 70 - 125 mg/dL    BUN 14 8 - 28 mg/dL    Creatinine 0.84 0.70 - 1.30 mg/dL    GFR MDRD Af Amer >60 >60 mL/min/1.73m2    GFR MDRD Non Af Amer >60 >60 mL/min/1.73m2    Bilirubin, Total 0.7 0.0 - 1.0 mg/dL    Calcium 8.6 8.5 - 10.5 mg/dL    Protein, Total 5.9 (L) 6.0 - 8.0 g/dL     Albumin 2.6 (L) 3.5 - 5.0 g/dL    Alkaline Phosphatase 265 (H) 45 - 120 U/L    AST 24 0 - 40 U/L    ALT 11 0 - 45 U/L   HM1 (CBC with Diff)   Result Value Ref Range    WBC 12.8 (H) 4.0 - 11.0 thou/uL    RBC 3.03 (L) 4.40 - 6.20 mill/uL    Hemoglobin 9.1 (L) 14.0 - 18.0 g/dL    Hematocrit 29.0 (L) 40.0 - 54.0 %    MCV 96 80 - 100 fL    MCH 30.0 27.0 - 34.0 pg    MCHC 31.4 (L) 32.0 - 36.0 g/dL    RDW 15.2 (H) 11.0 - 14.5 %    Platelets 247 140 - 440 thou/uL    MPV 10.6 8.5 - 12.5 fL    Neutrophils % 72 (H) 50 - 70 %    Lymphocytes % 14 (L) 20 - 40 %    Monocytes % 9 2 - 10 %    Eosinophils % 5 0 - 6 %    Basophils % 1 0 - 2 %    Neutrophils Absolute 9.1 (H) 2.0 - 7.7 thou/uL    Lymphocytes Absolute 1.8 0.8 - 4.4 thou/uL    Monocytes Absolute 1.1 (H) 0.0 - 0.9 thou/uL    Eosinophils Absolute 0.6 (H) 0.0 - 0.4 thou/uL    Basophils Absolute 0.1 0.0 - 0.2 thou/uL   Sedimentation Rate   Result Value Ref Range    Sed Rate 80 (H) 0 - 15 mm/hr         Imaging    Xr Hip Left 2 Or More Vws    Result Date: 7/4/2018  XR HIP LEFT 2 OR MORE VWS 7/4/2018 4:24 PM INDICATION: Left hip pain COMPARISON: None. FINDINGS: Prior left total hip arthroplasty with cerclage wires about the femoral stem. Alignment normal, no fractures evident.     Ct Head Without Contrast    Result Date: 6/30/2018  [facility] CT HEAD WO CONTRAST 6/30/2018 11:12 PM INDICATION: Encephalopathy, altered mental status. Anisocoria, AMS. TECHNIQUE: Without IV contrast. Dose reduction techniques were used. CONTRAST: None. COMPARISON:  CT head 03/03/2016. FINDINGS: No intracranial hemorrhage, extraaxial collection, mass effect or CT evidence of acute infarct. Stable mild cerebral volume loss. Osseous structures are intact. Trace mucosal thickening ethmoid air cells. Visualized paranasal sinuses and mastoid air cells are clear. Orbits are grossly normal.     CONCLUSION: 1.  No change. No acute hemorrhage, midline shift, or mass effect. 2.  Mild volume loss.      Ct Chest Without Contrast    Result Date: 6/30/2018  CT CHEST WO CONTRAST 6/30/2018 2:23 PM INDICATION: Hypoxia TECHNIQUE: Routine chest. Dose reduction techniques were used. IV CONTRAST: None COMPARISON: CTA chest 06/25/2018 FINDINGS: LUNGS AND PLEURA: Small right pleural effusion has slightly decreased in size. Groundglass opacity however within the medial aspect of the right lower lobe has slightly progressed. Small left pleural effusion is unchanged. Numerous bilateral pulmonary nodules are unchanged. MEDIASTINUM: No enlarged mediastinal or hilar lymph nodes. Atherosclerotic disease thoracic aorta. Coronary artery calcifications. LIMITED UPPER ABDOMEN: Negative. MUSCULOSKELETAL: Multiple skeletal metastasis and multiple rib fractures most of which appear old. Fracture involving the right ninth rib may be acute.     CONCLUSION: 1.  Groundglass opacities right lower lobe have slightly progressed suggestive of pneumonia. 2.  Right pleural effusion is slightly smaller and small left pleural effusion is unchanged. 3.  Stable bilateral pulmonary nodules and skeletal metastasis. 4.  Numerous old pathologic fractures bilateral ribs with possible acute right ninth rib fracture.    Cta Chest [place]    Result Date: 6/25/2018  CTA CHEST PE RUN 6/25/2018 1:22 PM INDICATION: SOB. Lung cancer. TECHNIQUE: Helical acquisition through the chest was performed during the arterial phase of contrast enhancement using IV contrast. 2D and 3D reconstructions were performed by the CT technologist. Dose reduction techniques were used. IV CONTRAST: Iohexol (Omni) 75mL COMPARISON: 6/4/18 FINDINGS: ANGIOGRAM CHEST: Pulmonary arteries are normal caliber and negative for pulmonary emboli. The proximal descending thoracic aorta is mildly enlarged at 33 mm in diameter. There is atherosclerotic disease. No aortic dissection. RV/LV RATIO: N/A LUNGS AND PLEURA: No bronchial wall thickening or bronchiectasis. Small bilateral pleural effusions  are again seen. Numerous pulmonary nodules are again seen throughout the lungs without significant change from the comparison study. There are slightly increased groundglass opacities in the right lower lobe. There is slightly increased opacity in the lateral right middle lobe. MEDIASTINUM: The heart is normal in size. Normal esophagus. No thoracic lymphadenopathy by size criteria. There is advanced coronary artery calcification. LIMITED UPPER ABDOMEN: Cholelithiasis. Colonic diverticulosis. MUSCULOSKELETAL: Osteopenia is noted. Bony destruction of the right sixth rib is again seen. Multiple subacute pathologic fractures are seen on the right. Multiple healing or healed fractures are seen on the left.     CONCLUSION: 1.  No PE. 2.  Pulmonary nodules are again seen throughout the lungs. There is only minimal increase in groundglass opacity in the right lower lobe, which may be an inflammatory or infectious process. 3.  Multiple bone metastases with pathologic fractures are again seen. 4.  Other findings are detailed above.     Ct Abdomen Pelvis With Oral With Iv Contrast    Result Date: 7/6/2018  CT ABDOMEN PELVIS W ORAL W IV CONTRAST 7/6/2018 12:51 PM     INDICATION: Neoplasm: chest, lung, rx monitor or follow-up left sided abdominal pain and leukocytosis. TECHNIQUE: CT abdomen and pelvis. Multiplanar reformation images (MPR). Dose reduction techniques were used. IV CONTRAST: Iohexol (Omni) 100 mL. COMPARISON: CT chest abdomen and pelvis 06/04/2018 and chest CT 06/30/2018. FINDINGS: LUNG BASES: There are small-to-moderate bilateral pleural effusions  increased in size compared to study of 06/04/2018. There are small pulmonary nodules visible at the bases and seen on the prior studies. ABDOMEN: There are multiple gallstones. A liver lesion is not seen. Enhancing areas seen in the spleen on the prior study are not identified and may have been due to the timing of bolus. There is a small cystic area in the inferior  aspect of the spleen measuring 9 mm image 23 that was present on the prior study. The adrenals and pancreas are unremarkable. No renal or ureteral calcifications are seen. No hydronephrosis is seen. There are atherosclerotic changes in the aorta. The colon contains a moderate amount of fluid. PELVIS: There is sigmoid diverticulosis without evidence for diverticulitis. Artifact from hip prostheses obscures a great deal of detail. There is a normal-appearing appendix. MUSCULOSKELETAL: There are degenerative changes in the spine with scoliosis, there are bilateral hip arthroplasties. There is a some protrusio acetabuli on the left.     CONCLUSION: 1.  There is sigmoid diverticulosis without evidence for diverticulitis. No evidence of appendicitis is seen. No renal or ureteral calcifications are seen. 2.  There are bilateral total hip arthroplasties.        Signed by: Erica Borrero, THU

## 2021-06-19 NOTE — PROGRESS NOTES
Code Status:  Full Code  Visit Type: Problem Visit     Facility:  [facility] [[id number]]         Facility Type: SNF (Skilled Nursing Facility, TCU)    History of Present Illness: Christian Rees is a 68 y.o. male who I am seeing today for follow-up on the TCU.  Patient with a history of stage IV adenocarcinoma, COPD, atrial fib recently hospitalized with increasing shortness of breath, cough or leg swelling.  Patient admitted with acute hypoxic respiratory failure.  It was felt that he had a COPD exacerbation of systolic heart failure in the setting of toxic lung cancer as well as pneumonia. Chest x-ray on admission showed a persistent patchy infiltrate of the mid to lower right lung with small left pleural effusion, multiple bilateral metastatic pulmonary nodules and right-sided pathologic rib fractures.  Of concern was repeat CT Chest 7/18 as outlined below and concern for cryptogenic organizing pneumonia for which pulmonology was consulted.  Pro-calcitonin elevated to 1.4 which was previously normal.  Sputum culture showed usual tanisha.  However pulmonary recommended to treat with prednisone 40 mg daily ×6 weeks then 30 mg daily ×8 weeks.  He was also treated with Bactrim as well as Augmentin.  Wn off oxygen as able to goal SPO2 88%. He continues on nebulizer. Acute/diastolic heart failure: improved.  on admission compared to 497 on 6/30/18.  Symptoms of orthopnea, dyspnea and leg swelling consistent with mild exacerbation.  Improved with IV Lasix. Discharged on oral Lasix 40mg. Paroxysmal atrial fibrillation, frequent atrial ectopy: On aspirin. Unfamiliar with the details regarding any discussion of stronger anticoagulation such as warfarin or equivalent long-term as reduction in the risk of thromboembolic event which in this patient is elevated. Pt continues carvedilol and aspirin. Anemia. He did undergo transfusion 1U PRBC 7/18.  Stage IV metastatic lung cancer to lymph nodes and  bone: Status post second dose of Keytruda 1 week ago.  This is patient's third or fourth hospitalization in the last 6 weeks.  Palliative care was consulted and patient remains full code. Patient does wish to consider possible additional therapy with alternative agent if able. Malignant related bone pain: Thoracic spine, ribs.  Continue MS Contin 15 mg every 12, oxycodone 5 mg every 6 hours as needed for breakthrough pain.  Continue Tylenol and lidocaine patch. Bilateral lower extremity edema: Venous duplex ultrasound negative for DVT.  Improved with IV diuretics.     Anxiety disorder: Psychiatry consulted.  Paxil increased to 40 mg daily.  Mirtazapine 7.5 mg at bedtime initiated at bedtime. Scheduled clonazepam once a day continued per his chronic regimen. Patient to follow-up with oncology psychologist after discharge with [facility], Karen Dang. 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 No evidence for pathologic fracture in this region.  Venous duplex ultrasound on 7/16 for DVT.    Repeat x-ray right hip on 7/20/18 showed postop bilateral total hip arthroplasties with pelvis and right hip otherwise negative without fractures or dislocations. Oral candidiasis treated with nystatin swish and swallow.   Hypokalemia, hypomagnesemia, hypocalcemia: replaced.  Optimize nutritional status. On potassium supplementation at discharge as well as magnesium supplementation. Moderate protein calorie malnutrition: Continue with Magic cup twice daily with meals.  Encourage nutrition at TCU.  Recommend dietitian follow a TCU.    Today I visit with pt, wife and grand daughter. I am not sure the extent of pt knowledge with current prognosis. He was reviewed diagnosis from hospital. He has a follow up appt with palliative care tomorrow however he wishes to continue with  "treatment. He has not seen the oncologist yet. He has an upcoming visit. He tells me today he feels less shortness of breath. He does feel better using the oxygen. He wants to push himself to limits so he can get stronger and \"get out of here.\" He is asking when he can travel again. He continues with a great deal of pain in his back, bottom and right thigh. He was worked up extensively for this during hospitalization and it was thought to be malignant pain. He is moving better. He continues to cough up green phelgm. He continues on nebs.       Active Ambulatory Problems     Diagnosis Date Noted     Generalized Anxiety Disorder      Right Bundle Branch Block With Left Anterior Fascicular Block      SOB (shortness of breath)      Familial (Benign Essential) Tremor      Mixed hyperlipidemia      HTN (hypertension)      CHF (congestive heart failure) (H)      Leukocytosis, unspecified type      Dizziness      Hypokalemia      Pigmented Nevus      Joint Pain, Localized In The Shoulder      Normocytic anemia      Edema      Joint Pain, Localized In The Hip      Idiopathic chronic gout without tophus, unspecified site      Excessive Sweating      Lung cancer (H) 07/25/2014     B12 deficiency 10/13/2014     Malignant neoplasm metastatic to lung, unspecified laterality (H) 01/22/2015     Hypomagnesemia 06/27/2016     CAD (coronary artery disease) 02/10/2017     Venous hypertension of both lower extremities 02/15/2017     Postthrombotic syndrome 02/15/2017     Acquired lymphedema of leg 02/15/2017     Vitamin D deficiency 02/15/2017     Chronic diastolic heart failure (H) 02/23/2017     Functional diarrhea 10/02/2017     Controlled substance agreement signed 10/02/2017     Obstructive sleep apnea syndrome 03/14/2018     Chronic right-sided low back pain without sciatica 05/11/2018     Elevated liver enzymes 05/11/2018     Stage 4 lung cancer, unspecified laterality (H) 06/04/2018     Cancer associated pain      Generalized " weakness      Encounter for palliative care      Mood disorder due to medical condition      Depression with anxiety      Anxiety in acute stress reaction      Sleeping difficulty      Acute exacerbation of CHF (congestive heart failure) (H) 06/25/2018     Acute kidney injury (H)      Hyperkalemia 06/30/2018     Acute respiratory failure with hypoxia (H)      Acute encephalopathy      History of CHF (congestive heart failure)      Goals of care, counseling/discussion      Acute renal failure, unspecified acute renal failure type (H)      Pneumonia of right lower lobe due to infectious organism (H)      Respiratory distress 07/16/2018     Cryptogenic organizing pneumonia (H)      Adjustment disorder with anxious mood      Resolved Ambulatory Problems     Diagnosis Date Noted     Diarrhea      Closed Posterior Dislocation Of The Hip      Arthropathy Of The Ankle / Foot      Essential Hypertriglyceridemia      Abdominal Pain      Acute Gout      Chest Pain      Persistent Atrial Fibrillation      Headache 12/23/2014     Snoring 12/23/2014     Anxiety 04/11/2018     Dyspnea 04/11/2018     Healthcare-associated pneumonia      Past Medical History:   Diagnosis Date     Anxiety      Chronic back pain      Closed Posterior Dislocation Of The Hip      COPD (chronic obstructive pulmonary disease) (H) 03/20/2018     Depression      DVT (deep venous thrombosis) (H)      Gout      Heart failure (H)      Hypertension      Lung cancer (H)      Persistent Atrial Fibrillation        Current Outpatient Prescriptions   Medication Sig     acetaminophen (TYLENOL) 500 MG tablet Take 1,000 mg by mouth 3 (three) times a day as needed for pain.     albuterol (PROAIR HFA;PROVENTIL HFA;VENTOLIN HFA) 90 mcg/actuation inhaler Inhale 2 puffs every 6 (six) hours as needed for wheezing. (Patient taking differently: Inhale 2 puffs every 6 (six) hours as needed for wheezing. )     amoxicillin-clavulanate (AUGMENTIN) 875-125 mg per tablet Take 1  tablet by mouth 2 (two) times a day for 5 days.     aspirin 81 MG EC tablet Take 81 mg by mouth daily.      bisacodyl (DULCOLAX) 10 mg suppository Insert 1 suppository (10 mg total) into the rectum daily as needed (constipation).     carvedilol (COREG) 6.25 MG tablet Take 1 tablet (6.25 mg total) by mouth 2 (two) times a day with meals. (Patient taking differently: Take 6.25 mg by mouth 2 (two) times a day with meals. )     clonazePAM (KLONOPIN) 0.5 MG tablet Take 1 tablet (0.5 mg total) by mouth at bedtime.     cyanocobalamin 1,000 mcg/mL injection Inject 1,000 mcg into the shoulder, thigh, or buttocks every 30 (thirty) days.      fluticasone-vilanterol (BREO ELLIPTA) 200-25 mcg/dose DsDv inhaler Inhale 1 puff daily.      furosemide (LASIX) 40 MG tablet Take 1 tablet (40 mg total) by mouth 2 (two) times a day at 9am and 6pm. X 5 days then change to qam only     ipratropium-albuterol (DUO-NEB) 0.5-2.5 mg/3 mL nebulizer Take 3 mL by nebulization 4 (four) times a day.     Lactobacillus acidoph-L.bulgar (LACTINEX) 100 million cell packet Take 1 packet by mouth 3 (three) times a day with meals for 5 days.     lidocaine 4 % patch Place 1 patch on the skin daily as needed for pain. Remove and discard patch with 12 hours or as directed by MD.     mirtazapine (REMERON) 7.5 MG tablet Take 1 tablet (7.5 mg total) by mouth at bedtime.     morphine (MS CONTIN) 15 MG 12 hr tablet Take 1 tablet (15 mg total) by mouth 2 (two) times a day.     nystatin (MYCOSTATIN) 100,000 unit/mL suspension Take 5 mL (500,000 Units total) by mouth 4 (four) times a day for 7 days.     omeprazole (PRILOSEC) 20 MG capsule Take 20 mg by mouth daily before breakfast.      oxyCODONE (ROXICODONE) 5 MG immediate release tablet Take 1 tablet (5 mg total) by mouth every 6 (six) hours as needed.     PARoxetine (PAXIL) 40 MG tablet Take 1 tablet (40 mg total) by mouth every morning.     polyethylene glycol (MIRALAX) 17 gram packet Take 1 packet (17 g total)  by mouth daily as needed.     potassium chloride (KLOR-CON) 10 MEQ CR tablet Take 2 tablets (20 mEq total) by mouth 2 (two) times a day. With food.  OK to crush and mix with food     predniSONE (DELTASONE) 20 MG tablet Take 2 tablets (40 mg total) by mouth daily with breakfast.     [START ON 9/5/2018] predniSONE (DELTASONE) 20 MG tablet Take 1.5 tablets (30 mg total) by mouth daily.     senna-docusate (PERICOLACE) 8.6-50 mg tablet Take 2 tablets by mouth daily.     sulfamethoxazole-trimethoprim (SEPTRA) 400-80 mg per tablet Take 1 tablet by mouth daily.       No Known Allergies      Review of Systems   No fevers or chills. No headache, lightheadedness or dizziness. Chronic shortness of breath however he feels it has improved with the use of oxygen, no chest pains or palpitations. He is coughing up green phelgm. Appetite is poor. No nausea, vomiting, constipation or diarrhea. No dysuria, frequency, burning or pain with urination. Chronic pain with complaints of back pain, bottom pain and right thigh/hip pain. He is requesting an increase in his pain meds. He also reports anxiety and claustrophobia.        Physical Exam   PHYSICAL EXAMINATION:  Vital signs: /73, heart rate 77, respirations 16, temperature 98.3, O2 sat 99% on 2 L, current weight 176 pounds.  General: Awake, Alert, oriented x3, appropriately, follows simple commands, conversant  HEENT:PERRLA, Pink conjunctiva, anicteric sclerae, moist oral mucosa  NECK: Supple, without any lymphadenopathy, or masses  CVS:  S1  S2, without murmur or gallop.   LUNG: Clear to auscultation, No wheezes, rales or rhonci.  Continues on O2 at 2 L nasal cannula.  No dyspnea at rest.  He is coughing up thick green.  BACK: No kyphosis of the thoracic spine  ABDOMEN: Soft, nontender to palpation, with positive bowel sounds  EXTREMITIES: Good range of motion on both upper and lower extremities with generalized weakness, 1+ pedal edema, no calf tenderness  SKIN: Warm and dry,  no rashes or erythema noted  NEUROLOGIC: Intact, pulses palpable  PSYCHIATRIC: Cognition intact.  Anxious.            Labs:    Recent Results (from the past 240 hour(s))   ECG 12 lead nursing unit performed   Result Value Ref Range    SYSTOLIC BLOOD PRESSURE 161 mmHg    DIASTOLIC BLOOD PRESSURE 74 mmHg    VENTRICULAR RATE 123 BPM    ATRIAL RATE 111 BPM    P-R INTERVAL  ms    QRS DURATION 116 ms    Q-T INTERVAL 368 ms    QTC CALCULATION (BEZET) 526 ms    P Axis  degrees    R AXIS -73 degrees    T AXIS 54 degrees    MUSE DIAGNOSIS       Atrial fibrillation with rapid ventricular response  Left axis deviation  Incomplete right bundle branch block  Inferior infarct , age undetermined  ST & T wave abnormality, consider anterior ischemia  Abnormal ECG  When compared with ECG of 30-JUN-2018 13:53,  Atrial fibrillation has replaced Sinus rhythm  Vent. rate has increased BY  45 BPM  Incomplete right bundle branch block has replaced Right bundle branch block  Inferior infarct is now Present  Confirmed by VASILE GUZMAN MD LOC: ([id number]) on 7/16/2018 4:57:17 PM     Basic Metabolic Panel   Result Value Ref Range    Sodium 137 136 - 145 mmol/L    Potassium 4.8 3.5 - 5.0 mmol/L    Chloride 99 98 - 107 mmol/L    CO2 22 22 - 31 mmol/L    Anion Gap, Calculation 16 5 - 18 mmol/L    Glucose 131 (H) 70 - 125 mg/dL    Calcium 8.5 8.5 - 10.5 mg/dL    BUN 26 8 - 28 mg/dL    Creatinine 1.25 0.70 - 1.30 mg/dL    GFR MDRD Af Amer >60 >60 mL/min/1.73m2    GFR MDRD Non Af Amer 57 (L) >60 mL/min/1.73m2   HM2(CBC w/o Differential)   Result Value Ref Range    WBC 15.0 (H) 4.0 - 11.0 thou/uL    RBC 3.36 (L) 4.40 - 6.20 mill/uL    Hemoglobin 10.5 (L) 14.0 - 18.0 g/dL    Hematocrit 33.2 (L) 40.0 - 54.0 %    MCV 99 80 - 100 fL    MCH 31.3 27.0 - 34.0 pg    MCHC 31.6 (L) 32.0 - 36.0 g/dL    RDW 16.4 (H) 11.0 - 14.5 %    Platelets 226 140 - 440 thou/uL    MPV 10.2 8.5 - 12.5 fL   Troponin I   Result Value Ref Range    Troponin I 0.16 0.00 - 0.29 ng/mL    Lactic Acid   Result Value Ref Range    Lactic Acid 1.6 0.5 - 2.2 mmol/L   INR   Result Value Ref Range    INR 1.14 (H) 0.90 - 1.10   BNP(B-type Natriuretic Peptide)   Result Value Ref Range     (H) 0 - 67 pg/mL   ECG 12 lead with MUSE   Result Value Ref Range    SYSTOLIC BLOOD PRESSURE  mmHg    DIASTOLIC BLOOD PRESSURE  mmHg    VENTRICULAR RATE 97 BPM    ATRIAL RATE 97 BPM    P-R INTERVAL 168 ms    QRS DURATION 126 ms    Q-T INTERVAL 428 ms    QTC CALCULATION (BEZET) 543 ms    P Axis 49 degrees    R AXIS -69 degrees    T AXIS 66 degrees    MUSE DIAGNOSIS       Sinus rhythm with Premature atrial complexes  Right bundle branch block  Left anterior fascicular block  ** Bifascicular block **  Abnormal ECG  When compared with ECG of 16-JUL-2018 09:55,  Sinus rhythm has replaced Atrial fibrillation  Left anterior fascicular block is now Present  Criteria for Inferior infarct are no longer Present  Confirmed by VASILE GUZMAN MD LOC:[facility] ([id number]) on 7/16/2018 5:11:07 PM     Blood culture #1   Result Value Ref Range    Anaerobic Blood Culture Bottle Specimen not received No Growth, No organisms seen, bottle returned to instrument, Specimen not received    Aerobic Blood Culture Bottle No Growth No Growth, No organisms seen, bottle returned to instrument, Specimen not received   Blood Gases, Arterial   Result Value Ref Range    pH, Arterial 7.29 (L) 7.37 - 7.44    pCO2, Arterial 52 (H) 35 - 45 mm Hg    pO2, Arterial 75 75 - 85 mm Hg    Bicarbonate, Arterial Calc 23.5 23.0 - 29.0 mmol/L    O2 Sat, Arterial 97.2 (H) 95.0 - 96.0 %    Oxyhemoglobin 93.6 (L) 95.0 - 96.0 %    Base Excess, Arterial Calc -1.8 mmol/L    Ventilation Mode Nasal cannula     Flow 2.0 LPM    Sample Stabilized Temperature 37.0 degrees C   Procalcitonin   Result Value Ref Range    Procalcitonin 1.55 (H) 0.00 - 0.49 ng/mL   Sputum culture   Result Value Ref Range    Culture Usual Kristy     Gram Stain Result 4+ Polymorphonuclear leukocytes     Gram  Stain Result 3+ Gram positive cocci in pairs     Gram Stain Result 2+ Yeast     Gram Stain Result 2+ Gram positive bacilli, diphtheroid like    Troponin I   Result Value Ref Range    Troponin I 0.21 0.00 - 0.29 ng/mL   Blood Gases, Arterial   Result Value Ref Range    pH, Arterial 7.34 (L) 7.37 - 7.44    pCO2, Arterial 47 (H) 35 - 45 mm Hg    pO2, Arterial 72 (L) 75 - 85 mm Hg    Bicarbonate, Arterial Calc 24.5 23.0 - 29.0 mmol/L    O2 Sat, Arterial 97.2 (H) 95.0 - 96.0 %    Oxyhemoglobin 93.6 (L) 95.0 - 96.0 %    Base Excess, Arterial Calc -0.5 mmol/L    Ventilation Mode Nasal cannula     Flow 2.0 LPM    Sample Stabilized Temperature 37.0 degrees C   Troponin I   Result Value Ref Range    Troponin I 0.22 0.00 - 0.29 ng/mL   Magnesium   Result Value Ref Range    Magnesium 1.1 (L) 1.8 - 2.6 mg/dL   Renal Function Profile   Result Value Ref Range    Albumin 2.3 (L) 3.5 - 5.0 g/dL    Calcium 7.3 (L) 8.5 - 10.5 mg/dL    Phosphorus 3.1 2.5 - 4.5 mg/dL    Glucose 184 (H) 70 - 125 mg/dL    BUN 30 (H) 8 - 28 mg/dL    Creatinine 1.18 0.70 - 1.30 mg/dL    Sodium 138 136 - 145 mmol/L    Potassium 3.4 (L) 3.5 - 5.0 mmol/L    Chloride 100 98 - 107 mmol/L    CO2 27 22 - 31 mmol/L    Anion Gap, Calculation 11 5 - 18 mmol/L    GFR MDRD Af Amer >60 >60 mL/min/1.73m2    GFR MDRD Non Af Amer >60 >60 mL/min/1.73m2   Procalcitonin   Result Value Ref Range    Procalcitonin 1.54 (H) 0.00 - 0.49 ng/mL   HM1 (CBC with Diff)   Result Value Ref Range    WBC 8.3 4.0 - 11.0 thou/uL    RBC 2.49 (L) 4.40 - 6.20 mill/uL    Hemoglobin 7.7 (L) 14.0 - 18.0 g/dL    Hematocrit 24.4 (L) 40.0 - 54.0 %    MCV 98 80 - 100 fL    MCH 30.9 27.0 - 34.0 pg    MCHC 31.6 (L) 32.0 - 36.0 g/dL    RDW 16.4 (H) 11.0 - 14.5 %    Platelets 148 140 - 440 thou/uL    MPV 10.5 8.5 - 12.5 fL    Neutrophils % 92 (H) 50 - 70 %    Lymphocytes % 5 (L) 20 - 40 %    Monocytes % 3 2 - 10 %    Eosinophils % 0 0 - 6 %    Basophils % 0 0 - 2 %    Neutrophils Absolute 7.6 2.0 - 7.7  thou/uL    Lymphocytes Absolute 0.4 (L) 0.8 - 4.4 thou/uL    Monocytes Absolute 0.2 0.0 - 0.9 thou/uL    Eosinophils Absolute 0.0 0.0 - 0.4 thou/uL    Basophils Absolute 0.0 0.0 - 0.2 thou/uL   HM1 (CBC with Diff)   Result Value Ref Range    WBC 10.2 4.0 - 11.0 thou/uL    RBC 2.70 (L) 4.40 - 6.20 mill/uL    Hemoglobin 8.3 (L) 14.0 - 18.0 g/dL    Hematocrit 26.5 (L) 40.0 - 54.0 %    MCV 98 80 - 100 fL    MCH 30.7 27.0 - 34.0 pg    MCHC 31.3 (L) 32.0 - 36.0 g/dL    RDW 16.7 (H) 11.0 - 14.5 %    Platelets 177 140 - 440 thou/uL    MPV 10.6 8.5 - 12.5 fL    Neutrophils % 93 (H) 50 - 70 %    Lymphocytes % 5 (L) 20 - 40 %    Monocytes % 2 2 - 10 %    Eosinophils % 0 0 - 6 %    Basophils % 0 0 - 2 %    Neutrophils Absolute 9.4 (H) 2.0 - 7.7 thou/uL    Lymphocytes Absolute 0.5 (L) 0.8 - 4.4 thou/uL    Monocytes Absolute 0.2 0.0 - 0.9 thou/uL    Eosinophils Absolute 0.0 0.0 - 0.4 thou/uL    Basophils Absolute 0.0 0.0 - 0.2 thou/uL   Potassium   Result Value Ref Range    Potassium 3.1 (L) 3.5 - 5.0 mmol/L   Magnesium   Result Value Ref Range    Magnesium 2.6 1.8 - 2.6 mg/dL   Renal Function Profile   Result Value Ref Range    Albumin 2.3 (L) 3.5 - 5.0 g/dL    Calcium 7.3 (L) 8.5 - 10.5 mg/dL    Phosphorus 2.0 (L) 2.5 - 4.5 mg/dL    Glucose 173 (H) 70 - 125 mg/dL    BUN 29 (H) 8 - 28 mg/dL    Creatinine 0.97 0.70 - 1.30 mg/dL    Sodium 137 136 - 145 mmol/L    Potassium 3.1 (L) 3.5 - 5.0 mmol/L    Chloride 96 (L) 98 - 107 mmol/L    CO2 30 22 - 31 mmol/L    Anion Gap, Calculation 11 5 - 18 mmol/L    GFR MDRD Af Amer >60 >60 mL/min/1.73m2    GFR MDRD Non Af Amer >60 >60 mL/min/1.73m2   HM1 (CBC with Diff)   Result Value Ref Range    WBC 9.1 4.0 - 11.0 thou/uL    RBC 2.44 (L) 4.40 - 6.20 mill/uL    Hemoglobin 7.5 (L) 14.0 - 18.0 g/dL    Hematocrit 23.4 (L) 40.0 - 54.0 %    MCV 96 80 - 100 fL    MCH 30.7 27.0 - 34.0 pg    MCHC 32.1 32.0 - 36.0 g/dL    RDW 16.5 (H) 11.0 - 14.5 %    Platelets 172 140 - 440 thou/uL    MPV 10.5 8.5 -  12.5 fL    Neutrophils % 90 (H) 50 - 70 %    Lymphocytes % 6 (L) 20 - 40 %    Monocytes % 4 2 - 10 %    Eosinophils % 0 0 - 6 %    Basophils % 0 0 - 2 %    Neutrophils Absolute 8.0 (H) 2.0 - 7.7 thou/uL    Lymphocytes Absolute 0.5 (L) 0.8 - 4.4 thou/uL    Monocytes Absolute 0.4 0.0 - 0.9 thou/uL    Eosinophils Absolute 0.0 0.0 - 0.4 thou/uL    Basophils Absolute 0.0 0.0 - 0.2 thou/uL   Type and Screen   Result Value Ref Range    ABORh A POS     Antibody Screen Negative Negative   Potassium   Result Value Ref Range    Potassium 3.9 3.5 - 5.0 mmol/L   Legionella Antigen, Urine   Result Value Ref Range    L pneumo Urine Agn Results Below    Sputum culture   Result Value Ref Range    Culture Usual Kristy     Gram Stain Result 2+ Polymorphonuclear leukocytes     Gram Stain Result 1+ Yeast     Gram Stain Result 1+ Gram positive cocci in pairs    Crossmatch   Result Value Ref Range    Crossmatch Compatible     Blood Expiration Date [id number]     Unit Type A Pos     Unit Number [id number]     Status Transfused     Component Red Blood Cells     PRODUCT CODE [id number]     Issue Date and Time [id number]     Blood Type 6200     CODING SYSTEM [id number]    Magnesium   Result Value Ref Range    Magnesium 1.7 (L) 1.8 - 2.6 mg/dL   Renal Function Profile   Result Value Ref Range    Albumin 2.4 (L) 3.5 - 5.0 g/dL    Calcium 6.6 (L) 8.5 - 10.5 mg/dL    Phosphorus 2.7 2.5 - 4.5 mg/dL    Glucose 143 (H) 70 - 125 mg/dL    BUN 31 (H) 8 - 28 mg/dL    Creatinine 0.85 0.70 - 1.30 mg/dL    Sodium 141 136 - 145 mmol/L    Potassium 3.7 3.5 - 5.0 mmol/L    Chloride 97 (L) 98 - 107 mmol/L    CO2 31 22 - 31 mmol/L    Anion Gap, Calculation 13 5 - 18 mmol/L    GFR MDRD Af Amer >60 >60 mL/min/1.73m2    GFR MDRD Non Af Amer >60 >60 mL/min/1.73m2   Comprehensive Metabolic Panel   Result Value Ref Range    Sodium 141 136 - 145 mmol/L    Potassium 3.7 3.5 - 5.0 mmol/L    Chloride 97 (L) 98 - 107 mmol/L    CO2 31 22 - 31 mmol/L    Anion Gap,  Calculation 13 5 - 18 mmol/L    Glucose 143 (H) 70 - 125 mg/dL    BUN 31 (H) 8 - 28 mg/dL    Creatinine 0.85 0.70 - 1.30 mg/dL    GFR MDRD Af Amer >60 >60 mL/min/1.73m2    GFR MDRD Non Af Amer >60 >60 mL/min/1.73m2    Bilirubin, Total 0.5 0.0 - 1.0 mg/dL    Calcium 6.6 (L) 8.5 - 10.5 mg/dL    Protein, Total 5.1 (L) 6.0 - 8.0 g/dL    Albumin 2.4 (L) 3.5 - 5.0 g/dL    Alkaline Phosphatase 143 (H) 45 - 120 U/L    AST 41 (H) 0 - 40 U/L    ALT 36 0 - 45 U/L   HM1 (CBC with Diff)   Result Value Ref Range    WBC 9.9 4.0 - 11.0 thou/uL    RBC 2.75 (L) 4.40 - 6.20 mill/uL    Hemoglobin 8.4 (L) 14.0 - 18.0 g/dL    Hematocrit 26.6 (L) 40.0 - 54.0 %    MCV 97 80 - 100 fL    MCH 30.5 27.0 - 34.0 pg    MCHC 31.6 (L) 32.0 - 36.0 g/dL    RDW 16.4 (H) 11.0 - 14.5 %    Platelets 156 140 - 440 thou/uL    MPV 10.2 8.5 - 12.5 fL    Neutrophils % 91 (H) 50 - 70 %    Lymphocytes % 5 (L) 20 - 40 %    Monocytes % 4 2 - 10 %    Eosinophils % 0 0 - 6 %    Basophils % 0 0 - 2 %    Neutrophils Absolute 8.7 (H) 2.0 - 7.7 thou/uL    Lymphocytes Absolute 0.5 (L) 0.8 - 4.4 thou/uL    Monocytes Absolute 0.4 0.0 - 0.9 thou/uL    Eosinophils Absolute 0.0 0.0 - 0.4 thou/uL    Basophils Absolute 0.0 0.0 - 0.2 thou/uL   Calcium, Ionized, Measured   Result Value Ref Range    Calcium, Ionized Measured 0.84 (L) 1.11 - 1.30 mmol/L    Calcium, Ionized pH 7.4 0.85 (L) 1.11 - 1.30 mmol/L    pH 7.42 7.35 - 7.45   Phosphorus Level > 2.4 no replacement required   Result Value Ref Range    Phosphorus 2.5 2.5 - 4.5 mg/dL   Basic Metabolic Panel   Result Value Ref Range    Sodium 139 136 - 145 mmol/L    Potassium 3.5 3.5 - 5.0 mmol/L    Chloride 97 (L) 98 - 107 mmol/L    CO2 32 (H) 22 - 31 mmol/L    Anion Gap, Calculation 10 5 - 18 mmol/L    Glucose 147 (H) 70 - 125 mg/dL    Calcium 6.8 (L) 8.5 - 10.5 mg/dL    BUN 30 (H) 8 - 28 mg/dL    Creatinine 0.82 0.70 - 1.30 mg/dL    GFR MDRD Af Amer >60 >60 mL/min/1.73m2    GFR MDRD Non Af Amer >60 >60 mL/min/1.73m2    Hemoglobin   Result Value Ref Range    Hemoglobin 9.2 (L) 14.0 - 18.0 g/dL   Platelet Count - every other day x 3   Result Value Ref Range    Platelets 154 140 - 440 thou/uL   Potassium   Result Value Ref Range    Potassium 3.9 3.5 - 5.0 mmol/L   Magnesium   Result Value Ref Range    Magnesium 1.3 (L) 1.8 - 2.6 mg/dL   Phosphorus Level > 2.4 no replacement required   Result Value Ref Range    Phosphorus 1.8 (L) 2.5 - 4.5 mg/dL   Basic Metabolic Panel   Result Value Ref Range    Sodium 140 136 - 145 mmol/L    Potassium 3.9 3.5 - 5.0 mmol/L    Chloride 102 98 - 107 mmol/L    CO2 30 22 - 31 mmol/L    Anion Gap, Calculation 8 5 - 18 mmol/L    Glucose 97 70 - 125 mg/dL    Calcium 7.4 (L) 8.5 - 10.5 mg/dL    BUN 25 8 - 28 mg/dL    Creatinine 0.72 0.70 - 1.30 mg/dL    GFR MDRD Af Amer >60 >60 mL/min/1.73m2    GFR MDRD Non Af Amer >60 >60 mL/min/1.73m2   Basic Metabolic Panel   Result Value Ref Range    Sodium 144 136 - 145 mmol/L    Potassium 3.8 3.5 - 5.0 mmol/L    Chloride 108 (H) 98 - 107 mmol/L    CO2 26 22 - 31 mmol/L    Anion Gap, Calculation 10 5 - 18 mmol/L    Glucose 105 70 - 125 mg/dL    Calcium 7.4 (L) 8.5 - 10.5 mg/dL    BUN 18 8 - 28 mg/dL    Creatinine 0.76 0.70 - 1.30 mg/dL    GFR MDRD Af Amer >60 >60 mL/min/1.73m2    GFR MDRD Non Af Amer >60 >60 mL/min/1.73m2   Hemoglobin   Result Value Ref Range    Hemoglobin 9.8 (L) 14.0 - 18.0 g/dL   Magnesium   Result Value Ref Range    Magnesium 1.2 (L) 1.8 - 2.6 mg/dL         Assessment/Plan:  1. Acute respiratory failure (H)     2. Cryptogenic organizing pneumonia (H)     3. Lung cancer metastatic to bone (H)     4. Diastolic congestive heart failure (H)     5. Chronic anemia     6. Low magnesium levels     7. Hypokalemia     8. Anxiety disorder       Patient with acute respiratory failure secondary to COPD exacerbation, COPD, lung cancer with metastases and diastolic congestive heart failure.  Due to cryptogenic nature with severe obstructive lung disease: Patient  will be on prednisone 40 mg daily for 6 weeks then decreased to 30 mg daily until seen in follow-up with pulmonologist, Dr. Blue in 8 weeks who will adjust dose of prednisone thereafter.  Primary reason for prolonged taper and elevated dose of prednisone related to possible cryptogenic organizing pneumonia, so not on this regimen for COPD.  Lung CA with metastases.  He has underwent 2 doses of Keytruda.  He wants to continue treatment.  He is not interested in palliative care.Patient to follow-up with Dr. Li, [facility] [place] oncology as scheduled in 1-2 weeks.  Increased anxiety during hospitalization.  He does continue on Paxil.  Remeron was added during hospitalization.  He reports anxiety today.  He is asking me to increase this.  We will do a increasing Remeron to 15 mg nightly.  Patient to follow-up with oncology psychologist after discharge with [facility] [place], Karen Dang.  Chronic pain secondary to bone metastases.  Continues on morphine 15 mg twice daily as well as oxycodone for breakthrough pain.  Will increase to 20 mg twice daily extended release.  Patient continues on O2.  Will attempt to wean per pulmonology.  O2 sats can be at 88%.  He continues with shortness of breath and coughing up thick green phlegm.  He tells me that shortness of breath is better with the oxygen.  Electrolyte imbalance.  He is continued on potassium and magnesium supplement.  We will continue to monitor laboratory.  He is very eager to discharge.  I did discuss his prognosis at length.  He has a follow-up with oncology which I think might prove to be very helpful with overall prognosis.  Would hope we can attempt to strengthen him while at the TCU.    60 minutes spent of which greater than 50% was face to face communication with the patient about above plan of care    Electronically signed by: Angélica Khanna, CNP

## 2021-06-19 NOTE — PROGRESS NOTES
Assessment/Plan:       1. Hypokalemia  Patient was hyperkalemic for a time in the hospital, then became hypokalemic.  This has normalized now.  His kidney function is normal as well.  - Basic Metabolic Panel    2. Hypomagnesemia  Patient remains slightly hypomagnesemic but also hypocalcemic.  Discussed continuing his multivitamin along with getting back into a regular diet.  - Magnesium    3. Stage 4 malignant neoplasm of lung, unspecified laterality (H)  Patient follows closely with Dr. Li with [facility] cancer care.  Discussion is being had about continuation of chemotherapy versus goals of care as patient does not seem to be tolerating this well.    4. Cancer associated pain  It remains unclear whether patient is using his pain medications appropriately at home.  He has home care nursing now but it does not sound as though they set up his medications.  He thinks he has been taking his MS Contin once daily at bedtime and is unsure how frequently he has been taking his as needed oxycodone.  He usually takes 2 of these at a time.  Again emphasized setting up MS Contin in his pillbox and using this regularly, keeping oxycodone on a pad of paper and writing down dose and time that he takes this medication.  I am reluctant to change his pain medications if we are unsure what he is actually taking at home.  - morphine (MS CONTIN) 15 MG 12 hr tablet; Take 1 tablet (15 mg total) by mouth 2 (two) times a day.  Dispense: 60 tablet; Refill: 0  - oxyCODONE (ROXICODONE) 5 MG immediate release tablet; Take 1-2 tablets (5-10 mg total) by mouth every 6 (six) hours as needed.  Dispense: 90 tablet; Refill: 0    5. Pneumonia of right lower lobe due to infectious organism (H)  Patient again had an infiltrate suspicious for pneumonia.  This was stable from previous.  He completed antibiotics prior to discharge.    6. Acute renal failure, unspecified acute renal failure type (H)  Patient states he has been eating and drinking  normally.  Recheck metabolic profile today reveals normal kidney function.    7. Leukocytosis, unspecified type  This is increased slightly from previous.  Unclear etiology.  He had workup in the hospital including chest x-ray, blood cultures, CT abdomen and pelvis with no obvious cause of leukocytosis.  Patient will be attending to any new signs or symptoms of infection, pain, fevers, localizing symptoms and follow-up as needed.  - HM1(CBC and Differential)  - HM1 (CBC with Diff)        Subjective:       Christian Rees is a 68 y.o. male who presents for hospital follow-up.  Patient was most recently hospitalized from 6/30 to 7/6/18 with shortness of breath and altered mental status.  His presenting concerns were similar to his previous admission.  He was found to be in hypoxic respiratory failure, thought due to a combination of pneumonia and respiratory depression related to pain medications.  Chest x-ray was concerning for continued right-sided pneumonia.  Patient also had some acute diarrhea and stool testing was negative for C. difficile.  His encephalopathy improved with IV hydration and holding MS Contin.  His renal insufficiency resolved with IV fluids.  His respiratory failure improved rapidly with O2.  Today patient states that he feels well with the exception of bilateral hip pain.  He did mention this in the hospital as well, and an x-ray was performed of his left hip which was negative for fracture or metastasis.  He presents with his wife today.  His wife and daughter-in-law help him set up his medications, but his wife is unable to describe how he takes his pain medication.  She thinks he takes his MS Contin once daily at bedtime.  They were under the impression that the clonazepam was a pain medication.  They are also unable to describe how patient takes his oxycodone.  It sounds as though he takes 2 pills at a time, but they are unsure how he decides when to take this, or how he keeps track of  when he last took this.  In terms of his breathing, he states this is back to baseline.  He has been using a Breo inhaler daily he states.  Patient does think he has follow-up scheduled with palliative care through oncology.    Imaging Reviewed:  X-ray hip left 7/4/18: Prior left total hip arthroplasty, alignment normal, no fractures evident    CT head without contrast 6/30/18: No change, no acute hemorrhage, midline shift, or mass-effect.    CT chest without contrast 6/30/18: Groundglass opacities right lower lobe have slightly progressed.  Right pleural effusion is slightly smaller and small left pleural effusion is unchanged.  Stable bilateral pulmonary nodules and skeletal metastases.    CT abdomen and pelvis with oral and IV contrast 7/6/18: Sigmoid diverticulosis without evidence for diverticulitis.  No evidence of appendicitis.    Labs Reviewed:  7/6/18: WBC 14.8, hemoglobin 10.6, platelet 205, magnesium 1.5, sodium 136, potassium 3.9, chloride 101, CO2 26, BUN 12, creatinine 0.7, glucose 103      The following portions of the patient's history were reviewed and updated as appropriate: allergies, current medications, past family history, past medical history, past social history, past surgical history and problem list.      Current Outpatient Prescriptions:      acetaminophen (TYLENOL) 500 MG tablet, Take 2 tablets (1,000 mg total) by mouth 3 (three) times a day. (Patient taking differently: Take 1,000 mg by mouth 3 (three) times a day as needed. ), Disp: , Rfl: 0     albuterol (PROAIR HFA;PROVENTIL HFA;VENTOLIN HFA) 90 mcg/actuation inhaler, Inhale 2 puffs every 6 (six) hours as needed for wheezing., Disp: 1 each, Rfl: 3     aspirin 81 MG EC tablet, Take 81 mg by mouth daily., Disp: , Rfl:      calcium-vitamin D 500 mg(1,250mg) -200 unit per tablet, Take 1 tablet by mouth daily., Disp: , Rfl: 0     carvedilol (COREG) 6.25 MG tablet, Take 1 tablet (6.25 mg total) by mouth 2 (two) times a day with meals.,  Disp: 60 tablet, Rfl: 0     celecoxib (CELEBREX) 200 MG capsule, Take 1 capsule (200 mg total) by mouth daily., Disp: 30 capsule, Rfl: 5     clonazePAM (KLONOPIN) 0.5 MG tablet, Take 1 tablet (0.5 mg total) by mouth at bedtime., Disp: 15 tablet, Rfl: 0     cyanocobalamin 1,000 mcg/mL injection, Inject 1,000 mcg into the shoulder, thigh, or buttocks every 30 (thirty) days., Disp: , Rfl:      docusate sodium (COLACE) 100 MG capsule, Take 1 capsule (100 mg total) by mouth 2 (two) times a day as needed for constipation., Disp: , Rfl: 0     fluticasone-umeclidinium-vilanterol (TRELEGY ELLIPTA) 100-62.5-25 mcg DsDv inhaler, Inhale 1 Inhalation daily., Disp: 1 each, Rfl: 12     folic acid (FOLVITE) 400 MCG tablet, Take 400 mcg by mouth daily., Disp: , Rfl:      furosemide (LASIX) 40 MG tablet, TAKE ONE TABLET BY MOUTH EVERY DAY, Disp: 90 tablet, Rfl: 3     ipratropium-albuterol (DUO-NEB) 0.5-2.5 mg/3 mL nebulizer, Take 3 mL by nebulization as needed., Disp: , Rfl:      lidocaine 4 % patch, Place 1 patch on the skin daily as needed for pain. Remove and discard patch with 12 hours or as directed by MD., Disp: 5 patch, Rfl: 0     morphine (MS CONTIN) 15 MG 12 hr tablet, Take 1 tablet (15 mg total) by mouth 2 (two) times a day., Disp: 30 tablet, Rfl: 0     multivitamin (MULTIVITAMIN) per tablet, Take 1 tablet by mouth daily., Disp: , Rfl:      omeprazole (PRILOSEC) 20 MG capsule, Take 20 mg by mouth daily before breakfast. , Disp: , Rfl:      oxyCODONE (ROXICODONE) 5 MG immediate release tablet, Take 1-2 tablets (5-10 mg total) by mouth every 6 (six) hours as needed., Disp: 90 tablet, Rfl: 0     PARoxetine (PAXIL) 30 MG tablet, Take 1 tablet (30 mg total) by mouth every morning., Disp: , Rfl:     Review of Systems   A 12 point comprehensive review of systems was negative except as noted.      Objective:      /72  Pulse 63  Temp 97.9  F (36.6  C) (Temporal)   Wt 180 lb (81.6 kg) Comment: Verbal from patient  SpO2 96%   BMI 28.62 kg/m2    General appearance: alert, appears stated age and cooperative  Head: Normocephalic, without obvious abnormality, atraumatic  Eyes: Conjunctivae clear, sclerae anicteric  Lungs: Diminished bilaterally, consistent with previous, no rhonchi or wheeze  Heart: regular rate and rhythm, S1, S2 normal, no murmur, click, rub or gallop  Abdomen: soft, non-tender; bowel sounds normal; no masses,  no organomegaly  Extremities: 2+ pitting edema to mid calf bilaterally, no erythema, no asymmetry  Neurologic: Grossly normal  Psychiatric: Speech is slightly slowed, thought process mildly slowed as well, alert and oriented ×3, patient describes anxious mood        Results for orders placed or performed in visit on 07/13/18   Basic Metabolic Panel   Result Value Ref Range    Sodium 137 136 - 145 mmol/L    Potassium 4.7 3.5 - 5.0 mmol/L    Chloride 103 98 - 107 mmol/L    CO2 23 22 - 31 mmol/L    Anion Gap, Calculation 11 5 - 18 mmol/L    Glucose 121 70 - 125 mg/dL    Calcium 7.8 (L) 8.5 - 10.5 mg/dL    BUN 15 8 - 28 mg/dL    Creatinine 0.77 0.70 - 1.30 mg/dL    GFR MDRD Af Amer >60 >60 mL/min/1.73m2    GFR MDRD Non Af Amer >60 >60 mL/min/1.73m2   Magnesium   Result Value Ref Range    Magnesium 1.5 (L) 1.8 - 2.6 mg/dL   HM1 (CBC with Diff)   Result Value Ref Range    WBC 13.0 (H) 4.0 - 11.0 thou/uL    RBC 2.87 (L) 4.40 - 6.20 mill/uL    Hemoglobin 9.0 (L) 14.0 - 18.0 g/dL    Hematocrit 28.9 (L) 40.0 - 54.0 %     (H) 80 - 100 fL    MCH 31.4 27.0 - 34.0 pg    MCHC 31.1 (L) 32.0 - 36.0 g/dL    RDW 15.8 (H) 11.0 - 14.5 %    Platelets 230 140 - 440 thou/uL    MPV 11.6 8.5 - 12.5 fL    Neutrophils % 78 (H) 50 - 70 %    Lymphocytes % 10 (L) 20 - 40 %    Monocytes % 6 2 - 10 %    Eosinophils % 6 0 - 6 %    Basophils % 0 0 - 2 %    Neutrophils Absolute 10.0 (H) 2.0 - 7.7 thou/uL    Lymphocytes Absolute 1.3 0.8 - 4.4 thou/uL    Monocytes Absolute 0.8 0.0 - 0.9 thou/uL    Eosinophils Absolute 0.7 (H) 0.0 - 0.4 thou/uL     Basophils Absolute 0.1 0.0 - 0.2 thou/uL

## 2021-06-19 NOTE — PROGRESS NOTES
.jl  Code Status:  Full Code  Visit Type: Problem Visit     Facility:  [facility] [[id number]]         Facility Type: SNF (Skilled Nursing Facility, TCU)    History of Present Illness: Christian Rees is a 68 y.o. male who I am seeing today for follow-up on the TCU.  Patient with a history of stage IV adenocarcinoma, COPD, atrial fib recently hospitalized with increasing shortness of breath, cough or leg swelling.  Patient admitted with acute hypoxic respiratory failure.  It was felt that he had a COPD exacerbation of systolic heart failure in the setting of toxic lung cancer as well as pneumonia. Chest x-ray on admission showed a persistent patchy infiltrate of the mid to lower right lung with small left pleural effusion, multiple bilateral metastatic pulmonary nodules and right-sided pathologic rib fractures.  Of concern was repeat CT Chest 7/18 as outlined below and concern for cryptogenic organizing pneumonia for which pulmonology was consulted.  Pro-calcitonin elevated to 1.4 which was previously normal.  Sputum culture showed usual tanisha.  However pulmonary recommended to treat with prednisone 40 mg daily ×6 weeks then 30 mg daily ×8 weeks.  He was also treated with Bactrim as well as Augmentin.  Wn off oxygen as able to goal SPO2 88%. He continues on nebulizer. Acute/diastolic heart failure: improved.  on admission compared to 497 on 6/30/18.  Symptoms of orthopnea, dyspnea and leg swelling consistent with mild exacerbation.  Improved with IV Lasix. Discharged on oral Lasix 40mg. Paroxysmal atrial fibrillation, frequent atrial ectopy: On aspirin. Unfamiliar with the details regarding any discussion of stronger anticoagulation such as warfarin or equivalent long-term as reduction in the risk of thromboembolic event which in this patient is elevated. Pt continues carvedilol and aspirin. Anemia. He did undergo transfusion 1U PRBC 7/18.  Stage IV metastatic lung cancer to lymph nodes  and bone: Status post second dose of Keytruda 1 week ago.  This is patient's third or fourth hospitalization in the last 6 weeks.  Palliative care was consulted and patient remains full code. Patient does wish to consider possible additional therapy with alternative agent if able. Malignant related bone pain: Thoracic spine, ribs.  Continue MS Contin 15 mg every 12, oxycodone 5 mg every 6 hours as needed for breakthrough pain.  Continue Tylenol and lidocaine patch. Bilateral lower extremity edema: Venous duplex ultrasound negative for DVT.  Improved with IV diuretics.     Anxiety disorder: Psychiatry consulted.  Paxil increased to 40 mg daily.  Mirtazapine 7.5 mg at bedtime initiated at bedtime. Scheduled clonazepam once a day continued per his chronic regimen. Patient to follow-up with oncology psychologist after discharge with [facility], Karen Dang. 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 No evidence for pathologic fracture in this region.  Venous duplex ultrasound on 7/16 for DVT.    Repeat x-ray right hip on 7/20/18 showed postop bilateral total hip arthroplasties with pelvis and right hip otherwise negative without fractures or dislocations. Oral candidiasis treated with nystatin swish and swallow.   Hypokalemia, hypomagnesemia, hypocalcemia: replaced.  Optimize nutritional status. On potassium supplementation at discharge as well as magnesium supplementation. Moderate protein calorie malnutrition: Continue with Magic cup twice daily with meals.  Encourage nutrition at TCU.  Recommend dietitian follow a TCU.    Today patient lying in bed.  His nephew is present on visit. He continues with pain in his lower back and right thigh.  He tells me today the pool therapy is helpful.  He continues on morphine 30 mg twice daily as well as oxycodone for  breakthrough pain.  He does report increased anxiety today.  He is taking clonazepam at bedtime.  He is asking for more.  I did increase his mirtazapine during his TCU stay.  He continues on Paxil as well.  Continues with shortness of breath.  On O2 at 2 L nasal cannula.    Active Ambulatory Problems     Diagnosis Date Noted     Generalized Anxiety Disorder      Right Bundle Branch Block With Left Anterior Fascicular Block      SOB (shortness of breath)      Familial (Benign Essential) Tremor      Mixed hyperlipidemia      HTN (hypertension)      CHF (congestive heart failure) (H)      Leukocytosis, unspecified type      Dizziness      Hypokalemia      Pigmented Nevus      Joint Pain, Localized In The Shoulder      Normocytic anemia      Edema      Joint Pain, Localized In The Hip      Idiopathic chronic gout without tophus, unspecified site      Excessive Sweating      Lung cancer (H) 07/25/2014     B12 deficiency 10/13/2014     Malignant neoplasm metastatic to lung, unspecified laterality (H) 01/22/2015     Hypomagnesemia 06/27/2016     CAD (coronary artery disease) 02/10/2017     Venous hypertension of both lower extremities 02/15/2017     Postthrombotic syndrome 02/15/2017     Acquired lymphedema of leg 02/15/2017     Vitamin D deficiency 02/15/2017     Chronic diastolic heart failure (H) 02/23/2017     Functional diarrhea 10/02/2017     Controlled substance agreement signed 10/02/2017     Obstructive sleep apnea syndrome 03/14/2018     Chronic right-sided low back pain without sciatica 05/11/2018     Elevated liver enzymes 05/11/2018     Stage 4 lung cancer, unspecified laterality (H) 06/04/2018     Cancer associated pain      Generalized weakness      Encounter for palliative care      Mood disorder due to medical condition      Depression with anxiety      Anxiety in acute stress reaction      Sleeping difficulty      Acute exacerbation of CHF (congestive heart failure) (H) 06/25/2018     Acute kidney injury  (H)      Hyperkalemia 06/30/2018     Acute respiratory failure with hypoxia (H)      Acute encephalopathy      History of CHF (congestive heart failure)      Goals of care, counseling/discussion      Acute renal failure, unspecified acute renal failure type (H)      Pneumonia of right lower lobe due to infectious organism (H)      Respiratory distress 07/16/2018     Cryptogenic organizing pneumonia (H)      Adjustment disorder with anxious mood      Encounter for antineoplastic chemotherapy 07/31/2018     Resolved Ambulatory Problems     Diagnosis Date Noted     Diarrhea      Closed Posterior Dislocation Of The Hip      Arthropathy Of The Ankle / Foot      Essential Hypertriglyceridemia      Abdominal Pain      Acute Gout      Chest Pain      Persistent Atrial Fibrillation      Headache 12/23/2014     Snoring 12/23/2014     Anxiety 04/11/2018     Dyspnea 04/11/2018     Healthcare-associated pneumonia      Past Medical History:   Diagnosis Date     Anxiety      Chronic back pain      Closed Posterior Dislocation Of The Hip      COPD (chronic obstructive pulmonary disease) (H) 03/20/2018     Depression      DVT (deep venous thrombosis) (H)      Gout      Heart failure (H)      Hypertension      Lung cancer (H)      Persistent Atrial Fibrillation        Reviewed in the record.    No Known Allergies      Review of Systems   No fevers or chills. No headache, lightheadedness or dizziness. Chronic shortness of breath however he feels it has improved with the use of oxygen, no chest pains or palpitations. He is coughing up green phelgm. Appetite is poor. No nausea, vomiting, constipation or diarrhea. No dysuria, frequency, burning or pain with urination. Chronic pain with complaints of back pain, bottom pain and right thigh/hip pain. He is requesting an increase in his pain meds. He also reports increased anxiety.  He is requesting more clonazepam.  He told me today that pool therapy is good.      Physical Exam   PHYSICAL  EXAMINATION:  Vital signs: /77, heart rate 65, respirations 18, temperature 96.8, 99% on 2 L.  Current weight 176 pounds.  General: Awake, Alert, oriented x3, appropriately, follows simple commands, conversant  HEENT:PERRLA, Pink conjunctiva, anicteric sclerae, moist oral mucosa  NECK: Supple, without any lymphadenopathy, or masses  CVS:  S1  S2, without murmur or gallop.   LUNG: Clear to auscultation, No wheezes, rales or rhonci.  Continues on O2 at 2 L nasal cannula.  No dyspnea at rest.  He is coughing up thick green phlegm.   BACK: No kyphosis of the thoracic spine  ABDOMEN: Soft, nontender to palpation, with positive bowel sounds  EXTREMITIES: Good range of motion on both upper and lower extremities with generalized weakness, 1+ pedal edema, no calf tenderness  SKIN: Warm and dry, no rashes or erythema noted  NEUROLOGIC: Intact, pulses palpable  PSYCHIATRIC: Cognition intact.  Anxious.            Labs:    Recent Results (from the past 240 hour(s))   Basic Metabolic Panel   Result Value Ref Range    Sodium 144 136 - 145 mmol/L    Potassium 3.8 3.5 - 5.0 mmol/L    Chloride 108 (H) 98 - 107 mmol/L    CO2 26 22 - 31 mmol/L    Anion Gap, Calculation 10 5 - 18 mmol/L    Glucose 105 70 - 125 mg/dL    Calcium 7.4 (L) 8.5 - 10.5 mg/dL    BUN 18 8 - 28 mg/dL    Creatinine 0.76 0.70 - 1.30 mg/dL    GFR MDRD Af Amer >60 >60 mL/min/1.73m2    GFR MDRD Non Af Amer >60 >60 mL/min/1.73m2   Hemoglobin   Result Value Ref Range    Hemoglobin 9.8 (L) 14.0 - 18.0 g/dL   Magnesium   Result Value Ref Range    Magnesium 1.2 (L) 1.8 - 2.6 mg/dL   Comprehensive Metabolic Panel   Result Value Ref Range    Sodium 140 136 - 145 mmol/L    Potassium 5.3 (H) 3.5 - 5.0 mmol/L    Chloride 103 98 - 107 mmol/L    CO2 27 22 - 31 mmol/L    Anion Gap, Calculation 10 5 - 18 mmol/L    Glucose 160 (H) 70 - 125 mg/dL    BUN 30 (H) 8 - 28 mg/dL    Creatinine 0.85 0.70 - 1.30 mg/dL    GFR MDRD Af Amer >60 >60 mL/min/1.73m2    GFR MDRD Non Af Amer >60  >60 mL/min/1.73m2    Bilirubin, Total 0.7 0.0 - 1.0 mg/dL    Calcium 8.5 8.5 - 10.5 mg/dL    Protein, Total 6.2 6.0 - 8.0 g/dL    Albumin 2.9 (L) 3.5 - 5.0 g/dL    Alkaline Phosphatase 211 (H) 45 - 120 U/L    AST 23 0 - 40 U/L    ALT 47 (H) 0 - 45 U/L   Magnesium   Result Value Ref Range    Magnesium 1.6 (L) 1.8 - 2.6 mg/dL   HM1 (CBC with Diff)   Result Value Ref Range    WBC 24.0 (H) 4.0 - 11.0 thou/uL    RBC 2.53 (L) 4.40 - 6.20 mill/uL    Hemoglobin 7.9 (L) 14.0 - 18.0 g/dL    Hematocrit 26.3 (L) 40.0 - 54.0 %     (H) 80 - 100 fL    MCH 31.2 27.0 - 34.0 pg    MCHC 30.0 (L) 32.0 - 36.0 g/dL    RDW 16.4 (H) 11.0 - 14.5 %    Platelets 213 140 - 440 thou/uL    MPV 10.4 8.5 - 12.5 fL    Neutrophils % 91 (H) 50 - 70 %    Lymphocytes % 4 (L) 20 - 40 %    Monocytes % 2 2 - 10 %    Eosinophils % 2 0 - 6 %    Basophils % 0 0 - 2 %    Neutrophils Absolute 21.1 (H) 2.0 - 7.7 thou/uL    Lymphocytes Absolute 1.1 0.8 - 4.4 thou/uL    Monocytes Absolute 0.6 0.0 - 0.9 thou/uL    Eosinophils Absolute 0.4 0.0 - 0.4 thou/uL    Basophils Absolute 0.1 0.0 - 0.2 thou/uL   BNP(B-type Natriuretic Peptide)   Result Value Ref Range     (H) 0 - 67 pg/mL   Iron and Transferrin Iron Binding Capacity   Result Value Ref Range    Iron 57 42 - 175 ug/dL    Transferrin 227 212 - 360 mg/dL    Transferrin Saturation, Calculated 20 20 - 50 %    Transferrin IBC, Calculated 284 (L) 313 - 563 ug/dL   Ferritin   Result Value Ref Range    Ferritin 1212 (H) 27 - 300 ng/mL   Reticulocytes   Result Value Ref Range    Retic Absolute Count 0.115 (H) 0.010 - 0.110 mill/uL   Folate, Serum   Result Value Ref Range    Folate 12.2 >=3.5 ng/mL   Vitamin B12   Result Value Ref Range    Vitamin B-12 536 213 - 816 pg/mL   Haptoglobin   Result Value Ref Range    Haptoglobin 323 (H) 33 - 171 mg/dL   Lactate Dehydrogenase (LDH)   Result Value Ref Range    LD (LDH) 234 (H) 125 - 220 U/L         Assessment/Plan:  1. Lung cancer metastatic to bone (H)     2.  Anxiety disorder     3. Pain management     4. Cryptogenic organizing pneumonia (H)     5. Acute respiratory failure (H)       Patient with lung CA with metastases to the bone.  Underlying COPD with recent exacerbation as well as diastolic congestive heart failure.  He does continue on antibiotic therapy and steroids for COPD and cryptogenic pneumonia.  Shortness of breath continues with exertion.  He is on O2 at 2 L nasal cannula.  He does continue to cough up some phlegm.  Chronic pain.  I have increase his morphine to 30 mg twice daily.  Continues on 5 mg of oxycodone every 6 hours.  However he is taking this every time.  Increased anxiety.  Continues on clonazepam at bedtime.  He is asking for an increase.  I will increase this to 2 times daily.  He is also on mirtazapine and Paxil.  I did increase his mirtazapine during his TCU stay.  He was seen by oncology.  He is contemplating restarting chemotherapy on the 14th.  He is undergoing port placement on 8 August.  He is continued with pool therapy.  He thinks this is helpful.  Overall deconditioning noted.      Patient with acute respiratory failure secondary to COPD exacerbation, COPD, lung cancer with metastases and diastolic congestive heart failure.  Due to cryptogenic nature with severe obstructive lung disease: Patient continues on large dose steroids for 6 weeks.  Is a follow-up with pulmonology in 8 weeks.  Today his biggest complaint is pain management.  Continues with pain in his lower back and his right thigh.  It is constant.  Patient denies any radiculopathy.  I recently increased his extended release morphine to 20 mg twice daily.  He is maximizing his oxycodone every 6 hours for breakthrough pain.  He is crying.  I will increase to 30 mg twice daily.  Will continue with oxycodone for breakthrough pain.  He does have anxiety.  I recently increase his mirtazapine.  He continues on Paxil.  He has Klonopin at bedtime.  He also has lidocaine patches for  his right hip and thigh as well as his lower back.  He continues on oxygen at 2 L.  He does have shortness of breath without the oxygen.        Electronically signed by: Agnélica Khanna CNP

## 2021-06-19 NOTE — PROGRESS NOTES
.jl  Code Status:  Full Code  Visit Type: Problem Visit     Facility:  [facility] [[id number]]         Facility Type: SNF (Skilled Nursing Facility, TCU)    History of Present Illness: Christian Rees is a 68 y.o. male who I am seeing today for follow-up on the TCU.  Patient with a history of stage IV adenocarcinoma, COPD, atrial fib recently hospitalized with increasing shortness of breath, cough or leg swelling.  Patient admitted with acute hypoxic respiratory failure.  It was felt that he had a COPD exacerbation of systolic heart failure in the setting of toxic lung cancer as well as pneumonia. Chest x-ray on admission showed a persistent patchy infiltrate of the mid to lower right lung with small left pleural effusion, multiple bilateral metastatic pulmonary nodules and right-sided pathologic rib fractures.  Of concern was repeat CT Chest 7/18 as outlined below and concern for cryptogenic organizing pneumonia for which pulmonology was consulted.  Pro-calcitonin elevated to 1.4 which was previously normal.  Sputum culture showed usual tanisha.  However pulmonary recommended to treat with prednisone 40 mg daily ×6 weeks then 30 mg daily ×8 weeks.  He was also treated with Bactrim as well as Augmentin.  Wn off oxygen as able to goal SPO2 88%. He continues on nebulizer. Acute/diastolic heart failure: improved.  on admission compared to 497 on 6/30/18.  Symptoms of orthopnea, dyspnea and leg swelling consistent with mild exacerbation.  Improved with IV Lasix. Discharged on oral Lasix 40mg. Paroxysmal atrial fibrillation, frequent atrial ectopy: On aspirin. Unfamiliar with the details regarding any discussion of stronger anticoagulation such as warfarin or equivalent long-term as reduction in the risk of thromboembolic event which in this patient is elevated. Pt continues carvedilol and aspirin. Anemia. He did undergo transfusion 1U PRBC 7/18.  Stage IV metastatic lung cancer to lymph nodes  and bone: Status post second dose of Keytruda 1 week ago.  This is patient's third or fourth hospitalization in the last 6 weeks.  Palliative care was consulted and patient remains full code. Patient does wish to consider possible additional therapy with alternative agent if able. Malignant related bone pain: Thoracic spine, ribs.  Continue MS Contin 15 mg every 12, oxycodone 5 mg every 6 hours as needed for breakthrough pain.  Continue Tylenol and lidocaine patch. Bilateral lower extremity edema: Venous duplex ultrasound negative for DVT.  Improved with IV diuretics.     Anxiety disorder: Psychiatry consulted.  Paxil increased to 40 mg daily.  Mirtazapine 7.5 mg at bedtime initiated at bedtime. Scheduled clonazepam once a day continued per his chronic regimen. Patient to follow-up with oncology psychologist after discharge with [facility], Karen Dang. 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 No evidence for pathologic fracture in this region.  Venous duplex ultrasound on 7/16 for DVT.    Repeat x-ray right hip on 7/20/18 showed postop bilateral total hip arthroplasties with pelvis and right hip otherwise negative without fractures or dislocations. Oral candidiasis treated with nystatin swish and swallow.   Hypokalemia, hypomagnesemia, hypocalcemia: replaced.  Optimize nutritional status. On potassium supplementation at discharge as well as magnesium supplementation. Moderate protein calorie malnutrition: Continue with Magic cup twice daily with meals.  Encourage nutrition at TCU.  Recommend dietitian follow a TCU.    Today patient lying in bed crying.  He reports increased pain in his lower back and right thigh.  Workup was done extensively during hospitalization in regards to pain in his right thigh which was thought to be a malignancy pain.  I did recently  increase his extended release morphine to 20 mg twice daily.  He has oxycodone for breakthrough pain.  He is maximizing this.  He does have underlying anxiety.  Continues on Paxil with recent increase in his Remeron.  He also has clonazepam at bedtime.  He continues on large dose of steroids for 6 weeks.  He continues on oxygen at 2 L nasal cannula.  He does have shortness of breath without the use of his oxygen.      Active Ambulatory Problems     Diagnosis Date Noted     Generalized Anxiety Disorder      Right Bundle Branch Block With Left Anterior Fascicular Block      SOB (shortness of breath)      Familial (Benign Essential) Tremor      Mixed hyperlipidemia      HTN (hypertension)      CHF (congestive heart failure) (H)      Leukocytosis, unspecified type      Dizziness      Hypokalemia      Pigmented Nevus      Joint Pain, Localized In The Shoulder      Normocytic anemia      Edema      Joint Pain, Localized In The Hip      Idiopathic chronic gout without tophus, unspecified site      Excessive Sweating      Lung cancer (H) 07/25/2014     B12 deficiency 10/13/2014     Malignant neoplasm metastatic to lung, unspecified laterality (H) 01/22/2015     Hypomagnesemia 06/27/2016     CAD (coronary artery disease) 02/10/2017     Venous hypertension of both lower extremities 02/15/2017     Postthrombotic syndrome 02/15/2017     Acquired lymphedema of leg 02/15/2017     Vitamin D deficiency 02/15/2017     Chronic diastolic heart failure (H) 02/23/2017     Functional diarrhea 10/02/2017     Controlled substance agreement signed 10/02/2017     Obstructive sleep apnea syndrome 03/14/2018     Chronic right-sided low back pain without sciatica 05/11/2018     Elevated liver enzymes 05/11/2018     Stage 4 lung cancer, unspecified laterality (H) 06/04/2018     Cancer associated pain      Generalized weakness      Encounter for palliative care      Mood disorder due to medical condition      Depression with anxiety      Anxiety  in acute stress reaction      Sleeping difficulty      Acute exacerbation of CHF (congestive heart failure) (H) 06/25/2018     Acute kidney injury (H)      Hyperkalemia 06/30/2018     Acute respiratory failure with hypoxia (H)      Acute encephalopathy      History of CHF (congestive heart failure)      Goals of care, counseling/discussion      Acute renal failure, unspecified acute renal failure type (H)      Pneumonia of right lower lobe due to infectious organism (H)      Respiratory distress 07/16/2018     Cryptogenic organizing pneumonia (H)      Adjustment disorder with anxious mood      Resolved Ambulatory Problems     Diagnosis Date Noted     Diarrhea      Closed Posterior Dislocation Of The Hip      Arthropathy Of The Ankle / Foot      Essential Hypertriglyceridemia      Abdominal Pain      Acute Gout      Chest Pain      Persistent Atrial Fibrillation      Headache 12/23/2014     Snoring 12/23/2014     Anxiety 04/11/2018     Dyspnea 04/11/2018     Healthcare-associated pneumonia      Past Medical History:   Diagnosis Date     Anxiety      Chronic back pain      Closed Posterior Dislocation Of The Hip      COPD (chronic obstructive pulmonary disease) (H) 03/20/2018     Depression      DVT (deep venous thrombosis) (H)      Gout      Heart failure (H)      Hypertension      Lung cancer (H)      Persistent Atrial Fibrillation        Current Outpatient Prescriptions   Medication Sig     acetaminophen (TYLENOL) 500 MG tablet Take 1,000 mg by mouth 3 (three) times a day as needed for pain.     albuterol (PROAIR HFA;PROVENTIL HFA;VENTOLIN HFA) 90 mcg/actuation inhaler Inhale 2 puffs every 6 (six) hours as needed for wheezing. (Patient taking differently: Inhale 2 puffs every 6 (six) hours as needed for wheezing. )     amoxicillin-clavulanate (AUGMENTIN) 875-125 mg per tablet Take 1 tablet by mouth 2 (two) times a day for 5 days.     aspirin 81 MG EC tablet Take 81 mg by mouth daily.      bisacodyl (DULCOLAX) 10 mg  suppository Insert 1 suppository (10 mg total) into the rectum daily as needed (constipation).     carvedilol (COREG) 6.25 MG tablet Take 1 tablet (6.25 mg total) by mouth 2 (two) times a day with meals. (Patient taking differently: Take 6.25 mg by mouth 2 (two) times a day with meals. )     clonazePAM (KLONOPIN) 0.5 MG tablet Take 1 tablet (0.5 mg total) by mouth at bedtime.     cyanocobalamin 1,000 mcg/mL injection Inject 1,000 mcg into the shoulder, thigh, or buttocks every 30 (thirty) days.      fluticasone-vilanterol (BREO ELLIPTA) 200-25 mcg/dose DsDv inhaler Inhale 1 puff daily.      furosemide (LASIX) 40 MG tablet Take 1 tablet (40 mg total) by mouth 2 (two) times a day at 9am and 6pm. X 5 days then change to qam only     ipratropium-albuterol (DUO-NEB) 0.5-2.5 mg/3 mL nebulizer Take 3 mL by nebulization 4 (four) times a day.     Lactobacillus acidoph-L.bulgar (LACTINEX) 100 million cell packet Take 1 packet by mouth 3 (three) times a day with meals for 5 days.     lidocaine 4 % patch Place 1 patch on the skin daily as needed for pain. Remove and discard patch with 12 hours or as directed by MD.     mirtazapine (REMERON) 7.5 MG tablet Take 1 tablet (7.5 mg total) by mouth at bedtime.     morphine (MS CONTIN) 15 MG 12 hr tablet Take 1 tablet (15 mg total) by mouth 2 (two) times a day.     nystatin (MYCOSTATIN) 100,000 unit/mL suspension Take 5 mL (500,000 Units total) by mouth 4 (four) times a day for 7 days.     omeprazole (PRILOSEC) 20 MG capsule Take 20 mg by mouth daily before breakfast.      oxyCODONE (ROXICODONE) 5 MG immediate release tablet Take 1 tablet (5 mg total) by mouth every 6 (six) hours as needed.     PARoxetine (PAXIL) 40 MG tablet Take 1 tablet (40 mg total) by mouth every morning.     polyethylene glycol (MIRALAX) 17 gram packet Take 1 packet (17 g total) by mouth daily as needed.     potassium chloride (KLOR-CON) 10 MEQ CR tablet Take 2 tablets (20 mEq total) by mouth 2 (two) times a day.  With food.  OK to crush and mix with food     predniSONE (DELTASONE) 20 MG tablet Take 2 tablets (40 mg total) by mouth daily with breakfast.     [START ON 9/5/2018] predniSONE (DELTASONE) 20 MG tablet Take 1.5 tablets (30 mg total) by mouth daily.     senna-docusate (PERICOLACE) 8.6-50 mg tablet Take 2 tablets by mouth daily.     sulfamethoxazole-trimethoprim (SEPTRA) 400-80 mg per tablet Take 1 tablet by mouth daily.       No Known Allergies      Review of Systems   No fevers or chills. No headache, lightheadedness or dizziness. Chronic shortness of breath however he feels it has improved with the use of oxygen, no chest pains or palpitations. He is coughing up green phelgm. Appetite is poor. No nausea, vomiting, constipation or diarrhea. No dysuria, frequency, burning or pain with urination. Chronic pain with complaints of back pain, bottom pain and right thigh/hip pain. He is requesting an increase in his pain meds. He also reports anxiety and claustrophobia.        Physical Exam   PHYSICAL EXAMINATION:  Vital signs: /73, heart rate 77, respirations 16, temperature 98.3, O2 sat 99% on 2 L, current weight 176 pounds.  General: Awake, Alert, oriented x3, appropriately, follows simple commands, conversant  HEENT:PERRLA, Pink conjunctiva, anicteric sclerae, moist oral mucosa  NECK: Supple, without any lymphadenopathy, or masses  CVS:  S1  S2, without murmur or gallop.   LUNG: Clear to auscultation, No wheezes, rales or rhonci.  Continues on O2 at 2 L nasal cannula.  No dyspnea at rest.  He is coughing up thick green.  BACK: No kyphosis of the thoracic spine  ABDOMEN: Soft, nontender to palpation, with positive bowel sounds  EXTREMITIES: Good range of motion on both upper and lower extremities with generalized weakness, 1+ pedal edema, no calf tenderness  SKIN: Warm and dry, no rashes or erythema noted  NEUROLOGIC: Intact, pulses palpable  PSYCHIATRIC: Cognition intact.  Anxious.            Labs:    Recent  Results (from the past 240 hour(s))   Blood culture #1   Result Value Ref Range    Anaerobic Blood Culture Bottle Specimen not received No Growth, No organisms seen, bottle returned to instrument, Specimen not received    Aerobic Blood Culture Bottle No Growth No Growth, No organisms seen, bottle returned to instrument, Specimen not received   Blood Gases, Arterial   Result Value Ref Range    pH, Arterial 7.29 (L) 7.37 - 7.44    pCO2, Arterial 52 (H) 35 - 45 mm Hg    pO2, Arterial 75 75 - 85 mm Hg    Bicarbonate, Arterial Calc 23.5 23.0 - 29.0 mmol/L    O2 Sat, Arterial 97.2 (H) 95.0 - 96.0 %    Oxyhemoglobin 93.6 (L) 95.0 - 96.0 %    Base Excess, Arterial Calc -1.8 mmol/L    Ventilation Mode Nasal cannula     Flow 2.0 LPM    Sample Stabilized Temperature 37.0 degrees C   Procalcitonin   Result Value Ref Range    Procalcitonin 1.55 (H) 0.00 - 0.49 ng/mL   Sputum culture   Result Value Ref Range    Culture Usual Kristy     Gram Stain Result 4+ Polymorphonuclear leukocytes     Gram Stain Result 3+ Gram positive cocci in pairs     Gram Stain Result 2+ Yeast     Gram Stain Result 2+ Gram positive bacilli, diphtheroid like    Troponin I   Result Value Ref Range    Troponin I 0.21 0.00 - 0.29 ng/mL   Blood Gases, Arterial   Result Value Ref Range    pH, Arterial 7.34 (L) 7.37 - 7.44    pCO2, Arterial 47 (H) 35 - 45 mm Hg    pO2, Arterial 72 (L) 75 - 85 mm Hg    Bicarbonate, Arterial Calc 24.5 23.0 - 29.0 mmol/L    O2 Sat, Arterial 97.2 (H) 95.0 - 96.0 %    Oxyhemoglobin 93.6 (L) 95.0 - 96.0 %    Base Excess, Arterial Calc -0.5 mmol/L    Ventilation Mode Nasal cannula     Flow 2.0 LPM    Sample Stabilized Temperature 37.0 degrees C   Troponin I   Result Value Ref Range    Troponin I 0.22 0.00 - 0.29 ng/mL   Magnesium   Result Value Ref Range    Magnesium 1.1 (L) 1.8 - 2.6 mg/dL   Renal Function Profile   Result Value Ref Range    Albumin 2.3 (L) 3.5 - 5.0 g/dL    Calcium 7.3 (L) 8.5 - 10.5 mg/dL    Phosphorus 3.1 2.5 - 4.5  mg/dL    Glucose 184 (H) 70 - 125 mg/dL    BUN 30 (H) 8 - 28 mg/dL    Creatinine 1.18 0.70 - 1.30 mg/dL    Sodium 138 136 - 145 mmol/L    Potassium 3.4 (L) 3.5 - 5.0 mmol/L    Chloride 100 98 - 107 mmol/L    CO2 27 22 - 31 mmol/L    Anion Gap, Calculation 11 5 - 18 mmol/L    GFR MDRD Af Amer >60 >60 mL/min/1.73m2    GFR MDRD Non Af Amer >60 >60 mL/min/1.73m2   Procalcitonin   Result Value Ref Range    Procalcitonin 1.54 (H) 0.00 - 0.49 ng/mL   HM1 (CBC with Diff)   Result Value Ref Range    WBC 8.3 4.0 - 11.0 thou/uL    RBC 2.49 (L) 4.40 - 6.20 mill/uL    Hemoglobin 7.7 (L) 14.0 - 18.0 g/dL    Hematocrit 24.4 (L) 40.0 - 54.0 %    MCV 98 80 - 100 fL    MCH 30.9 27.0 - 34.0 pg    MCHC 31.6 (L) 32.0 - 36.0 g/dL    RDW 16.4 (H) 11.0 - 14.5 %    Platelets 148 140 - 440 thou/uL    MPV 10.5 8.5 - 12.5 fL    Neutrophils % 92 (H) 50 - 70 %    Lymphocytes % 5 (L) 20 - 40 %    Monocytes % 3 2 - 10 %    Eosinophils % 0 0 - 6 %    Basophils % 0 0 - 2 %    Neutrophils Absolute 7.6 2.0 - 7.7 thou/uL    Lymphocytes Absolute 0.4 (L) 0.8 - 4.4 thou/uL    Monocytes Absolute 0.2 0.0 - 0.9 thou/uL    Eosinophils Absolute 0.0 0.0 - 0.4 thou/uL    Basophils Absolute 0.0 0.0 - 0.2 thou/uL   HM1 (CBC with Diff)   Result Value Ref Range    WBC 10.2 4.0 - 11.0 thou/uL    RBC 2.70 (L) 4.40 - 6.20 mill/uL    Hemoglobin 8.3 (L) 14.0 - 18.0 g/dL    Hematocrit 26.5 (L) 40.0 - 54.0 %    MCV 98 80 - 100 fL    MCH 30.7 27.0 - 34.0 pg    MCHC 31.3 (L) 32.0 - 36.0 g/dL    RDW 16.7 (H) 11.0 - 14.5 %    Platelets 177 140 - 440 thou/uL    MPV 10.6 8.5 - 12.5 fL    Neutrophils % 93 (H) 50 - 70 %    Lymphocytes % 5 (L) 20 - 40 %    Monocytes % 2 2 - 10 %    Eosinophils % 0 0 - 6 %    Basophils % 0 0 - 2 %    Neutrophils Absolute 9.4 (H) 2.0 - 7.7 thou/uL    Lymphocytes Absolute 0.5 (L) 0.8 - 4.4 thou/uL    Monocytes Absolute 0.2 0.0 - 0.9 thou/uL    Eosinophils Absolute 0.0 0.0 - 0.4 thou/uL    Basophils Absolute 0.0 0.0 - 0.2 thou/uL   Potassium   Result  Value Ref Range    Potassium 3.1 (L) 3.5 - 5.0 mmol/L   Magnesium   Result Value Ref Range    Magnesium 2.6 1.8 - 2.6 mg/dL   Renal Function Profile   Result Value Ref Range    Albumin 2.3 (L) 3.5 - 5.0 g/dL    Calcium 7.3 (L) 8.5 - 10.5 mg/dL    Phosphorus 2.0 (L) 2.5 - 4.5 mg/dL    Glucose 173 (H) 70 - 125 mg/dL    BUN 29 (H) 8 - 28 mg/dL    Creatinine 0.97 0.70 - 1.30 mg/dL    Sodium 137 136 - 145 mmol/L    Potassium 3.1 (L) 3.5 - 5.0 mmol/L    Chloride 96 (L) 98 - 107 mmol/L    CO2 30 22 - 31 mmol/L    Anion Gap, Calculation 11 5 - 18 mmol/L    GFR MDRD Af Amer >60 >60 mL/min/1.73m2    GFR MDRD Non Af Amer >60 >60 mL/min/1.73m2   HM1 (CBC with Diff)   Result Value Ref Range    WBC 9.1 4.0 - 11.0 thou/uL    RBC 2.44 (L) 4.40 - 6.20 mill/uL    Hemoglobin 7.5 (L) 14.0 - 18.0 g/dL    Hematocrit 23.4 (L) 40.0 - 54.0 %    MCV 96 80 - 100 fL    MCH 30.7 27.0 - 34.0 pg    MCHC 32.1 32.0 - 36.0 g/dL    RDW 16.5 (H) 11.0 - 14.5 %    Platelets 172 140 - 440 thou/uL    MPV 10.5 8.5 - 12.5 fL    Neutrophils % 90 (H) 50 - 70 %    Lymphocytes % 6 (L) 20 - 40 %    Monocytes % 4 2 - 10 %    Eosinophils % 0 0 - 6 %    Basophils % 0 0 - 2 %    Neutrophils Absolute 8.0 (H) 2.0 - 7.7 thou/uL    Lymphocytes Absolute 0.5 (L) 0.8 - 4.4 thou/uL    Monocytes Absolute 0.4 0.0 - 0.9 thou/uL    Eosinophils Absolute 0.0 0.0 - 0.4 thou/uL    Basophils Absolute 0.0 0.0 - 0.2 thou/uL   Type and Screen   Result Value Ref Range    ABORh A POS     Antibody Screen Negative Negative   Potassium   Result Value Ref Range    Potassium 3.9 3.5 - 5.0 mmol/L   Legionella Antigen, Urine   Result Value Ref Range    L pneumo Urine Agn Results Below    Sputum culture   Result Value Ref Range    Culture Usual Kristy     Gram Stain Result 2+ Polymorphonuclear leukocytes     Gram Stain Result 1+ Yeast     Gram Stain Result 1+ Gram positive cocci in pairs    Crossmatch   Result Value Ref Range    Crossmatch Compatible     Blood Expiration Date [id number]      Unit Type A Pos     Unit Number [id number]     Status Transfused     Component Red Blood Cells     PRODUCT CODE [id number]     Issue Date and Time [id number]     Blood Type 6200     CODING SYSTEM [id number]    Magnesium   Result Value Ref Range    Magnesium 1.7 (L) 1.8 - 2.6 mg/dL   Renal Function Profile   Result Value Ref Range    Albumin 2.4 (L) 3.5 - 5.0 g/dL    Calcium 6.6 (L) 8.5 - 10.5 mg/dL    Phosphorus 2.7 2.5 - 4.5 mg/dL    Glucose 143 (H) 70 - 125 mg/dL    BUN 31 (H) 8 - 28 mg/dL    Creatinine 0.85 0.70 - 1.30 mg/dL    Sodium 141 136 - 145 mmol/L    Potassium 3.7 3.5 - 5.0 mmol/L    Chloride 97 (L) 98 - 107 mmol/L    CO2 31 22 - 31 mmol/L    Anion Gap, Calculation 13 5 - 18 mmol/L    GFR MDRD Af Amer >60 >60 mL/min/1.73m2    GFR MDRD Non Af Amer >60 >60 mL/min/1.73m2   Comprehensive Metabolic Panel   Result Value Ref Range    Sodium 141 136 - 145 mmol/L    Potassium 3.7 3.5 - 5.0 mmol/L    Chloride 97 (L) 98 - 107 mmol/L    CO2 31 22 - 31 mmol/L    Anion Gap, Calculation 13 5 - 18 mmol/L    Glucose 143 (H) 70 - 125 mg/dL    BUN 31 (H) 8 - 28 mg/dL    Creatinine 0.85 0.70 - 1.30 mg/dL    GFR MDRD Af Amer >60 >60 mL/min/1.73m2    GFR MDRD Non Af Amer >60 >60 mL/min/1.73m2    Bilirubin, Total 0.5 0.0 - 1.0 mg/dL    Calcium 6.6 (L) 8.5 - 10.5 mg/dL    Protein, Total 5.1 (L) 6.0 - 8.0 g/dL    Albumin 2.4 (L) 3.5 - 5.0 g/dL    Alkaline Phosphatase 143 (H) 45 - 120 U/L    AST 41 (H) 0 - 40 U/L    ALT 36 0 - 45 U/L   HM1 (CBC with Diff)   Result Value Ref Range    WBC 9.9 4.0 - 11.0 thou/uL    RBC 2.75 (L) 4.40 - 6.20 mill/uL    Hemoglobin 8.4 (L) 14.0 - 18.0 g/dL    Hematocrit 26.6 (L) 40.0 - 54.0 %    MCV 97 80 - 100 fL    MCH 30.5 27.0 - 34.0 pg    MCHC 31.6 (L) 32.0 - 36.0 g/dL    RDW 16.4 (H) 11.0 - 14.5 %    Platelets 156 140 - 440 thou/uL    MPV 10.2 8.5 - 12.5 fL    Neutrophils % 91 (H) 50 - 70 %    Lymphocytes % 5 (L) 20 - 40 %    Monocytes % 4 2 - 10 %    Eosinophils % 0 0 - 6 %    Basophils % 0 0 -  2 %    Neutrophils Absolute 8.7 (H) 2.0 - 7.7 thou/uL    Lymphocytes Absolute 0.5 (L) 0.8 - 4.4 thou/uL    Monocytes Absolute 0.4 0.0 - 0.9 thou/uL    Eosinophils Absolute 0.0 0.0 - 0.4 thou/uL    Basophils Absolute 0.0 0.0 - 0.2 thou/uL   Calcium, Ionized, Measured   Result Value Ref Range    Calcium, Ionized Measured 0.84 (L) 1.11 - 1.30 mmol/L    Calcium, Ionized pH 7.4 0.85 (L) 1.11 - 1.30 mmol/L    pH 7.42 7.35 - 7.45   Phosphorus Level > 2.4 no replacement required   Result Value Ref Range    Phosphorus 2.5 2.5 - 4.5 mg/dL   Basic Metabolic Panel   Result Value Ref Range    Sodium 139 136 - 145 mmol/L    Potassium 3.5 3.5 - 5.0 mmol/L    Chloride 97 (L) 98 - 107 mmol/L    CO2 32 (H) 22 - 31 mmol/L    Anion Gap, Calculation 10 5 - 18 mmol/L    Glucose 147 (H) 70 - 125 mg/dL    Calcium 6.8 (L) 8.5 - 10.5 mg/dL    BUN 30 (H) 8 - 28 mg/dL    Creatinine 0.82 0.70 - 1.30 mg/dL    GFR MDRD Af Amer >60 >60 mL/min/1.73m2    GFR MDRD Non Af Amer >60 >60 mL/min/1.73m2   Hemoglobin   Result Value Ref Range    Hemoglobin 9.2 (L) 14.0 - 18.0 g/dL   Platelet Count - every other day x 3   Result Value Ref Range    Platelets 154 140 - 440 thou/uL   Potassium   Result Value Ref Range    Potassium 3.9 3.5 - 5.0 mmol/L   Magnesium   Result Value Ref Range    Magnesium 1.3 (L) 1.8 - 2.6 mg/dL   Phosphorus Level > 2.4 no replacement required   Result Value Ref Range    Phosphorus 1.8 (L) 2.5 - 4.5 mg/dL   Basic Metabolic Panel   Result Value Ref Range    Sodium 140 136 - 145 mmol/L    Potassium 3.9 3.5 - 5.0 mmol/L    Chloride 102 98 - 107 mmol/L    CO2 30 22 - 31 mmol/L    Anion Gap, Calculation 8 5 - 18 mmol/L    Glucose 97 70 - 125 mg/dL    Calcium 7.4 (L) 8.5 - 10.5 mg/dL    BUN 25 8 - 28 mg/dL    Creatinine 0.72 0.70 - 1.30 mg/dL    GFR MDRD Af Amer >60 >60 mL/min/1.73m2    GFR MDRD Non Af Amer >60 >60 mL/min/1.73m2   Basic Metabolic Panel   Result Value Ref Range    Sodium 144 136 - 145 mmol/L    Potassium 3.8 3.5 - 5.0  mmol/L    Chloride 108 (H) 98 - 107 mmol/L    CO2 26 22 - 31 mmol/L    Anion Gap, Calculation 10 5 - 18 mmol/L    Glucose 105 70 - 125 mg/dL    Calcium 7.4 (L) 8.5 - 10.5 mg/dL    BUN 18 8 - 28 mg/dL    Creatinine 0.76 0.70 - 1.30 mg/dL    GFR MDRD Af Amer >60 >60 mL/min/1.73m2    GFR MDRD Non Af Amer >60 >60 mL/min/1.73m2   Hemoglobin   Result Value Ref Range    Hemoglobin 9.8 (L) 14.0 - 18.0 g/dL   Magnesium   Result Value Ref Range    Magnesium 1.2 (L) 1.8 - 2.6 mg/dL         Assessment/Plan:  1. Lung cancer metastatic to bone (H)     2. Anxiety disorder     3. Pain management     4. Acute respiratory failure (H)     5. Cryptogenic organizing pneumonia (H)     6. Chronic anemia     7. Diastolic congestive heart failure (H)       Patient with acute respiratory failure secondary to COPD exacerbation, COPD, lung cancer with metastases and diastolic congestive heart failure.  Due to cryptogenic nature with severe obstructive lung disease: Patient continues on large dose steroids for 6 weeks.  Is a follow-up with pulmonology in 8 weeks.  Today his biggest complaint is pain management.  Continues with pain in his lower back and his right thigh.  It is constant.  Patient denies any radiculopathy.  I recently increased his extended release morphine to 20 mg twice daily.  He is maximizing his oxycodone every 6 hours for breakthrough pain.  He is crying.  I will increase to 30 mg twice daily.  Will continue with oxycodone for breakthrough pain.  He does have anxiety.  I recently increase his mirtazapine.  He continues on Paxil.  He has Klonopin at bedtime.  He also has lidocaine patches for his right hip and thigh as well as his lower back.  He continues on oxygen at 2 L.  He does have shortness of breath without the oxygen.        Electronically signed by: Angélica Khanna, CNP

## 2021-06-19 NOTE — PROGRESS NOTES
[facility] Hematology and Oncology Progress Note    Patient: Christian Rees  MRN: [id number]  Date of Service:         Reason for Visit    Chief Complaint   Patient presents with     HE Cancer     Lung Cancer       Assessment and Plan  Malignant neoplasm metastatic to lung, unspecified laterality (H)    Staging form: Lung, AJCC 7th Edition    - Clinical: Stage IV (T1b, N3, M1b) - Signed by Erica Borrero CNP on 3/14/2016    1. Lung cancer, stage IV: pt with recent progression. Was on observation. He is overexpressed on PDL1 so we have started single agent Keytruda.  Has received 2 cycles but unfortunately he has been in the hospital after both cycles as well as other times.  At this time I do not think we can continue this treatment he obviously is not tolerating it and his performance status is fairly poor.  Patient is hoping to improve that.  He would like to try other treatment.  We will plan on starting him back on single agent Alimta.  He took that for a long time in 2016 and tolerated it pretty well.  It was still working at that time as well.  Given a vitamin B12 injection today and start oral folic acid.  She will need a port as well.  I am hoping that they can get that placed under sedation while they are doing the MRI and do both of them on the same day.    2. Cancer related pain, pt with bone mets: Patient does have considerable pain in his right hip for unknown reasons.  I do not think that he has cancer there but I would like to get an MRI to investigate further why he is having such severe pain.  That will tell us if there is cancer there.  He does have cancer and other bones.  Patient states that he is extremely claustrophobic and will not be able to do it unless he has conscious sedation so we will order it that way.  I do not feel that he has any reason that he cannot undergo conscious sedation.    3. Anemia: likely from chronic disease and cancer.  We will give vitamin B12 injection  "today.  I will do further workup with folate, B12 level, ferritin, iron studies, reticulocyte, haptoglobin to look for any other reasons for his anemia.    4. Elevated alk phos: likely from bone mets. Overall stable. Will monitor and adjust chemo if necessary. Other LFT's are normal.     ECOG Performance   ECOG Performance Status: 2     Distress Assessment  Distress Assessment Score: 3 (\"its getting better\")    Pain  Currently in Pain: Yes  Pain Score (Initial OR Reassessment): 4  Location: right thigh: see above.         Problem List    1. Stage 4 lung cancer, unspecified laterality (H)  Injection Appointment    CC OFFICE VISIT LONG    Infusion Appointment    IR Port Placement 5+ Years    DISCONTINUED: dexamethasone (DECADRON) 4 MG tablet   2. Chronic diastolic congestive heart failure (H)  BNP(B-type Natriuretic Peptide)    BNP(B-type Natriuretic Peptide)    Iron and Transferrin Iron Binding Capacity    Ferritin    Reticulocytes    Folate, Serum    Vitamin B12    Haptoglobin    Lactate Dehydrogenase (LDH)    MR Pelvis With Without Contrast    MR Lumbar Spine With Without Contrast   3. Encounter for antineoplastic chemotherapy  Injection Appointment    [facility] OFFICE VISIT LONG    Infusion Appointment    DISCONTINUED: dexamethasone (DECADRON) 4 MG tablet   4. Hypomagnesemia  Injection Appointment    CC OFFICE VISIT LONG    Infusion Appointment    DISCONTINUED: dexamethasone (DECADRON) 4 MG tablet   5. Malignant neoplasm metastatic to lung, unspecified laterality (H)  Injection Appointment    CC OFFICE VISIT LONG    Infusion Appointment    DISCONTINUED: dexamethasone (DECADRON) 4 MG tablet   6. Hip pain, right        ______________________________________________________________________________    History of Present Illness    Measurable Disease: Ct scan, PET scan    Treatment: started Keytruda.  2 cycles.  He was in the hospital after both cycles.  Zometa    Past Treatment:   Alimta maintenance, 8 cycles completed in " December 2016  Patient has completed 5 cycles of carboplatin and Alimta with dose reduction for recurrent and metastatic lung cancer;  last cycle in June 2016  Left lower lobectomy in April 2013. He then received 4 cycles of chemotherapy as part of the ECOG 1505 clinical trial. He also received maintenance Avastin through December 2013 on the same clinical trial, this was stopped because of hip surgery     Interim History:  Pt is here today for hospital follow-up.  He is now been in the hospital multiple times in the last couple of months for many different reasons.  He is currently living at a TCU and hoping to get home next week.  Feels tired but overall is working with physical therapy and thinks it that is getting better.  He is definitely complaining of a lot of right hip pain he states that it feels deep and it happens when he is getting up trying to walk.  He states it is quite severe and the medications do not really seem to help a whole lot.  States that he wants to continue on treatment and is not ready to give up on everything.  He is frustrated with feeling poorly and very frustrated with his pain control.      Pain Status  Currently in Pain: Yes    Review of Systems    Constitutional  Constitutional (WDL): Exceptions to WDL  Fatigue: Fatigue relieved by rest  Neurosensory  Neurosensory (WDL): Exceptions to WDL  Ataxia: Moderate symptoms, limiting instrumental ADL  Eye   Eye Disorder (WDL): All eye disorder elements are within defined limits  Ear  Ear Disorder (WDL): All ear disorder elements are within defined limits  Cardiovascular  Cardiovascular (WDL): All cardiovascular elements are within defined limits  Pulmonary  Respiratory (WDL): Exceptions to WDL  Cough: Mild symptoms, nonprescription intervention indicated (dry, tickle)  Dyspnea: Shortness of breath with minimal exertion, limiting instrumental ADL  Hypoxia: Decreased oxygen saturation with exercise (e.g., pulse oximeter <88%), intermittent  "supplemental oxygen  Gastrointestinal  Gastrointestinal (WDL): Exceptions to WDL  Dysphagia: Symptomatic, able to eat regular diet (occ.)  Genitourinary  Genitourinary (WDL): All genitourinary elements are within defined limits  Lymphatic  Lymph (WDL): All lymph disorder elements are within defined limits  Musculoskeletal and Connective Tissue  Musculoskeletal and Connetive Tissue Disorders (WDL): Exceptions to WDL  Bone Pain: Moderate pain, limiting instrumental ADL  Muscle Weakness : Symptomatic, evident on physical exam, limiting instrumental ADL  Integumentary  Integumentary (WDL): All integumentary elements are within defined limits  Patient Coping  Patient Coping: Open/discussion  Distress Assessment  Distress Assessment Score: 3 (\"its getting better\")  Accompanied by  Accompanied by: Family Member  Oral Chemo Adherence       Past History  Past Medical History:   Diagnosis Date     Anxiety      Chronic back pain      Closed Posterior Dislocation Of The Hip     Created by Conversion      COPD (chronic obstructive pulmonary disease) (H) 03/20/2018     Depression      DVT (deep venous thrombosis) (H)      Gout      Heart failure (H)      Hypertension      Lung cancer (H)     Left lung     Persistent Atrial Fibrillation     Created by Conversion [facility] Annotation: Apr 17 2014 11:48AM - Ismael Holbrooket: 3/14 new afib  with contr VR with hip replacement [id number] score of  3 with 1  point each for age 65 and older, HTN and CHF hx---new warfarin start        PHYSICAL EXAM:  /77  Pulse 62  Temp 97.6  F (36.4  C) (Oral)   Wt 180 lb 12.8 oz (82 kg)  SpO2 97%  BMI 28.74 kg/m2  GENERAL: no acute distress. Cooperative in conversation. Here with wife. In wheelchair  HEENT: pupils are equal, round and reactive. Oromucosa is clean and intact. No ulcerations or mucositis noted. No bleeding noted.  RESP: lungs are clear bilaterally per auscultation. Regular respiratory rate. No wheezes or " rhonchi.  CV: Regular, rate and rhythm. No murmurs.  ABD: soft, nontender. Positive bowel sounds. No organomegaly.   MUSCULOSKELETAL: bilateral ankle swelling. Left slightly greater than right, which is typical for him  NEURO: non focal. Alert and oriented x3.   PSYCH: within normal limits. No depression or anxiety.  SKIN: warm dry intact   LYMPH: no cervical, supraclavicular lymphadenopathy      Lab Results  No results found for this or any previous visit (from the past 24 hour(s)).      Imaging    Xr Chest 1 View Portable    Result Date: 7/16/2018  XR CHEST 1 VIEW PORTABLE 7/16/2018 11:04 AM INDICATION: Sob COMPARISON: CT of the chest 06/30/2018. FINDINGS: Persistent patchy infiltrate of the mid to lower right lung. Small left pleural effusion. Normal heart size and pulmonary vascularity. Multiple bilateral metastatic pulmonary nodules and rib lesions again demonstrated. Pathologic fractures of right-sided ribs. High riding position of both humeral heads consistent with full-thickness rotator cuff tears.    Xr Hip Right 2 Or More Vws    Result Date: 7/20/2018  XR HIP RIGHT 2 OR MORE VWS 7/20/2018 5:30 PM INDICATION: Right hip pain COMPARISON: 7/4/2018 left hip FINDINGS: Postop change bilateral total hip arthroplasties. Pelvis and right hip otherwise negative. Right hip arthroplasty appears well seated and well aligned. No dislocations. No fractures.    Ct Chest Without Contrast    Result Date: 7/18/2018  CT CHEST WO CONTRAST 7/18/2018 4:16 PM INDICATION: Dyspnea chronic, had xray sob, right lung infiltrates TECHNIQUE: Routine chest. Dose reduction techniques were used. IV CONTRAST: None COMPARISON: Chest x-ray 07/16/2018, CT chest exams 06/30/2018, 07/11/2017 and 02/09/2016 FINDINGS: LUNGS AND PLEURA: The central airways are clear. Increased small bilateral pleural effusions with adjacent compressive atelectasis. New small amount of consolidation within the dependent portion of the right upper lobe with adjacent  airspace opacities. Multiple solid bilateral pulmonary nodules without significant interval change in size or number since the comparison CT chest exam. A dominant right upper lobe subpleural nodule image 57 series 3 measures a stable 1.0 cm. Another dominant right upper lobe subpleural nodule measures stable 9 mm image 32. MEDIASTINUM: Mild chest wall edema. Stable anterior pericardial lymph node measuring 7 mm in short axis dimension. Extensive coronary artery calcifications. Mild dilatation of the central pulmonary arteries suggesting pulmonary artery hypertension. LIMITED UPPER ABDOMEN: Cholelithiasis. MUSCULOSKELETAL: Numerous lytic and sclerotic metastases within the thoracic spine are without significant interval change. Stable presumed pathologic compression deformity involving the T8 vertebral body. Multiple bilateral presumably pathologic healing  rib fractures are stable.     CONCLUSION: 1.  New small amount of right upper lobar dependent consolidation with adjacent airspace opacities likely reflecting pneumonic infiltrates. 2.  Increased small bilateral pleural effusions with adjacent compressive atelectasis. 3.  Stable pulmonary and skeletal metastases. 4.  Stable bilateral healing rib fractures as well as a stable mild likely pathologic compression deformity of the T8 vertebral body.    Us Venous Legs Bilateral    Result Date: 7/16/2018  [facility] VENOUS LEGS BILATERAL 7/16/2018 5:07 PM INDICATION: Pain and swelling. TECHNIQUE: Routine with compression, augmentation, and duplex. Gray-scale and duplex including color and spectral imaging. COMPARISON: 06/04/2018 FINDINGS: The  right and left common femoral, greater saphenous, femoral vein in the thigh, popliteal, and segmentally visualized calf veins are fully compressible and demonstrate normal flow which augments with compression. Notable increase in the amount of subcutaneous edema about the ankles and calf.     1.  No evidence of DVT in   either leg. 2.  Notable increase in the amount of subcutaneous edema below the knees bilaterally.        Signed by: Erica Borrero, CNP

## 2021-06-19 NOTE — PROGRESS NOTES
[facility] Patient Outreach:     Discharge Date::  8/10/18      Reason for TCU stay (discharge diagnosis)::  Metastatic lung cancer, pain       Are you feeling better, the same or worse since your discharge?:  Patient is feeling better          As part of your discharge plan, did they discuss home care with you?: Yes        Have your seen them yet, or are they scheduled to visit?: Yes                Do you have any follow up visits scheduled with your PCP or Specialist?:  No          I'm glad to hear you're doing well and we want you to continue to do well. Your PCP would like to see you for a follow-up visit. Can we help set that up for your today?: Yes

## 2021-06-19 NOTE — PROGRESS NOTES
.jl  Code Status:  Full Code  Visit Type: Problem Visit     Facility:  [facility] [[id number]]         Facility Type: SNF (Skilled Nursing Facility, TCU)    History of Present Illness: Christian Rees is a 68 y.o. male who I am seeing today for follow-up on the TCU.  Patient with a history of stage IV adenocarcinoma, COPD, atrial fib recently hospitalized with increasing shortness of breath, cough or leg swelling.  Patient admitted with acute hypoxic respiratory failure.  It was felt that he had a COPD exacerbation of systolic heart failure in the setting of toxic lung cancer as well as pneumonia. Chest x-ray on admission showed a persistent patchy infiltrate of the mid to lower right lung with small left pleural effusion, multiple bilateral metastatic pulmonary nodules and right-sided pathologic rib fractures.  Of concern was repeat CT Chest 7/18 as outlined below and concern for cryptogenic organizing pneumonia for which pulmonology was consulted.  Pro-calcitonin elevated to 1.4 which was previously normal.  Sputum culture showed usual tanisha.  However pulmonary recommended to treat with prednisone 40 mg daily ×6 weeks then 30 mg daily ×8 weeks.  He was also treated with Bactrim as well as Augmentin.  Wn off oxygen as able to goal SPO2 88%. He continues on nebulizer. Acute/diastolic heart failure: improved.  on admission compared to 497 on 6/30/18.  Symptoms of orthopnea, dyspnea and leg swelling consistent with mild exacerbation.  Improved with IV Lasix. Discharged on oral Lasix 40mg. Paroxysmal atrial fibrillation, frequent atrial ectopy: On aspirin. Unfamiliar with the details regarding any discussion of stronger anticoagulation such as warfarin or equivalent long-term as reduction in the risk of thromboembolic event which in this patient is elevated. Pt continues carvedilol and aspirin. Anemia. He did undergo transfusion 1U PRBC 7/18.  Stage IV metastatic lung cancer to lymph nodes  and bone: Status post second dose of Keytruda 1 week ago.  This is patient's third or fourth hospitalization in the last 6 weeks.  Palliative care was consulted and patient remains full code. Patient does wish to consider possible additional therapy with alternative agent if able. Malignant related bone pain: Thoracic spine, ribs.  Continue MS Contin 15 mg every 12, oxycodone 5 mg every 6 hours as needed for breakthrough pain.  Continue Tylenol and lidocaine patch. Bilateral lower extremity edema: Venous duplex ultrasound negative for DVT.  Improved with IV diuretics.     Anxiety disorder: Psychiatry consulted.  Paxil increased to 40 mg daily.  Mirtazapine 7.5 mg at bedtime initiated at bedtime. Scheduled clonazepam once a day continued per his chronic regimen. Patient to follow-up with oncology psychologist after discharge with [facility], Karen Dang. 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 No evidence for pathologic fracture in this region.  Venous duplex ultrasound on 7/16 for DVT.    Repeat x-ray right hip on 7/20/18 showed postop bilateral total hip arthroplasties with pelvis and right hip otherwise negative without fractures or dislocations. Oral candidiasis treated with nystatin swish and swallow.   Hypokalemia, hypomagnesemia, hypocalcemia: replaced.  Optimize nutritional status. On potassium supplementation at discharge as well as magnesium supplementation. Moderate protein calorie malnutrition: Continue with Magic cup twice daily with meals.  Encourage nutrition at TCU.  Recommend dietitian follow a TCU.    Today patient lying in bed.  His nephew is present on visit.  He is more short of breath on today's visit.  Dyspnea with talking.  Continues on O2 at 2 L nasal cannula.  Wheezing present.  Continues with chronic pain in his low back and right  thigh.  Continues on morphine and oxycodone.  Reports anxiety somewhat better with increase in his clonazepam.  He does have bruising all over.  Increased bruising on the left torso around the rib region.  Patient continues on aspirin.  Recent platelets 158.     Active Ambulatory Problems     Diagnosis Date Noted     Generalized Anxiety Disorder      Right Bundle Branch Block With Left Anterior Fascicular Block      SOB (shortness of breath)      Familial (Benign Essential) Tremor      Mixed hyperlipidemia      HTN (hypertension)      CHF (congestive heart failure) (H)      Leukocytosis, unspecified type      Dizziness      Hypokalemia      Pigmented Nevus      Joint Pain, Localized In The Shoulder      Normocytic anemia      Edema      Joint Pain, Localized In The Hip      Idiopathic chronic gout without tophus, unspecified site      Excessive Sweating      Lung cancer (H) 07/25/2014     B12 deficiency 10/13/2014     Malignant neoplasm metastatic to lung, unspecified laterality (H) 01/22/2015     Hypomagnesemia 06/27/2016     CAD (coronary artery disease) 02/10/2017     Venous hypertension of both lower extremities 02/15/2017     Postthrombotic syndrome 02/15/2017     Acquired lymphedema of leg 02/15/2017     Vitamin D deficiency 02/15/2017     Chronic diastolic heart failure (H) 02/23/2017     Functional diarrhea 10/02/2017     Controlled substance agreement signed 10/02/2017     Obstructive sleep apnea syndrome 03/14/2018     Chronic right-sided low back pain without sciatica 05/11/2018     Elevated liver enzymes 05/11/2018     Stage 4 malignant neoplasm of lung, unspecified laterality (H) 06/04/2018     Cancer associated pain      Generalized weakness      Encounter for palliative care      Mood disorder due to medical condition      Depression with anxiety      Anxiety in acute stress reaction      Sleeping difficulty      Acute exacerbation of CHF (congestive heart failure) (H) 06/25/2018     Acute kidney  injury (H)      Hyperkalemia 06/30/2018     Acute respiratory failure with hypoxia (H)      Acute encephalopathy      History of CHF (congestive heart failure)      Goals of care, counseling/discussion      Acute renal failure, unspecified acute renal failure type (H)      Pneumonia of right lower lobe due to infectious organism (H)      Respiratory distress 07/16/2018     Cryptogenic organizing pneumonia (H)      Adjustment disorder with anxious mood      Encounter for antineoplastic chemotherapy 07/31/2018     Macrocytic anemia 08/02/2018     Elevation of level of transaminase or lactic acid dehydrogenase (LDH)      Bruising, spontaneous      Resolved Ambulatory Problems     Diagnosis Date Noted     Diarrhea      Closed Posterior Dislocation Of The Hip      Arthropathy Of The Ankle / Foot      Essential Hypertriglyceridemia      Abdominal Pain      Acute Gout      Chest Pain      Persistent Atrial Fibrillation      Headache 12/23/2014     Snoring 12/23/2014     Anxiety 04/11/2018     Dyspnea 04/11/2018     Healthcare-associated pneumonia      Past Medical History:   Diagnosis Date     Anxiety      Chronic back pain      Closed Posterior Dislocation Of The Hip      COPD (chronic obstructive pulmonary disease) (H) 03/20/2018     Depression      DVT (deep venous thrombosis) (H)      Gout      Heart failure (H)      Hypertension      Lung cancer (H)      Persistent Atrial Fibrillation        Reviewed in the record.    Allergies   Allergen Reactions     Oxycontin [Oxycodone] Itching         Review of Systems   No fevers or chills. No headache, lightheadedness or dizziness. Chronic shortness of breath however he feels it has improved with the use of oxygen, no chest pains or palpitations. He is coughing up green phelgm. Appetite is poor. No nausea, vomiting, constipation or diarrhea. No dysuria, frequency, burning or pain with urination. Chronic pain with complaints of back pain, bottom pain and right thigh/hip pain. He  is requesting an increase in his pain meds. He also reports increased anxiety.  He is requesting more clonazepam.  He told me today that pool therapy is good.      Physical Exam   PHYSICAL EXAMINATION:  Vital signs: BP /63, heart rate 91, respirations 20, temperature 97.5, O2 sat 99% on 2 L, current weight 181.6 pounds.  General: Awake, Alert, oriented x3, appropriately, follows simple commands, conversant  HEENT:PERRLA, Pink conjunctiva, anicteric sclerae, moist oral mucosa  NECK: Supple, without any lymphadenopathy, or masses  CVS:  S1  S2, without murmur or gallop.   LUNG: Increased dyspnea even at rest with talking.  Expiratory wheezing noted.  Wet nonproductive cough.  He is coughing up thick green phlegm.   BACK: No kyphosis of the thoracic spine  ABDOMEN: Soft, nontender to palpation, with positive bowel sounds  EXTREMITIES: Good range of motion on both upper and lower extremities with generalized weakness, 1+ pedal edema, no calf tenderness  SKIN: Warm and dry.  Bruising throughout.  He has a large bruise over the left flank region.  Does feel as of a hematoma very present.  NEUROLOGIC: Intact, pulses palpable  PSYCHIATRIC: Cognition intact.  Anxious.            Labs:    Recent Results (from the past 240 hour(s))   Comprehensive Metabolic Panel   Result Value Ref Range    Sodium 140 136 - 145 mmol/L    Potassium 5.3 (H) 3.5 - 5.0 mmol/L    Chloride 103 98 - 107 mmol/L    CO2 27 22 - 31 mmol/L    Anion Gap, Calculation 10 5 - 18 mmol/L    Glucose 160 (H) 70 - 125 mg/dL    BUN 30 (H) 8 - 28 mg/dL    Creatinine 0.85 0.70 - 1.30 mg/dL    GFR MDRD Af Amer >60 >60 mL/min/1.73m2    GFR MDRD Non Af Amer >60 >60 mL/min/1.73m2    Bilirubin, Total 0.7 0.0 - 1.0 mg/dL    Calcium 8.5 8.5 - 10.5 mg/dL    Protein, Total 6.2 6.0 - 8.0 g/dL    Albumin 2.9 (L) 3.5 - 5.0 g/dL    Alkaline Phosphatase 211 (H) 45 - 120 U/L    AST 23 0 - 40 U/L    ALT 47 (H) 0 - 45 U/L   Magnesium   Result Value Ref Range    Magnesium 1.6  (L) 1.8 - 2.6 mg/dL   HM1 (CBC with Diff)   Result Value Ref Range    WBC 24.0 (H) 4.0 - 11.0 thou/uL    RBC 2.53 (L) 4.40 - 6.20 mill/uL    Hemoglobin 7.9 (L) 14.0 - 18.0 g/dL    Hematocrit 26.3 (L) 40.0 - 54.0 %     (H) 80 - 100 fL    MCH 31.2 27.0 - 34.0 pg    MCHC 30.0 (L) 32.0 - 36.0 g/dL    RDW 16.4 (H) 11.0 - 14.5 %    Platelets 213 140 - 440 thou/uL    MPV 10.4 8.5 - 12.5 fL    Neutrophils % 91 (H) 50 - 70 %    Lymphocytes % 4 (L) 20 - 40 %    Monocytes % 2 2 - 10 %    Eosinophils % 2 0 - 6 %    Basophils % 0 0 - 2 %    Neutrophils Absolute 21.1 (H) 2.0 - 7.7 thou/uL    Lymphocytes Absolute 1.1 0.8 - 4.4 thou/uL    Monocytes Absolute 0.6 0.0 - 0.9 thou/uL    Eosinophils Absolute 0.4 0.0 - 0.4 thou/uL    Basophils Absolute 0.1 0.0 - 0.2 thou/uL   BNP(B-type Natriuretic Peptide)   Result Value Ref Range     (H) 0 - 67 pg/mL   Iron and Transferrin Iron Binding Capacity   Result Value Ref Range    Iron 57 42 - 175 ug/dL    Transferrin 227 212 - 360 mg/dL    Transferrin Saturation, Calculated 20 20 - 50 %    Transferrin IBC, Calculated 284 (L) 313 - 563 ug/dL   Ferritin   Result Value Ref Range    Ferritin 1212 (H) 27 - 300 ng/mL   Reticulocytes   Result Value Ref Range    Retic Absolute Count 0.115 (H) 0.010 - 0.110 mill/uL   Folate, Serum   Result Value Ref Range    Folate 12.2 >=3.5 ng/mL   Vitamin B12   Result Value Ref Range    Vitamin B-12 536 213 - 816 pg/mL   Haptoglobin   Result Value Ref Range    Haptoglobin 323 (H) 33 - 171 mg/dL   Lactate Dehydrogenase (LDH)   Result Value Ref Range    LD (LDH) 234 (H) 125 - 220 U/L   BNP(B-type Natriuretic Peptide)   Result Value Ref Range     (H) 0 - 67 pg/mL   Hemoglobin   Result Value Ref Range    Hemoglobin 6.7 (LL) 14.0 - 18.0 g/dL   Platelet Count   Result Value Ref Range    Platelets 158 140 - 440 thou/uL   Basic Metabolic Panel   Result Value Ref Range    Sodium 139 136 - 145 mmol/L    Potassium 4.8 3.5 - 5.0 mmol/L    Chloride 102 98 -  107 mmol/L    CO2 23 22 - 31 mmol/L    Anion Gap, Calculation 14 5 - 18 mmol/L    Glucose 124 70 - 125 mg/dL    Calcium 8.3 (L) 8.5 - 10.5 mg/dL    BUN 27 8 - 28 mg/dL    Creatinine 0.83 0.70 - 1.30 mg/dL    GFR MDRD Af Amer >60 >60 mL/min/1.73m2    GFR MDRD Non Af Amer >60 >60 mL/min/1.73m2   Lactic Acid   Result Value Ref Range    Lactic Acid 3.2 (H) 0.5 - 2.2 mmol/L   Type and Screen   Result Value Ref Range    ABORh A POS     Antibody Screen Negative Negative   INR   Result Value Ref Range    INR 1.10 0.90 - 1.10   BNP(B-type Natriuretic Peptide)   Result Value Ref Range     (H) 0 - 67 pg/mL   Hepatic Profile   Result Value Ref Range    Bilirubin, Total 0.8 0.0 - 1.0 mg/dL    Bilirubin, Direct 0.3 <=0.5 mg/dL    Protein, Total 6.1 6.0 - 8.0 g/dL    Albumin 2.9 (L) 3.5 - 5.0 g/dL    Alkaline Phosphatase 219 (H) 45 - 120 U/L    AST 18 0 - 40 U/L    ALT 27 0 - 45 U/L   HM1 (CBC with Diff)   Result Value Ref Range    WBC 16.8 (H) 4.0 - 11.0 thou/uL    RBC 2.35 (L) 4.40 - 6.20 mill/uL    Hemoglobin 7.3 (L) 14.0 - 18.0 g/dL    Hematocrit 24.5 (L) 40.0 - 54.0 %     (H) 80 - 100 fL    MCH 31.1 27.0 - 34.0 pg    MCHC 29.8 (L) 32.0 - 36.0 g/dL    RDW 17.9 (H) 11.0 - 14.5 %    Platelets 196 140 - 440 thou/uL    MPV 10.6 8.5 - 12.5 fL    Neutrophils % 90 (H) 50 - 70 %    Lymphocytes % 6 (L) 20 - 40 %    Monocytes % 3 2 - 10 %    Eosinophils % 1 0 - 6 %    Basophils % 0 0 - 2 %    Neutrophils Absolute 14.7 (H) 2.0 - 7.7 thou/uL    Lymphocytes Absolute 1.0 0.8 - 4.4 thou/uL    Monocytes Absolute 0.6 0.0 - 0.9 thou/uL    Eosinophils Absolute 0.1 0.0 - 0.4 thou/uL    Basophils Absolute 0.0 0.0 - 0.2 thou/uL   Hemogram with PLT   Result Value Ref Range    WBC 9.7 4.0 - 11.0 thou/uL    RBC 2.08 (L) 4.40 - 6.20 mill/uL    Hemoglobin 6.5 (LL) 14.0 - 18.0 g/dL    Hematocrit 21.5 (L) 40.0 - 54.0 %     (H) 80 - 100 fL    MCH 31.3 27.0 - 34.0 pg    MCHC 30.2 (L) 32.0 - 36.0 g/dL    RDW 18.2 (H) 11.0 - 14.5 %     Platelets 133 (L) 140 - 440 thou/uL    MPV 9.9 8.5 - 12.5 fL   Direct Antiglobulin Test   Result Value Ref Range    LUCIO, IgG,-C3d NEG Negative   Lactic Acid   Result Value Ref Range    Lactic Acid 1.1 0.5 - 2.2 mmol/L   Occult Blood, Fecal   Result Value Ref Range    Occult Blood, Stool #1 Positive (!) Negative   Crossmatch   Result Value Ref Range    Crossmatch Compatible     Blood Expiration Date [id number]     Unit Type A Pos     Unit Number [id number]     Status Transfused     Component Red Blood Cells     PRODUCT CODE [id number]     Issue Date and Time [id number]     Blood Type 6200     CODING SYSTEM [id number]    Crossmatch   Result Value Ref Range    Crossmatch Compatible     Blood Expiration Date [id number]     Unit Type A Pos     Unit Number [id number]     Status Transfused     Component Red Blood Cells     PRODUCT CODE [id number]     Issue Date and Time [id number]     Blood Type 6200     CODING SYSTEM [id number]    Basic Metabolic Panel   Result Value Ref Range    Sodium 140 136 - 145 mmol/L    Potassium 4.4 3.5 - 5.0 mmol/L    Chloride 105 98 - 107 mmol/L    CO2 26 22 - 31 mmol/L    Anion Gap, Calculation 9 5 - 18 mmol/L    Glucose 101 70 - 125 mg/dL    Calcium 7.6 (L) 8.5 - 10.5 mg/dL    BUN 21 8 - 28 mg/dL    Creatinine 0.71 0.70 - 1.30 mg/dL    GFR MDRD Af Amer >60 >60 mL/min/1.73m2    GFR MDRD Non Af Amer >60 >60 mL/min/1.73m2   HM2(CBC w/o Differential)   Result Value Ref Range    WBC 10.1 4.0 - 11.0 thou/uL    RBC 2.69 (L) 4.40 - 6.20 mill/uL    Hemoglobin 8.3 (L) 14.0 - 18.0 g/dL    Hematocrit 26.5 (L) 40.0 - 54.0 %    MCV 99 80 - 100 fL    MCH 30.9 27.0 - 34.0 pg    MCHC 31.3 (L) 32.0 - 36.0 g/dL    RDW 19.3 (H) 11.0 - 14.5 %    Platelets 149 140 - 440 thou/uL    MPV 10.2 8.5 - 12.5 fL         Assessment/Plan:    1. Shortness of breath     2. Bruising     3. Lung cancer metastatic to bone (H)     4. Pain management     5. Acute respiratory failure (H)     6. Diastolic congestive heart  failure (H)     7. Cryptogenic organizing pneumonia (H)     8. Chronic anemia     9. Anxiety disorder       Patient with underlying lung CA with metastases to bone as well as congestive heart failure and cryptogenic organizing pneumonia.  Patient with increasing shortness of breath at rest or with talking.  He has expiratory wheezing and wet nonproductive cough.  He continues on O2 at 2 L nasal cannula.  Follow-up chest x-ray to rule out other involvement with pneumonia versus fluid overload.  He does continue on Lasix 40 mg.  Increased bruising over the extremities with a large area of bruising of the left flank region.  Appears to be possible hematoma.  He does have history of spontaneous fractures.  Will follow up with x-rays as well as Doppler over the flank region..  Chronic pain.  Recently increased his morphine to 30 twice daily.  Continues on oxycodone 5 mg every 6 hours in the 20th.  Anxiety treated with clonazepam twice daily which was recently increased as well as paxil.  Chronic anemia.  Will need to follow-up with laboratory including CBC, BMP and BNP.  Platelets obtained yesterday with CBC which were normal.  Will hold his aspirin.  Overall patient with deconditioning.        Electronically signed by: Angélica Khanna, CNP

## 2021-06-19 NOTE — PROGRESS NOTES
Assessment/Plan:       1. Chronic midline low back pain without sciatica/Cancer associated pain/Bone metastasis  Referral placed to spine care to see if any further intervention such as injection would be appropriate for his pain.  Overall his pain is poorly managed with current oral pain medications, however at higher doses patient has some side effects, primarily confusion and fatigue.  - Ambulatory referral to Spine Care  - Ambulatory referral to Care Management (Primary Care)    2. Stage 4 malignant neoplasm of lung, unspecified laterality (H)  Following closely with [facility] Oncology Dr. Li.  Making decisions as to continuation of chemotherapy, for now moving forward with this.  - Ambulatory referral to Care Management (Primary Care)    3. Cryptogenic organizing pneumonia (H)  Discussed with patient that once he runs out of prednisone, will decrease dose slightly until he follows up with Pulmonology.  Currently his breathing is stable.  - Ambulatory referral to Care Management (Primary Care)    4. Generalized anxiety disorder  Reasonably well controlled on Paxil and clonazepam.  Patient still has moments of exacerbated anxiety.  - Ambulatory referral to Care Management (Primary Care)    5. SOB (shortness of breath)  Also episodic, worse with activity.  Patient wears O2 at 2L per nasal cannula intermittently.  He always uses this with activity, but sometimes shuts this off during the day when at rest.  - Ambulatory referral to Care Management (Primary Care)        Subjective:       Christian Rees is a 68 y.o. male who presents for hospital follow-up.  He was residing in a TCU after his previous hospitalization when he was found to have a large bruise on his back and a hemoglobin of 6.5.  The bruise was of unknown etiology.  Patient stated he had just undergone a new therapy the day before, which was an aquatic therapy.  At that time, he was wearing a floating belt that possibly put some pressure  on the area of bruising.  There was no pain with this.  During his hospitalization, he was transfused 2 units of PRBCs and his Hgb remained stable.  He was seen by GI who doubted that the blood loss was GI-related, but offered endoscopy as outpatient if he seems to have continued blood loss.  Patient was discharged back to TCU where he completed PT and OT and was eventually discharged home with home therapies and RN.  Patient states that overall he feels weak, but is working back to becoming stronger.  His goals at this time are to get stronger with PT and OT, remain independent in his home with his wife, and continue chemotherapy for now.  He is wondering if he would be able to get a scooter for ambulation, but has not checked with his insurance about this.  He continues with chronic pain, primarily in his central lower back.  This does not currently radiate into his legs.  When he is at rest, he has 4-5/10 pain, worse with ambulation and other movements.    Imaging Reviewed:  MRI PELVIS WITHOUT AND WITH IV CONTRAST  8/8/2018 12:07 PM  INDICATION: Worsening pain in hip/thigh, has metastatic lung cancer worsening pain in hip/thigh, has metastatic lung cancer.  TECHNIQUE: Routine MRI pelvis without and with IV contrast. Axial, sagittal, and coronal high-resolution T2 and post gadolinium T1 with fat saturation.   IV CONTRAST: Gadavist 10 mL.  COMPARISON: 7/20/2018 plain films.  CONCLUSION:  1.  Innumerable metastatic lesions throughout the lower lumbar spine and pelvis.  2.  No evidence of a fracture.  3.  Bilateral total hip arthroplasties.      Labs Reviewed:  Lab Results   Component Value Date    WBC 20.4 (H) 08/14/2018    HGB 8.4 (L) 08/14/2018    HCT 27.6 (L) 08/14/2018     08/14/2018    ALT <9 08/14/2018    AST 11 08/14/2018     08/14/2018    K 4.5 08/14/2018     08/14/2018    CREATININE 0.83 08/14/2018    BUN 18 08/14/2018    CO2 29 08/14/2018    TSH 1.32 06/14/2018    INR 1.08 08/14/2018          The following portions of the patient's history were reviewed and updated as appropriate: allergies, current medications, past medical history, past social history, past surgical history and problem list.      Current Outpatient Prescriptions:      acetaminophen (TYLENOL) 500 MG tablet, Take 1,000 mg by mouth 3 (three) times a day as needed for pain or fever. , Disp: , Rfl:      albuterol (PROAIR HFA;PROVENTIL HFA;VENTOLIN HFA) 90 mcg/actuation inhaler, Inhale 2 puffs every 6 (six) hours as needed for wheezing. (Patient taking differently: Inhale 2 puffs every 6 (six) hours as needed for wheezing. ), Disp: 1 each, Rfl: 3     carvedilol (COREG) 6.25 MG tablet, Take 1 tablet (6.25 mg total) by mouth 2 (two) times a day with meals. (Patient taking differently: Take 6.25 mg by mouth 2 (two) times a day with meals. ), Disp: 60 tablet, Rfl: 0     clonazePAM (KLONOPIN) 0.5 MG tablet, Take 1 tablet (0.5 mg total) by mouth at bedtime., Disp: 3 tablet, Rfl: 0     cyanocobalamin 1,000 mcg/mL injection, Inject 1,000 mcg into the shoulder, thigh, or buttocks every 30 (thirty) days. , Disp: , Rfl:      fluticasone-vilanterol (BREO ELLIPTA) 200-25 mcg/dose DsDv inhaler, Inhale 1 puff daily. , Disp: , Rfl:      folic acid (FOLVITE) 1 MG tablet, Take 1 tablet (1 mg total) by mouth daily. (Patient taking differently: Take 1 mg by mouth daily. ), Disp: 90 tablet, Rfl: 3     furosemide (LASIX) 40 MG tablet, Take 1 tablet (40 mg total) by mouth daily., Disp: 90 tablet, Rfl: 1     ipratropium-albuterol (DUO-NEB) 0.5-2.5 mg/3 mL nebulizer, Take 3 mL by nebulization 4 (four) times a day., Disp: 120 vial, Rfl: 3     magnesium oxide (MAG-OX) 400 mg tablet, Take 400 mg by mouth at bedtime. , Disp: , Rfl:      mirtazapine (REMERON) 7.5 MG tablet, Take 1 tablet (7.5 mg total) by mouth at bedtime. (Patient taking differently: Take 7.5 mg by mouth at bedtime. ), Disp: 3 tablet, Rfl: 0     morphine (MS CONTIN) 15 MG 12 hr tablet, Take 2  "tablets (30 mg total) by mouth 2 (two) times a day., Disp: 120 tablet, Rfl: 0     omeprazole (PRILOSEC) 20 MG capsule, Take 20 mg by mouth daily before breakfast. , Disp: , Rfl:      oxyCODONE (ROXICODONE) 5 MG immediate release tablet, Take 3 tablets (15 mg total) by mouth every 6 (six) hours as needed., Disp: 30 tablet, Rfl: 0     PARoxetine (PAXIL) 40 MG tablet, Take 1 tablet (40 mg total) by mouth every morning., Disp: 90 tablet, Rfl: 1     potassium chloride (KLOR-CON) 10 MEQ CR tablet, Take 2 tablets (20 mEq total) by mouth 2 (two) times a day. With food.  OK to crush and mix with food, Disp: 120 tablet, Rfl: 2     predniSONE (DELTASONE) 20 MG tablet, Take 2 tablets (40 mg total) by mouth daily with breakfast for 21 days., Disp: 42 tablet, Rfl: 0     sulfamethoxazole-trimethoprim (SEPTRA) 400-80 mg per tablet, Take 1 tablet by mouth daily., Disp: 30 tablet, Rfl: 1     aspirin 81 MG EC tablet, Take 81 mg by mouth daily. , Disp: , Rfl:     Review of Systems   A 12 point comprehensive review of systems was negative except as noted.      Objective:      /68 (Patient Site: Right Arm, Patient Position: Sitting, Cuff Size: Adult Regular)  Pulse 87  Resp 24  Ht 5' 7\" (1.702 m)  Wt 179 lb (81.2 kg)  SpO2 97%  BMI 28.04 kg/m2    General appearance: alert, appears stated age and cooperative  Head: Normocephalic, without obvious abnormality, atraumatic  Eyes: conjunctivae clear, sclerae anicteric  Back: symmetric, mild tenderness to palpation of the lumbar vertebral bodies  Lungs: diminished breath sounds bilaterally consistent with previous; no rhonchi or wheeze auscultated  Heart: RRR, frequent ectopy  Extremities: minimal LE edema at present  Skin: bruising left flank and bilateral dorsal forearms and hands  Neurologic: Grossly normal, cranial nerves grossly intact, walking with a cane today  Psychiatric: speech is fluent and thought process is linear, affect is reactive and appropriate, mood is described " as neutral

## 2021-06-19 NOTE — PROGRESS NOTES
Pt arrived ambulatory to clinic for possible Alimta and Zometa infusion.  Port was accessed using aseptic technique without difficulties with excellent blood return.  Chemotherapy will be postponed for 1 more week.  Administered IV Zometa per MD order.  Pt tolerated infusion well, no s/s of infusion reaction.  Port was flushed with NS and Heparin then de-accessed using 2x2 and papertape.  Pt verbalized understanding of plan of care and return to clinic.

## 2021-06-19 NOTE — PROGRESS NOTES
Outpatient Palliative Care Consult      Christian Rees,  1950, MRN [id number]        PCP: Cristela De Jesus MD, [phone]   Code status:  Full code       Extended Emergency Contact Information  Primary Emergency Contact: Katarzyna Rees  Address: [address] [place] [place] [address] [facility]  Home Phone: [phone]  Relation: Spouse  Secondary Emergency Contact: Damien Rees   [facility]  Home Phone: [phone]  Relation: Child          Impression and Recommendations:      1.  Dyspnea, 2nd stage 4 lung cancer, hx COPD, diastolic heart failure, EKTA  Comment:  Now wearing oxygen 2L NC almost 24 hours per day and symptoms improved  Plan:  Continue with oxygen therapy 2L  Pace activities      2.  Weakness, multifactorial.related to cancer, anemia, frequent hospitalizations  Comment:  Recently discharged from 3 week TCU; stronger  Plan:  OT/PT as able  Home care RN/HHA  Information given for [employer] food delivery      3. Cancer related pain, well controlled on current regimen of MS Contin and Oxycodone PRN (denies pain today).  Comment:  Taking oxycodone about once per happy; pain much less; satisfied with current regimen  Plan:  Continue MS Contin 15 mg two times a day  Continue oxycodone every 4 hours prn  Continue Tylenol PRN      3. Palliative Care Encounter - Please see discussion below.  Comment:  Patient is full code and desires to continue treatment if possible  Plan:  Follow up with oncologist  Will follow up in our palliative clinic in 2-3 months depending on treatment plan  Further discussion regarding goals once repeat scan and decision regarding treatment options       Chief Complaint Cancer related pain       HPI    Christian Rees is a 68 y.o. year old male presents to the Outpatient Palliative Care Clinic today for ongoing symptom management and goals of care discussion.  ROMEO Jarvis, was present as part of the interdisciplinary  team.  Catracho was accompanied by his nephew Zay who has been a big support for him.    Referred by Shasha Billy, CNS, Palliative Care.    Summary:  Noel is a 68 year old male who presented to the ER on 6/4/2018 with back pain.  Past medical history includes metastatic adenocarcinoma of the lung status post left lower lobectomy in 4/2013, status post chemotherapy with last dose of Ultima in 12/2016, severe COPD, anxiety/depression, chronic diastolic heart failure, CAD, hypertension, EKTA.  He is followed by Dr. Li  He has had 3 hospitalizations since June.  Chemotherapy is on hold until he is stronger.  Last hospitalization was for anemia/Hgb 6.5/paraspinal hematoma.  He is now recently discharged from TCU to home.    MRI PELVIS WITHOUT AND WITH IV CONTRAST  8/8/2018 12:07 PM     INDICATION: Worsening pain in hip/thigh, has metastatic lung cancer worsening pain in hip/thigh, has metastatic lung cancer.  TECHNIQUE: Routine MRI pelvis without and with IV contrast. Axial, sagittal, and coronal high-resolution T2 and post gadolinium T1 with fat saturation.   IV CONTRAST: Gadavist 10 mL.  COMPARISON: 7/20/2018 plain films.     FINDINGS: There are innumerable skeletal metastatic lesions throughout the visualized portions of the lower lumbar spine and pelvis, see for example series 5 image 13, series 4 image 12. No definite evidence of metastatic lesions in either proximal   femur.     Right total hip arthroplasty. Left total hip arthroplasty with cerclage wire fixation proximal femoral shaft.     Mild global atrophy of the pelvic and proximal thigh musculature. No evidence of retroperitoneal or inguinal adenopathy. Pelvic intraperitoneal contents normal. Small seroma along the superficial margin of the right greater trochanter. Mild edema within   both psoas muscles.     IMPRESSION:   CONCLUSION:  1.  Innumerable metastatic lesions throughout the lower lumbar spine and pelvis.     2.  No evidence of a  fracture.     3.  Bilateral total hip arthroplasties.     Patient's understanding of current medical condition:  Per Dr Li's visit, 8/3/18:  Did discuss the status of his lung cancer frankly with the patient.  He understands that he has metastatic disease which is incurable.  He has had poor tolerance for immunotherapy.  At this point I would delay trying chemotherapy until he is fully recovered and stable.  Will delay plans for treatment next week.  I explained my concerns that if he is not tolerating any treatments then we may need to consider just palliative and symptom care.  Catracho will have a scan on Friday and then further decision will be made regarding treatment.  Catracho is hoping there is a treatment that will slow the cancer's progress.  He had good response after treatment of his original lung cancer about 2 years ago.    Recent changes in patient's condition:   [facility]  He was there for about 3 weeks after last hospitalization and is much stronger and able to manage at home with help of nursing, PT and OT.  Lives with wife but she has medical problems also.    Family's concerns:  Patient and nephew state being at home is going quite well.  Concerned about effort required to prepare meals.  Information given and well received regarding [employer] resource.  Patient thinks he is currently safe at home with PT, OT, HHA, nursing.    Patient's/family's goals of care:   Wants to continue with treatment.  Awaiting visit with Dr Li regarding restart chemotherapy.    Advanced Care Plans/Health Care Directive:   Patient's code status is currently full code.  He does have a HCD on file.    Documented Healthcare Agent:   Wife Katarzyna is healthcare agent.    Spiritual History:  Are you a spiritual person? Yes  Scientologist gabriel tradition    Patient demonstrates decision-making capacity; s/he does demonstrate understanding of relevant information about  condition, the available test and treatment options, use reason to make a decision about these, and communicate choice about these. (ARACELI 306, 4, p. 420-4).    Patient does meet criteria for Hospice Medicare benefit. Diagnosis metastatic lung cancer.  Patient still desires treatment.          Medical History  Active Ambulatory (Non-Hospital) Problems    Diagnosis     Elevation of level of transaminase or lactic acid dehydrogenase (LDH)     Bruising, spontaneous     Macrocytic anemia     Encounter for antineoplastic chemotherapy     Adjustment disorder with anxious mood     Cryptogenic organizing pneumonia (H)     Respiratory distress     Pneumonia of right lower lobe due to infectious organism (H)     Goals of care, counseling/discussion     Acute renal failure, unspecified acute renal failure type (H)     Hyperkalemia     Acute respiratory failure with hypoxia (H)     Acute encephalopathy     History of CHF (congestive heart failure)     Acute exacerbation of CHF (congestive heart failure) (H)     Acute kidney injury (H)     Mood disorder due to medical condition     Depression with anxiety     Anxiety in acute stress reaction     Sleeping difficulty     Cancer associated pain     Generalized weakness     Encounter for palliative care     Stage 4 malignant neoplasm of lung, unspecified laterality (H)     Chronic right-sided low back pain without sciatica     Elevated liver enzymes     Obstructive sleep apnea syndrome     Functional diarrhea     Controlled substance agreement signed     Chronic diastolic heart failure (H)     Venous hypertension of both lower extremities     Postthrombotic syndrome     Acquired lymphedema of leg     Vitamin D deficiency     CAD (coronary artery disease)     Hypomagnesemia     Malignant neoplasm metastatic to lung, unspecified laterality (H)     B12 deficiency     Lung cancer (H)     Idiopathic chronic gout without tophus, unspecified site     Excessive Sweating     Generalized  Anxiety Disorder     Right Bundle Branch Block With Left Anterior Fascicular Block     SOB (shortness of breath)     Familial (Benign Essential) Tremor     Mixed hyperlipidemia     HTN (hypertension)     CHF (congestive heart failure) (H)     Leukocytosis, unspecified type     Dizziness     Hypokalemia     Pigmented Nevus     Joint Pain, Localized In The Shoulder     Normocytic anemia     Edema     Joint Pain, Localized In The Hip     Past Medical History:   Diagnosis Date     Anxiety      Chronic back pain      Closed Posterior Dislocation Of The Hip      COPD (chronic obstructive pulmonary disease) (H) 03/20/2018     Depression      DVT (deep venous thrombosis) (H)      Gout      Heart failure (H)      Hypertension      Lung cancer (H)      Persistent Atrial Fibrillation     Surgical History  He  has a past surgical history that includes pr total hip arthroplasty (Bilateral, Left (2011), right (2012) ); pr removal of lung,lobectomy; pr cardioversion elective arrhythmia external; Shoulder surgery (Left, 2009?); and Lung surgery.   Social History  Reviewed, and he  reports that he quit smoking about 5 years ago. He has a 40.00 pack-year smoking history. He has never used smokeless tobacco. He reports that he does not drink alcohol or use illicit drugs.   Allergies  Allergies   Allergen Reactions     Oxycontin [Oxycodone] Itching    Family History  Reviewed, and family history includes Cancer in his mother; Hypertension in his father; No Medical Problems in his son. There is no history of Diabetes, Heart disease, Colon cancer, or Prostate cancer.   Psychosocial Needs  Social History     Social History Narrative    .  1 child.  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     Additional psychosocial needs reviewed per nursing assessment.     Medications & Allergies   Medications:     Current Outpatient Prescriptions:      acetaminophen (TYLENOL) 500 MG tablet, Take 1,000 mg by mouth 3 (three) times a day as  needed for pain or fever. , Disp: , Rfl:      albuterol (PROAIR HFA;PROVENTIL HFA;VENTOLIN HFA) 90 mcg/actuation inhaler, Inhale 2 puffs every 6 (six) hours as needed for wheezing. (Patient taking differently: Inhale 2 puffs every 6 (six) hours as needed for wheezing. ), Disp: 1 each, Rfl: 3     aspirin 81 MG EC tablet, Take 81 mg by mouth daily. , Disp: , Rfl:      carvedilol (COREG) 6.25 MG tablet, Take 1 tablet (6.25 mg total) by mouth 2 (two) times a day with meals. (Patient taking differently: Take 6.25 mg by mouth 2 (two) times a day with meals. ), Disp: 60 tablet, Rfl: 0     clonazePAM (KLONOPIN) 0.5 MG tablet, Take 1 tablet (0.5 mg total) by mouth at bedtime., Disp: 3 tablet, Rfl: 0     clonazePAM (KLONOPIN) 0.5 MG tablet, Take 1 tablet (0.5 mg total) by mouth daily as needed for anxiety., Disp: 30 tablet, Rfl: 0     cyanocobalamin 1,000 mcg/mL injection, Inject 1,000 mcg into the shoulder, thigh, or buttocks every 30 (thirty) days. , Disp: , Rfl:      fluticasone-vilanterol (BREO ELLIPTA) 200-25 mcg/dose DsDv inhaler, Inhale 1 puff daily. , Disp: , Rfl:      folic acid (FOLVITE) 1 MG tablet, Take 1 tablet (1 mg total) by mouth daily. (Patient taking differently: Take 1 mg by mouth daily. ), Disp: 90 tablet, Rfl: 3     furosemide (LASIX) 40 MG tablet, Take 1 tablet (40 mg total) by mouth daily., Disp: 30 tablet, Rfl: 0     ipratropium-albuterol (DUO-NEB) 0.5-2.5 mg/3 mL nebulizer, Take 3 mL by nebulization 4 (four) times a day., Disp: 120 vial, Rfl: 3     magnesium oxide (MAG-OX) 400 mg tablet, Take 400 mg by mouth at bedtime. , Disp: , Rfl:      mirtazapine (REMERON) 7.5 MG tablet, Take 1 tablet (7.5 mg total) by mouth at bedtime. (Patient taking differently: Take 7.5 mg by mouth at bedtime. ), Disp: 3 tablet, Rfl: 0     morphine (MS CONTIN) 15 MG 12 hr tablet, Take 2 tablets (30 mg total) by mouth 2 (two) times a day., Disp: 120 tablet, Rfl: 0     omeprazole (PRILOSEC) 20 MG capsule, Take 20 mg by mouth  daily before breakfast. , Disp: , Rfl:      oxyCODONE (ROXICODONE) 5 MG immediate release tablet, Take 3 tablets (15 mg total) by mouth every 6 (six) hours as needed., Disp: 30 tablet, Rfl: 0     PARoxetine (PAXIL) 40 MG tablet, Take 1 tablet (40 mg total) by mouth every morning. (Patient taking differently: Take 40 mg by mouth every morning. ), Disp: , Rfl:      potassium chloride (KLOR-CON) 10 MEQ CR tablet, Take 2 tablets (20 mEq total) by mouth 2 (two) times a day. With food.  OK to crush and mix with food (Patient taking differently: Take 20 mEq by mouth 2 (two) times a day. With food.  OK to crush and mix with food), Disp: , Rfl: 0     predniSONE (DELTASONE) 20 MG tablet, Take 2 tablets (40 mg total) by mouth daily with breakfast. (Patient taking differently: Take 40 mg by mouth daily with breakfast. ), Disp: , Rfl: 0     [START ON 9/5/2018] predniSONE (DELTASONE) 20 MG tablet, Take 1.5 tablets (30 mg total) by mouth daily. (Patient taking differently: Take 30 mg by mouth daily. ), Disp: , Rfl: 0     sulfamethoxazole-trimethoprim (SEPTRA) 400-80 mg per tablet, Take 1 tablet by mouth daily., Disp: 10 tablet, Rfl: 0    Allergies/intolerances:    Allergies   Allergen Reactions     Oxycontin [Oxycodone] Itching            Review of Systems:  Pain:  Significantly decreased; 3/10  Dyspnea: 4/10  Fatigue: 0/10  Nausea: 0/10  Anorexia: 1/10  Drowsiness: 0/10  Constipation: none  Agitation: 0  Anxiety: 4/10  Depression: 3/10    Dementia: 0   Delirium: 0  Coma: 0    Palliative Performance Score:     60%- 1. Reduced; 2. Unable to do hobby/housework, significant disease; 3. Occasional assistance necessary; 4. Normal or reduced; 5. Full or confusion  Physical Exam:  /84  Pulse 88  General appearance: alert, appears older than stated age, cooperative and mild distress  Head: Normocephalic, without obvious abnormality, atraumatic  Lungs: clear to auscultation bilaterally; wearing oxygen 2 L NC  Heart: regular rate and  rhythm, S1, S2 normal, no murmur, click, rub or gallop  Abdomen: soft, non-tender; bowel sounds normal; no masses,  no organomegaly  Extremities: extremities normal, atraumatic, no cyanosis or edema; wearing TEDs  Pulses: 2+ and symmetric  Skin: fragile; multilple bruises  Neurologic: Grossly normal; walking with walker       Pertinent Labs  Lab Results: personally reviewed.   Lab Results   Component Value Date     08/14/2018     08/04/2018     08/02/2018    K 4.5 08/14/2018    K 4.4 08/04/2018    K 4.8 08/02/2018    CO2 29 08/14/2018    CO2 26 08/04/2018    CO2 23 08/02/2018    BUN 18 08/14/2018    BUN 21 08/04/2018    BUN 27 08/02/2018    CREATININE 0.83 08/14/2018    CREATININE 0.71 08/04/2018    CREATININE 0.83 08/02/2018    CALCIUM 7.8 (L) 08/14/2018    CALCIUM 7.6 (L) 08/04/2018    CALCIUM 8.3 (L) 08/02/2018     Lab Results   Component Value Date    WBC 20.4 (H) 08/14/2018    WBC 11.7 (H) 08/09/2018    WBC 13.0 (H) 08/06/2018    WBC 6.1 01/05/2015    WBC 7.2 07/15/2014    WBC 7.6 07/02/2014    HGB 8.4 (L) 08/14/2018    HGB 8.0 (L) 08/09/2018    HGB 9.3 (L) 08/06/2018    HCT 27.6 (L) 08/14/2018    HCT 26.3 (L) 08/09/2018    HCT 30.2 (L) 08/06/2018     (H) 08/14/2018     (H) 08/09/2018     (H) 08/06/2018     08/14/2018     08/09/2018     08/06/2018     AST   Date Value Ref Range Status   08/02/2018 18 0 - 40 U/L Final     ALT   Date Value Ref Range Status   08/02/2018 27 0 - 45 U/L Final     Alkaline Phosphatase   Date Value Ref Range Status   08/02/2018 219 (H) 45 - 120 U/L Final     Albumin   Date Value Ref Range Status   08/02/2018 2.9 (L) 3.5 - 5.0 g/dL Final      Pertinent Radiology  Radiology Results: Personally reviewed impression/s  EKG Results: not reviewed.    E/M time/ total time:  45 minutes    >50% of time during encounter was spent with family and patient on counseling and/or care coordination as documented above. y    Dary Colbert,  [facility]  Palliative Care Services  [phone]

## 2021-06-19 NOTE — H&P
[facility] Radiology History and Physical Note  Date/Time: 8/8/2018/7:30 AM    Procedure Requested: Port placement  Requesting Provider: rEica Borrero CNP    HPI: Christian Rees is a 68 y.o. male with history of Afib, DVT, COPD, HTN and metastatic lung cancer s/p LLL lobectomy in April 2013 and chemotherapy now with recurrence of disease. He will be starting chemotherapy again soon, presents today for port placement. Also will have MRI with sedation today.     NPO Status: MN  Anticoagulation/Antiplatelets/Bleeding tendencies: Aspirin  Antibiotics: Ancef for procedure    REVIEW OF SYMPTOMS: Denies recent fevers, chills, night sweats, abdominal pain, N/V/D.    PAST MEDICAL HISTORY:   Past Medical History:   Diagnosis Date     Anxiety      Chronic back pain      Closed Posterior Dislocation Of The Hip     Created by Conversion      COPD (chronic obstructive pulmonary disease) (H) 03/20/2018     Depression      DVT (deep venous thrombosis) (H)      Gout      Heart failure (H)      Hypertension      Lung cancer (H)     Left lung     Persistent Atrial Fibrillation     Created by Conversion [facility] Annotation: Apr 17 2014 11:48AM - Ismael Holbrooket: 3/14 new afib  with contr VR with hip replacement [id number] score of  3 with 1  point each for age 65 and older, HTN and CHF hx---new warfarin start        PAST SURGICAL HISTORY:   Past Surgical History:   Procedure Laterality Date     LUNG SURGERY       RI CARDIOVERSION ELECTIVE ARRHYTHMIA EXTERNAL      Description: Elective Cardioversion External;  Recorded: 05/16/2014;  Comments: 4/25/14     [facility] REMOVAL OF LUNG,LOBECTOMY      Description: Lung Lobectomy;  Proc Date: 01/01/2013;  Comments: left lower     RI TOTAL HIP ARTHROPLASTY Bilateral Left (2011), right (2012)     Description: Total Hip Replacement;  Recorded: 01/11/2013;  Comments: right 2003; ; left 2007 x2     SHOULDER SURGERY Left 2009?    rotator cuff repair       ALLERGIES:  Oxycontin  [oxycodone]    MEDICATIONS:  Current Outpatient Prescriptions   Medication Sig Dispense Refill     acetaminophen (TYLENOL) 500 MG tablet Take 1,000 mg by mouth 3 (three) times a day as needed for pain or fever.        albuterol (PROAIR HFA;PROVENTIL HFA;VENTOLIN HFA) 90 mcg/actuation inhaler Inhale 2 puffs every 6 (six) hours as needed for wheezing. (Patient taking differently: Inhale 2 puffs every 6 (six) hours as needed for wheezing. ) 1 each 3     aspirin 81 MG EC tablet Take 81 mg by mouth daily.        bisacodyl (DULCOLAX) 10 mg suppository Insert 1 suppository (10 mg total) into the rectum daily as needed (constipation).  0     carvedilol (COREG) 6.25 MG tablet Take 1 tablet (6.25 mg total) by mouth 2 (two) times a day with meals. (Patient taking differently: Take 6.25 mg by mouth 2 (two) times a day with meals. ) 60 tablet 0     clonazePAM (KLONOPIN) 0.5 MG tablet Take 1 tablet (0.5 mg total) by mouth at bedtime. 3 tablet 0     clonazePAM (KLONOPIN) 0.5 MG tablet Take 1 tablet (0.5 mg total) by mouth daily as needed for anxiety. 30 tablet 0     cyanocobalamin 1,000 mcg/mL injection Inject 1,000 mcg into the shoulder, thigh, or buttocks every 30 (thirty) days.        fluticasone-vilanterol (BREO ELLIPTA) 200-25 mcg/dose DsDv inhaler Inhale 1 puff daily.        folic acid (FOLVITE) 1 MG tablet Take 1 tablet (1 mg total) by mouth daily. (Patient taking differently: Take 1 mg by mouth daily. ) 90 tablet 3     furosemide (LASIX) 40 MG tablet Take 1 tablet (40 mg total) by mouth daily. 30 tablet 0     ipratropium-albuterol (DUO-NEB) 0.5-2.5 mg/3 mL nebulizer Take 3 mL by nebulization 4 (four) times a day. 120 vial 3     magnesium oxide (MAG-OX) 400 mg tablet Take 400 mg by mouth at bedtime.        mirtazapine (REMERON) 7.5 MG tablet Take 1 tablet (7.5 mg total) by mouth at bedtime. (Patient taking differently: Take 7.5 mg by mouth at bedtime. ) 3 tablet 0     morphine (MS CONTIN) 15 MG 12 hr tablet Take 2 tablets  (30 mg total) by mouth 2 (two) times a day. 120 tablet 0     omeprazole (PRILOSEC) 20 MG capsule Take 20 mg by mouth daily before breakfast.        oxyCODONE (ROXICODONE) 5 MG immediate release tablet Take 3 tablets (15 mg total) by mouth every 6 (six) hours as needed. 30 tablet 0     PARoxetine (PAXIL) 40 MG tablet Take 1 tablet (40 mg total) by mouth every morning. (Patient taking differently: Take 40 mg by mouth every morning. )       polyethylene glycol (MIRALAX) 17 gram packet Take 1 packet (17 g total) by mouth daily as needed.  0     potassium chloride (KLOR-CON) 10 MEQ CR tablet Take 2 tablets (20 mEq total) by mouth 2 (two) times a day. With food.  OK to crush and mix with food (Patient taking differently: Take 20 mEq by mouth 2 (two) times a day. With food.  OK to crush and mix with food)  0     predniSONE (DELTASONE) 20 MG tablet Take 2 tablets (40 mg total) by mouth daily with breakfast. (Patient taking differently: Take 40 mg by mouth daily with breakfast. )  0     [START ON 9/5/2018] predniSONE (DELTASONE) 20 MG tablet Take 1.5 tablets (30 mg total) by mouth daily. (Patient taking differently: Take 30 mg by mouth daily. )  0     senna-docusate (PERICOLACE) 8.6-50 mg tablet Take 2 tablets by mouth daily. (Patient taking differently: Take 2 tablets by mouth daily. )  0     sulfamethoxazole-trimethoprim (SEPTRA) 400-80 mg per tablet Take 1 tablet by mouth daily. 10 tablet 0     No current facility-administered medications for this encounter.      Facility-Administered Medications Ordered in Other Encounters   Medication Dose Route Frequency Provider Last Rate Last Dose     ceFAZolin 2 g in 100 mL in D5W (ANCEF)  2 g Intravenous 30 Min Pre-Op Mindy Nash CNP         lidocaine 10 mg/mL (1 %) injection 0.1-0.3 mL  0.1-0.3 mL Subcutaneous PRN Mindy Nash CNP         sodium chloride flush 3 mL (NS)  3 mL Intravenous Line Care Mindy Nash CNP           LABS:  INR (no units)   Date Value    08/02/2018 1.10     Hemoglobin (g/dL)   Date Value   08/06/2018 9.3 (L)     Platelets (thou/uL)   Date Value   08/06/2018 161     EXAM:  There were no vitals taken for this visit.  General: Stable. In no acute distress.  Neuro: A&O x 3. Moves all extremities equally.  Resp: Lungs clear to auscultation bilaterally.  Cardio: S1S2 and reg, without murmur, clicks or rubs  Skin: Without excoriations, ecchymosis, erythema, lesions or open sores on bilateral chest, previous right port.    Pre-Sedation Assessment:  Mallampati Airway Classification: Class 2: upper half of tonsil fossa visible  Previous reaction to anesthesia/sedation: no  Sedation plan based on assessment: Moderate  Sleep Apnea: no  Dentures: yes  COPD: yes  ASA Classification: ASA 2 - Patient with mild systemic disease with no functional limitations    ASSESSMENT: 68 year old male with recurrent metastatic lung cancer in need of long term IV access for chemotherapy     PLAN: Right port placement with sedation    The procedure, risks and moderate sedation were discussed with the patient, all questions answered and patient agrees to proceed with the procedure. Written consent obtained.    Shanda Belle, CNP    [facility]: Interventional Radiology   [phone]

## 2021-06-19 NOTE — PROGRESS NOTES
[facility]    Facility:   [facility] [[id number]]   Code Status: FULL CODE      CHIEF COMPLAINT/REASON FOR VISIT:  Chief Complaint   Patient presents with     Review Of Multiple Medical Conditions       HISTORY:      HPI: Christian is a 68 y.o. male presented to the hospital with shortness of breath and was discovered to have macrocytic anemia with a hemoglobin of 6.5.  He was transfused and at the time of discharge she was at 8.3.  His reticulocyte count was high, so it was considered not to be due to bone marrow failure, but rather there was concern about other etiologies including his underlying lung cancer which is metastatic and for which she has received chemotherapy but also the possibility of GI source of loss of blood that was occult.  For this reason he was advised to have endoscopies done as an outpatient.  Of note is that he had significant ecchymosis of his left back and shoulder area of unknown etiology.    Upon current review of systems he states that he chronically has diarrhea that has been worse since he has been at the hospital and at transitional care unit.  His nephew accompanies him and states that everyone wants to put him on some type of bowel program out of concern for constipation from his chronic opioid use regarding pain from cancer, but he has never had a problem with constipation and they would like the indications to promote stool to be discontinued.  He currently has no problems of fevers or chills nor cough nor worsening of shortness of breath or chest pain.  His nephew accompanies him today and states that there has not been any blood in the urine.    Past Medical History:   Diagnosis Date     Anxiety      Chronic back pain      Closed Posterior Dislocation Of The Hip     Created by Conversion      COPD (chronic obstructive pulmonary disease) (H) 03/20/2018     Depression      DVT (deep venous thrombosis) (H)      Gout      Heart failure (H)       Hypertension      Lung cancer (H)     Left lung     Persistent Atrial Fibrillation     Created by [facility] Annotation: Apr 17 2014 11:48AM - Deedee Holbrook: 3/14 new afib  with contr VR with hip replacement [id number] score of  3 with 1  point each for age 65 and older, HTN and CHF hx---new warfarin start              Family History   Problem Relation Age of Onset     Cancer Mother      breast and lung     Hypertension Father      No Medical Problems Son      Diabetes Neg Hx      Heart disease Neg Hx      Colon cancer Neg Hx      Prostate cancer Neg Hx      Social History     Social History     Marital status:      Spouse name: N/A     Number of children: N/A     Years of education: N/A     Social History Main Topics     Smoking status: Former Smoker     Packs/day: 1.00     Years: 40.00     Quit date: 1/1/2013     Smokeless tobacco: Never Used     Alcohol use No     Drug use: No     Sexual activity: No     Other Topics Concern     Not on file     Social History Narrative    .  1 child.  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         Review of Systems    .  Vitals:    08/08/18 2101   BP: 129/68   Pulse: 87   Resp: 18   Temp: 98.8  F (37.1  C)   SpO2: 100%       Physical Exam   Constitutional: No distress.   Eyes: Right eye exhibits no discharge. Left eye exhibits no discharge.   Neck: No JVD present.   Cardiovascular: Normal heart sounds.    Pulmonary/Chest:   A few rhonchi but no wheezing after he just received his nebulizer treatment.   Abdominal: Soft. Bowel sounds are normal. He exhibits no distension. There is no tenderness.   Musculoskeletal: He exhibits no edema.   Neurological: He is alert.   Skin:   Pictures of large hematomas that his nephew took were reviewed.  Today there is still large hematoma of the left lateral lower back area with firmness subcutaneous consistent with the large size of this hematoma.  There is also a smaller ecchymosis of the left  shoulder region.   Nursing note and vitals reviewed.        LABS:   No new laboratory testing.    ASSESSMENT:      ICD-10-CM    1. Shortness of breath R06.02    2. Bruising T14.8XXA    3. Anemia, unspecified type D64.9    4. Diarrhea, unspecified type R19.7    5. Lung cancer metastatic to bone (H) C34.90     C79.51        PLAN:    Senna S which was scheduled was discontinued.  The MiraLAX and bisacodyl suppository were discontinued.  These were as needed medications.  I encouraged him that the large hematoma of his left lower back area could account for significant loss of blood.  I encouraged that he does have follow-up at some point for the endoscopies to check to be sure there is not an occult forearm of blood loss occurring.  I also instructed that he and his nephew to observe for signs of black or tarry stools as well as bloody urine maría elena colored stool.      Electronically signed by: Manny Miles MD

## 2021-06-19 NOTE — PROGRESS NOTES
[facility]      Facility:    [facility] [[id number]]  Code Status: UNKNOWN      Chief Complaint/Reason for Visit:  Chief Complaint   Patient presents with     H & P     Cryptogenic organizing pneumonia, hospitalization for acute respiratory failure, COPD, acute diastolic heart failure, paroxysmal atrial fibrillation, adenocarcinoma of the lung with metastases, bilateral lower extremity edema, anxiety disorder, acute and chronic hip and groin pain, oral candidiasis, hypokalemia, hypomagnesia, protein calorie malnutrition.       HPI:   Christian is a 68 y.o. male who was recently admitted to the hospital on 7/16/2018.  He does have a history of metastatic lung cancer with metastases to the bone and he also has COPD with diastolic heart failure.  He came in the hospital acute respiratory failure.  X-ray was done immediately on admission showed persistent patchy infiltrate in the mid to lower lung.  He also had a small left pleural effusion with multiple bilateral metastatic pulmonary nodules and right sided pathologic rib fracture.  Repeat CT scan was done and showed a possibility of cryptogenic organizing pneumonia which pulmonology was consulted.  He went on antibiotics and also prednisone.  He also had pathologic compression fracture of the T8 vertebral body and pro calcitonin was elevated at 1.54.  It was previously normal and sputum culture grew out usual tanisha.  Urine Legionella antigen was negative and pulmonology did recommend treatment of cryptogenic organizing pneumonia with prednisone 40 mg daily ×6 weeks and 30 mg daily until follow-up in 8 weeks.  He was placed on placed on prophylactic medication of Bactrim as well as Augmentin.  He does have severe obstructive lung disease and palliative count consultation was done in the hospital.  His brain natruretic peptide was 734 compared to 497 on 6/30/2018.  He does have an anxiety disorder which psychiatry was consulted  and Paxil was increased to 40 mg daily and he did have oral candidiasis which is being treated for at this time.  His low potassium magnesium and calcium were replaced in the hospital and it was recommended to optimize nutritional status.  He was on Magic cup twice daily with meals and nutrition will follow him here in the TCU.  Patient remains full code at this time and does want all treatments done at this time.  He was treated appropriately and transferred here to the TCU at [facility] in stable condition.    Patient's main problem today is anxiety and pain.  He does have pain in his right leg and likely this is metastatic disease versus DJD.  He is breathing okay at baseline at this time on oxygen and overall he is doing okay.  He is moving his bowels without difficulty and has no new issues.    Past Medical History:  Past Medical History:   Diagnosis Date     Anxiety      Chronic back pain      Closed Posterior Dislocation Of The Hip     Created by Conversion      COPD (chronic obstructive pulmonary disease) (H) 03/20/2018     Depression      DVT (deep venous thrombosis) (H)      Gout      Heart failure (H)      Hypertension      Lung cancer (H)     Left lung     Persistent Atrial Fibrillation     Created by Conversion [facility] Annotation: Apr 17 2014 11:48AM - Ismael Holbrooket: 3/14 new afib  with contr VR with hip replacement [id number] score of  3 with 1  point each for age 65 and older, HTN and CHF hx---new warfarin start            Surgical History:  Past Surgical History:   Procedure Laterality Date     LUNG SURGERY       [facility] CARDIOVERSION ELECTIVE ARRHYTHMIA EXTERNAL      Description: Elective Cardioversion External;  Recorded: 05/16/2014;  Comments: 4/25/14     [facility] REMOVAL OF LUNG,LOBECTOMY      Description: Lung Lobectomy;  Proc Date: 01/01/2013;  Comments: left lower     PA TOTAL HIP ARTHROPLASTY Bilateral Left (2011), right (2012)     Description: Total Hip Replacement;   Recorded: 01/11/2013;  Comments: right 2003; ; left 2007 x2     SHOULDER SURGERY Left 2009?    rotator cuff repair       Family History:   Family History   Problem Relation Age of Onset     Cancer Mother      breast and lung     Hypertension Father      No Medical Problems Son      Diabetes Neg Hx      Heart disease Neg Hx      Colon cancer Neg Hx      Prostate cancer Neg Hx        Social History:    Social History     Social History     Marital status:      Spouse name: N/A     Number of children: N/A     Years of education: N/A     Social History Main Topics     Smoking status: Former Smoker     Packs/day: 1.00     Years: 40.00     Quit date: 1/1/2013     Smokeless tobacco: Never Used     Alcohol use No     Drug use: No     Sexual activity: No     Other Topics Concern     None     Social History Narrative    .  1 child.  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          Review of Systems   Constitutional: Positive for activity change and unexpected weight change. Negative for appetite change.        Positive for anxiety and pain mostly pain in his right leg back and ribs.  Patient denies any fevers chills nausea vomiting diarrhea change in vision hearing taste or smell weakness one side the other chest pain or shortness of breath.  He is moving his bowels without difficulty and urinating without difficulty and the remainder the review of systems is negative.   HENT: Negative for congestion.        Vitals:    07/23/18 1216   BP: 148/83   Pulse: 67   Resp: 20   Temp: 98.7  F (37.1  C)   SpO2: 99%       Physical Exam   Constitutional: No distress.   HENT:   Head: Normocephalic and atraumatic.   Eyes: Conjunctivae are normal. Right eye exhibits no discharge. Left eye exhibits no discharge. No scleral icterus.   Neck: Neck supple. No thyromegaly present.   Cardiovascular: Normal rate and regular rhythm.    Pulmonary/Chest: Effort normal.   Lung sounds bilateral.  With expiratory wheezes bilateral  crackles in the bases.   Abdominal: Soft. Bowel sounds are normal. He exhibits no distension. There is no tenderness.   Musculoskeletal: He exhibits edema and tenderness.   Patient is tender over the rib cage as well as tenderness over the right leg and knee.   Lymphadenopathy:     He has no cervical adenopathy.   Skin: Skin is warm and dry. He is not diaphoretic.   Psychiatric: He has a normal mood and affect. His behavior is normal.       Medication List:  Current Outpatient Prescriptions   Medication Sig     acetaminophen (TYLENOL) 500 MG tablet Take 1,000 mg by mouth 3 (three) times a day as needed for pain.     albuterol (PROAIR HFA;PROVENTIL HFA;VENTOLIN HFA) 90 mcg/actuation inhaler Inhale 2 puffs every 6 (six) hours as needed for wheezing. (Patient taking differently: Inhale 2 puffs every 6 (six) hours as needed for wheezing. )     amoxicillin-clavulanate (AUGMENTIN) 875-125 mg per tablet Take 1 tablet by mouth 2 (two) times a day for 5 days.     aspirin 81 MG EC tablet Take 81 mg by mouth daily.      bisacodyl (DULCOLAX) 10 mg suppository Insert 1 suppository (10 mg total) into the rectum daily as needed (constipation).     carvedilol (COREG) 6.25 MG tablet Take 1 tablet (6.25 mg total) by mouth 2 (two) times a day with meals. (Patient taking differently: Take 6.25 mg by mouth 2 (two) times a day with meals. )     clonazePAM (KLONOPIN) 0.5 MG tablet Take 1 tablet (0.5 mg total) by mouth at bedtime.     cyanocobalamin 1,000 mcg/mL injection Inject 1,000 mcg into the shoulder, thigh, or buttocks every 30 (thirty) days.      fluticasone-vilanterol (BREO ELLIPTA) 200-25 mcg/dose DsDv inhaler Inhale 1 puff daily.      furosemide (LASIX) 40 MG tablet Take 1 tablet (40 mg total) by mouth 2 (two) times a day at 9am and 6pm. X 5 days then change to qam only     ipratropium-albuterol (DUO-NEB) 0.5-2.5 mg/3 mL nebulizer Take 3 mL by nebulization 4 (four) times a day.     Lactobacillus acidoph-L.bulgar (LACTINEX) 100  million cell packet Take 1 packet by mouth 3 (three) times a day with meals for 5 days.     lidocaine 4 % patch Place 1 patch on the skin daily as needed for pain. Remove and discard patch with 12 hours or as directed by MD.     magnesium oxide (MAG-OX) 400 mg tablet Take 1 tablet (400 mg total) by mouth 3 (three) times a day for 5 doses.     mirtazapine (REMERON) 7.5 MG tablet Take 1 tablet (7.5 mg total) by mouth at bedtime.     morphine (MS CONTIN) 15 MG 12 hr tablet Take 1 tablet (15 mg total) by mouth 2 (two) times a day.     nystatin (MYCOSTATIN) 100,000 unit/mL suspension Take 5 mL (500,000 Units total) by mouth 4 (four) times a day for 7 days.     omeprazole (PRILOSEC) 20 MG capsule Take 20 mg by mouth daily before breakfast.      oxyCODONE (ROXICODONE) 5 MG immediate release tablet Take 1 tablet (5 mg total) by mouth every 6 (six) hours as needed.     PARoxetine (PAXIL) 40 MG tablet Take 1 tablet (40 mg total) by mouth every morning.     polyethylene glycol (MIRALAX) 17 gram packet Take 1 packet (17 g total) by mouth daily as needed.     potassium chloride (KLOR-CON) 10 MEQ CR tablet Take 2 tablets (20 mEq total) by mouth 2 (two) times a day. With food.  OK to crush and mix with food     predniSONE (DELTASONE) 20 MG tablet Take 2 tablets (40 mg total) by mouth daily with breakfast.     [START ON 9/5/2018] predniSONE (DELTASONE) 20 MG tablet Take 1.5 tablets (30 mg total) by mouth daily.     senna-docusate (PERICOLACE) 8.6-50 mg tablet Take 2 tablets by mouth daily.     sulfamethoxazole-trimethoprim (SEPTRA) 400-80 mg per tablet Take 1 tablet by mouth daily.       Labs: Hospital labs are as follows; magnesium is 1.3, phosphorus 1.8, calcium 7.4, rest of the basic metabolic profile was within normal limits with the exception of a BUN of 30.  Hemoglobin was 9.2.      Assessment:    ICD-10-CM    1. Acute respiratory failure (H) J96.00    2. Cryptogenic organizing pneumonia (H) J84.116    3. Diastolic congestive  heart failure (H) I50.30    4. Lung cancer metastatic to bone (H) C34.90     C79.51    5. Chronic anemia D64.9    6. Low magnesium levels R79.0    7. Hypokalemia E87.6    8. Hypocalcemia E83.51    9. Oral candidiasis B37.0    10. Lower extremity edema R60.0    11. Anxiety disorder F41.9        Plan: Extensive plan at this time weights to be done a daily basis report any 2 pounds weight changes.  Staff is notified of this.  Brain natruretic peptide be done tomorrow secondary to congestive heart failure and will monitor that very closely basic metabolic profile be done tomorrow secondary to Lasix therapy will check a hemoglobin tomorrow.  Oncology with follow-up next week and oral evaluation by staff for thrush.  Right now is oral mucosa looked clear without any signs of thrush.  Magnesium be done tomorrow secondary to low magnesium levels and registered dietitian will see for protein calorie malnutrition I will increase his oxycodone 10 mg every 6 hours as needed for pain and pain assessments will be done every 6 hours.    I did have a long discussion with patient about possible comfort cares versus aggressive treatment.  He will know more in about a week or 2 when he sees his oncologist.  I will follow-up on this very closely and it is likely recommended the patient is likely terminal from this and comfort cares may be the best way to go.  I will be more little bit more aggressive in handling his pain at this time since he is extremely uncomfortable at this time.  No other changes to care plan at this time.  Greater than 58 minutes was spent on this admission with greater than 50% of the time dedicated to coordination of care.        Electronically signed by: Edgar Mckeon DO

## 2021-06-19 NOTE — PROGRESS NOTES
Palliative Care   Pt and Zay nephew present at visit today.  Dary went over symptoms/ Medications and writer provided resources.  Pt has had 2 recent hospitalization and [facility]. Pt was dc'd from TCU on Friday with [facility]; RN, PT and OT.  Pt notes his wife has health issues to and Zay has been very supportive along with their children.  Pt notes meals are the hardest. They have MOWs but do not like them. Writer provided   [facility] Community Resource list provided writer highlighted   Meal and grocery delivery options along with the health alert.  Pt has a  RN navigator, [facility] involved also with pt x [phone].  Writer is aware pt does not have a HCD in place and is open to working on one with the Palliative Care team at a following visit.  Pt is to meet with Erica HARDY to hear about scans results latter today.  Pt appears alert and orientated able to voice own needs. Pt was able to ambulate with a wheeled walker and portable O2.  Pt is wanting more treatment if able to get it.  Pt and wife to go over resources provided.  Pt denies any further needs today and is aware how to reach team if needs arise.  Plan for next visit, go over the HCD with pt and goals of care.   This visit was cut short as pt was late to visit today. Pt is open to setting up another visit with the Palliative Team.   Guillermina WALTERS

## 2021-06-20 NOTE — PROGRESS NOTES
Day 1, Cycle 1 -- Pt arrived via w/c with the O2@2L/NC continuously - and was connected to the unit's O2 tank. Reviewed plan of care and today's treatment. Accessed port a cath per sterile technique and obtained an excellent blood return. Labs were drawn, and the results shared with the patient. NS was used as the primary IV solution, and administered the premedication of zofran 8 mg IVP. Pemetrexed 1000 mg invused over 10 minutes. At completion the port a cath was flushed, heparinized, and then deaccessed. Pt left the unit at approximately 13:55. Pt was given an appointment schedule, and plans to return on August 31st for labs. Pt was instructed to call with any questions or concerns.    Nirali Edmondson RN

## 2021-06-20 NOTE — PROGRESS NOTES
Assessment:     Diagnoses and all orders for this visit:    Chronic low back pain  -     Ambulatory referral to Pain Clinic    Bony metastasis (H)  -     Ambulatory referral to Pain Clinic    Spinal stenosis of lumbar region without neurogenic claudication  -     Ambulatory referral to Pain Clinic    Stage 4 malignant neoplasm of lung, unspecified laterality (H)  -     Ambulatory referral to Pain Clinic     Christian Rees is a 68 y.o. y.o. male with past medical history significant for generalized anxiety disorder, right bundle branch block, chronic diastolic heart failure, coronary artery disease, venous hypertension, post thrombotic syndrome, acquired lymphedema of the leg, chronic bronchospasm, familial tremor, hypertension, hyperlipidemia, dizziness, anemia, gout, B12 and vitamin D deficiency, history bilateral total hip replacement,  stage IV lung cancer with significant bone metastasis, controlled substance agreement signed 10/2/2017 PCP who presents today for follow-up regarding:    -Chronic progressive generalized entire lower thoracic and lumbar spine pain that is more significant with standing walking however is constant.  Likely related to bony metastasis, does not necessarily follow a stenotic picture at this time however he does have significant lumbar stenosis at L2-3, L3-4 and L4-5 all due to epidural lipomatosis.     Plan:     A shared decision making plan was used. The patient's values and choices were respected. Prior medical records from 10/30/18 were reviewed today. The following represents what was discussed and decided upon by the provider and the patient.        -DIAGNOSTIC TESTS: Images were personally reviewed and interpreted.   --Lumbar spine MRI 8/8/2018 reveals large left thoracolumbar paraspinal hematoma, diffuse bony metastasis, severe right foraminal stenosis at L3-4 and high-grade L2-3, L3-4 and L4-5 spinal canal stenosis due to epidural lipomatosis.  --Pelvic MRI 8/8/2018  reveals innumerable metastatic lesions throughout the lower lumbar spine and pelvis, no fracture noted.  Bilateral total hip arthroplasties noted.  --Lumbar x-ray 10/25/2017 does reveal 10  levoscoliosis.  6 mm retrolisthesis L2-3.  Moderate to advanced degenerative disc disease and facet arthropathy multiple levels with osteophyte formation.    -INTERVENTIONS: Unfortunately I do NOT recommend spinal injections given he has current pneumonia and elevated white count due to the risk of spreading infection to the spine which would be detrimental given he is just starting chemotherapy which will compromise his immune system, lessening his ability to fight any type of infection.    Even given that however he is not a great candidate for spine injections as most of his more generalized lumbar spine pain appears to be more related to bony metastasis than his stenosis in general.  In the injection is going to target the stenosis and not the bony metastasis that is more generalized.    He is also at high risk for bleeding given his low platelet level as well as his significant hematoma noted within the left paraspinal muscles.    -Referral: Referral to [facility] pain clinic for consideration of medical marijuana versus possible intrathecal pain pump however again he does currently have elevated white count.    -I would also defer to Oncology for any consideration for possible radiation therapy that may help with his bony metastasis pain.    -MEDICATIONS: No change in medications, advised patient continue narcotics prescribed by PCP.  Discussed side effects of medications and proper use. Patient verbalized understanding.    -PHYSICAL THERAPY: Could consider physical therapy down the road however he just started a round of chemotherapy and has significant fatigue and nausea vomiting related to this and would like to hold off which is understandable.  Discussed the importance of core strengthening, ROM, stretching exercises  with the patient and how each of these entities is important in decreasing pain.  Explained to the patient that the purpose of physical therapy is to teach the patient a home exercise program.  These exercises need to be performed every day in order to decrease pain and prevent future occurrences of pain.        -PATIENT EDUCATION:  30 minutes of total visit time was spent face to face with the patient today, 60 % of the visit was spent on counseling, education, and coordinating care.   -5 minutes spent outside of visit time, non-face-to-face time, reviewing chart.    -FOLLOW UP: Follow-up with Health system pain clinic  Advised to contact clinic if symptoms worsen or change.    Subjective:     Christian Rees is a 68 y.o. male who presents today for follow-up regarding chronic generalized back pain lower thoracic and entire lumbar spine that he reports started initially on the left however now is equal bilaterally and again throughout his entire lumbar spine that is a constant pain currently a 6/10, a 4 at its best with lying down but up to 10 at its worse with walking and standing.  He reports that bending is significant as well however.    He denies any lower extremity or weakness but does feel generalized weakness at this time given his pain as well as fatigue with recently starting chemotherapy.  Patient denies bowel or bladder dysfunction, denies saddle anesthesia.    Patient is aware of the hematoma in his left thoracic and lumbar spine, denies any type of trauma that precedes this and did have significant bruising when this first occurred but does report that it is improving at this time.  He does have low platelet levels and does bruise easily he reports at this time.  Given the amount of blood in the hematoma as well he had a dramatic drop in his hemoglobin to 6.5 and he did at that time have 2 units of PRBC transfused.  His hemoglobin has now improved to 8.3.    Patient is currently on antibiotics Septra  as well for pneumonia, is seeing pulmonology for this as he does also have a history of COPD.    Patient's son was present today during entire visit and added to past medical/surgical/family/social history and history of presenting illness.     Treatment to Date: No prior spinal surgery.  No prior physical therapy.    Medications:  MS Contin, oxycodone, Klonopin, Tylenol all prescribed by PCP  Prednisone 20 mg prescribed by PCP 8/14/2018, 2 tablets daily for 21 days    Patient Active Problem List   Diagnosis     Generalized Anxiety Disorder     Right Bundle Branch Block With Left Anterior Fascicular Block     SOB (shortness of breath)     Familial (Benign Essential) Tremor     Mixed hyperlipidemia     HTN (hypertension)     CHF (congestive heart failure) (H)     Leukocytosis, unspecified type     Dizziness     Pigmented Nevus     Joint Pain, Localized In The Shoulder     Edema     Joint Pain, Localized In The Hip     Idiopathic chronic gout without tophus, unspecified site     Excessive Sweating     B12 deficiency     Malignant neoplasm metastatic to lung, unspecified laterality (H)     Hypomagnesemia     CAD (coronary artery disease)     Venous hypertension of both lower extremities     Postthrombotic syndrome     Acquired lymphedema of leg     Vitamin D deficiency     Chronic diastolic heart failure (H)     Controlled substance agreement signed     Obstructive sleep apnea syndrome     Chronic midline low back pain without sciatica     Elevated liver enzymes     Stage 4 malignant neoplasm of lung, unspecified laterality (H)     Cancer associated pain     Generalized weakness     Encounter for palliative care     Mood disorder due to medical condition     Depression with anxiety     Sleeping difficulty     Goals of care, counseling/discussion     Cryptogenic organizing pneumonia (H)     Encounter for antineoplastic chemotherapy     Macrocytic anemia     Bone metastasis (H)       Current Outpatient Prescriptions on  File Prior to Encounter   Medication Sig Dispense Refill     acetaminophen (TYLENOL) 500 MG tablet Take 1,000 mg by mouth 3 (three) times a day as needed for pain or fever.        albuterol (PROAIR HFA;PROVENTIL HFA;VENTOLIN HFA) 90 mcg/actuation inhaler Inhale 2 puffs every 6 (six) hours as needed for wheezing. (Patient taking differently: Inhale 2 puffs every 6 (six) hours as needed for wheezing. ) 1 each 3     carvedilol (COREG) 6.25 MG tablet Take 1 tablet (6.25 mg total) by mouth 2 (two) times a day with meals. 180 tablet 3     clonazePAM (KLONOPIN) 0.5 MG tablet Take 1 tablet (0.5 mg total) by mouth at bedtime. 3 tablet 0     cyanocobalamin 1,000 mcg/mL injection Inject 1,000 mcg into the shoulder, thigh, or buttocks every 30 (thirty) days.        fluticasone-vilanterol (BREO ELLIPTA) 200-25 mcg/dose DsDv inhaler Inhale 1 puff daily.        folic acid (FOLVITE) 1 MG tablet Take 1 tablet (1 mg total) by mouth daily. (Patient taking differently: Take 1 mg by mouth daily. ) 90 tablet 3     furosemide (LASIX) 40 MG tablet Take 1 tablet (40 mg total) by mouth daily. 90 tablet 1     ipratropium-albuterol (DUO-NEB) 0.5-2.5 mg/3 mL nebulizer Take 3 mL by nebulization 4 (four) times a day. 120 vial 3     magnesium oxide (MAG-OX) 400 mg tablet Take 400 mg by mouth at bedtime.        mirtazapine (REMERON) 7.5 MG tablet Take 1 tablet (7.5 mg total) by mouth at bedtime. (Patient taking differently: Take 7.5 mg by mouth at bedtime. ) 3 tablet 0     morphine (MS CONTIN) 15 MG 12 hr tablet Take 2 tablets (30 mg total) by mouth 2 (two) times a day. 120 tablet 0     omeprazole (PRILOSEC) 20 MG capsule Take 1 capsule (20 mg total) by mouth daily before breakfast. 90 capsule 3     oxyCODONE (ROXICODONE) 5 MG immediate release tablet Take 3 tablets (15 mg total) by mouth every 6 (six) hours as needed. 30 tablet 0     PARoxetine (PAXIL) 40 MG tablet Take 1 tablet (40 mg total) by mouth every morning. 90 tablet 1     potassium  chloride (KLOR-CON) 10 MEQ CR tablet Take 2 tablets (20 mEq total) by mouth 2 (two) times a day. With food.  OK to crush and mix with food 120 tablet 2     predniSONE (DELTASONE) 20 MG tablet Take 2 tablets (40 mg total) by mouth daily with breakfast for 21 days. 42 tablet 0     sulfamethoxazole-trimethoprim (SEPTRA) 400-80 mg per tablet Take 1 tablet by mouth daily. 30 tablet 1     aspirin 81 MG EC tablet Take 81 mg by mouth daily.        No current facility-administered medications on file prior to encounter.        Allergies   Allergen Reactions     Oxycontin [Oxycodone] Itching       Past Medical History:   Diagnosis Date     Anxiety      Chronic back pain      Closed Posterior Dislocation Of The Hip     Created by Conversion      COPD (chronic obstructive pulmonary disease) (H) 03/20/2018     Depression      DVT (deep venous thrombosis) (H)      Gout      Heart failure (H)      Hypertension      Lung cancer (H)     Left lung     Persistent Atrial Fibrillation     Created by Conversion [facility] Annotation: Apr 17 2014 11:48AM - Sheron Deedee: 3/14 new afib  with contr VR with hip replacement [id number] score of  3 with 1  point each for age 65 and older, HTN and CHF hx---new warfarin start         Review of Systems  ROS: Positive for nausea and vomiting due to recent chemotherapy, ongoing  generalized weakness and balance changes.  Specifically negative for bowel/bladder dysfunction, balance changes, headache, dizziness, foot drop, fevers, chills, appetite changes, unexplained weight loss. Otherwise 13 systems reviewed are negative. Please see the patient's intake questionnaire from today for details.    Reviewed Social, Family, Past Medical and Past Surgical history with patient, no significant changes noted since prior visit.     Objective:     /78 (Patient Site: Right Arm, Patient Position: Sitting)  Pulse 100  Temp 98.4  F (36.9  C) (Oral)   SpO2 100%    PHYSICAL  EXAMINATION:    --CONSTITUTIONAL: Well developed, well nourished, healthy appearing individual.  --PSYCHIATRIC: Appropriate mood and affect. No difficulty interacting due to temper, social withdrawal, or memory issues.  --SKIN: Lumbar region is dry and intact.   --RESPIRATORY: Normal rhythm and effort. No abnormal accessory muscle breathing patterns noted.   --MUSCULOSKELETAL:  Normal lumbar lordosis noted, no lateral shift.  --LUMBAR SPINE:  Inspection reveals no evidence of deformity.   --NEUROLOGIC: Bilateral patellar and achilles reflexes are physiologic and symmetric. Sensation to light touch is intact in the bilateral L4, L5, and S1 dermatomes.    RESULTS:   Imaging: Lumbar spine imaging was reviewed today. The images were shown to the patient and the findings were explained using a spine model.      Mr Lumbar Spine With Without Contrast  Result Date: 8/8/2018  [facility] MR LUMBAR SPINE WITHOUT AND WITH CONTRAST 08/08/2018, 10:12 AM INDICATION: Known metastatic cancer. Worsening right hip pain. TECHNIQUE: Without and with IV contrast. CONTRAST: Gadavist 10 mL. COMPARISON: CT of the abdomen and pelvis 07/06/2018. FINDINGS: Nomenclature is based on five lumbar-type vertebral bodies. Diffuse bony metastases. Stable mild T12-L2 vertebral body height loss. No pars defect. The conus tip is identified at T12-L1. Epidural lipomatosis. Large left thoracolumbar paraspinal  hematoma measuring 37.0 x 61.0 mm in AP by transverse dimensions. It measures at least 142.6 mm in craniocaudal dimension. It extends cranially beyond the field-of-view. No abdominal aortic aneurysm. Diffuse bony metastases. Prosthetic hips. T12-L1: Mild disc height and T2 signal loss. Mild posterior annular bulge. Mild bilateral facet arthropathy. No spinal canal stenosis. No right neural foraminal stenosis. No left neural foraminal stenosis. L1-L2: Degenerative T2 prolongation in the disc. Severe disc height loss. Mild circumferential disc  osteophyte complex. Mild bilateral facet arthropathy. Mild left ventral spinal canal stenosis. No right neural foraminal stenosis. No left neural foraminal stenosis. L2-L3: 4 mm posterior subluxation. Degenerative T2 prolongation in the disc. Severe disc height loss. Mild to moderate bilateral facet arthropathy. Severe spinal canal stenosis. Mild right neural foraminal stenosis. Mild to moderate left neural foraminal  stenosis. L3-L4: Degenerative T2 prolongation in the disc. Severe disc height loss. Mild to moderate circumferential disc osteophyte complex. Moderate bilateral facet arthropathy. Severe spinal canal stenosis. Severe right neural foraminal stenosis. Mild left neural foraminal stenosis. L4-L5: Severe disc height and T2 signal loss. Mild circumferential disc osteophyte complex. Moderate bilateral facet arthropathy. Moderate to severe spinal canal stenosis. Mild right neural foraminal stenosis. Mild left neural foraminal stenosis. L5-S1: Severe disc height and T2 signal loss. Mild circumferential disc osteophyte complex. Moderate bilateral facet arthropathy. Mild left lateral recess stenosis. No central spinal canal stenosis. No right neural foraminal stenosis. No left neural foraminal stenosis.   CONCLUSION:   1.  Large left thoracolumbar paraspinal hematoma.   2.  Diffuse bony metastases.   3.  Severe right L3-L4 neural foraminal stenosis. This correlates with the history of right hip pain.   4.  High-grade L2-L3, L3-L4 and L4-L5 spinal canal stenoses in part due to epidural lipomatosis.   5.  Findings were communicated to Dr. Borrero at 10:45 AM hours on 08/08/2018.       Mr Pelvis With Without Contrast  Result Date: 8/9/2018  MRI PELVIS WITHOUT AND WITH IV CONTRAST 8/8/2018 12:07 PM INDICATION: Worsening pain in hip/thigh, has metastatic lung cancer worsening pain in hip/thigh, has metastatic lung cancer. TECHNIQUE: Routine MRI pelvis without and with IV contrast. Axial, sagittal, and coronal  high-resolution T2 and post gadolinium T1 with fat saturation. IV CONTRAST: Gadavist 10 mL. COMPARISON: 7/20/2018 plain films. FINDINGS: There are innumerable skeletal metastatic lesions throughout the visualized portions of the lower lumbar spine and pelvis, see for example series 5 image 13, series 4 image 12. No definite evidence of metastatic lesions in either proximal femur. Right total hip arthroplasty. Left total hip arthroplasty with cerclage wire fixation proximal femoral shaft. Mild global atrophy of the pelvic and proximal thigh musculature. No evidence of retroperitoneal or inguinal adenopathy. Pelvic intraperitoneal contents normal. Small seroma along the superficial margin of the right greater trochanter. Mild edema within both psoas muscles.   CONCLUSION:   1.  Innumerable metastatic lesions throughout the lower lumbar spine and pelvis.   2.  No evidence of a fracture.   3.  Bilateral total hip arthroplasties.       Xr Lumbar Spine 2 Or 3 Vws  Result Date: 10/26/2017  INDICATION: radicular pain right lower back/leg, no injury COMPARISON: None. FINDINGS: 5 lumbar type vertebral bodies. Bilateral total hip arthroplasties. 10 degrees lumbar levoscoliotic curvature. Mild apex right curvature thoracolumbar junction. Bone subjectively osteopenic. No compression fracture. 6 mm retrolisthesis L2 on L3. Moderate to advanced multilevel degenerative disc disease with loss of disc height and endplate osteophyte formation throughout the lumbar spine. Moderate degenerative set disease in lower lumbar spine. Atherosclerotic disease in the prevertebral soft tissues. Aorta appears to measure up to 3.2 cm suggesting mild aneurysmal dilatation.         Ct Chest Abdomen Pelvis Without Oral With Iv Contrast  Result Date: 10/10/2017  INDICATION: F/U lung cancer. TECHNIQUE: CT chest, abdomen, and pelvis. Dose reduction techniques were used. IV CONTRAST: Iohexol (Omni) 100 mL. COMPARISON: CT scan of the chest, abdomen and  pelvis, 7/11/2017. FINDINGS:   CHEST: There is a 7 x 5 mm pulmonary nodule involving the medial aspect of the right upper lobe (series 3 image 33). This compares with 5 x 4 mm previously. This nodule demonstrates a tree-in-bud appearance, most suggestive of a postinfectious process. There are several other smaller groundglass and solid right lung pulmonary nodules identified which have not significantly changed. Left lower lobectomy. Small amount of pleural fluid is seen on the left. Heart size is stable. Thoracic aorta is normal in  caliber with diffuse atherosclerotic calcification. No lymphadenopathy.    ABDOMEN: The liver is low-dense in appearance, consistent with hepatic steatosis. The pancreas, adrenal glands and kidneys are grossly normal. Stones are present within the gallbladder. Low dense lesion within the posterior aspect of the spleen is stable and of questionable significance. The abdominal aorta is caliber with diffuse atherosclerotic calcification. No lymphadenopathy. No bowel obstruction or abnormal bowel wall thickening.  PELVIS: The appendix is normal. The urinary bladder is grossly normal. No free fluid or lymphadenopathy.   MUSCULOSKELETAL: Bilateral hip arthroplasties. No suspicious osseous lesions.   CONCLUSION:   1.  No compelling evidence for recurrent or metastatic disease. There are several right lung pulmonary nodules, one of which has increased in size since comparison CT scan. This nodule however demonstrates a similar tree-in-bud appearance, suggesting sequela of infectious process. Remainder of the solid and groundglass nodules are stable.

## 2021-06-25 NOTE — PROGRESS NOTES
Progress Notes by Javon Fontanez MD at 2/10/2017 10:10 AM     Author: Javon Fontanez MD Service: -- Author Type: Physician    Filed: 2/10/2017 11:47 AM Encounter Date: 2/10/2017 Status: Signed    : Javon Fontanez MD (Physician)           Click to link to [facility]     [facility] HEART CARE NOTE       Assessment/Plan:   1. Calcified coronary arteries: The patient's chest CT scan was reported advanced atherossclerotic plaques of coronary artery.  His nuclear stress test was negative for inducible myocardial ischemia.  The patient had a normal left ventricular size and function.  His dyspnea on exertion most likely multiple factors including previous lung cancer status post the surgery, diastolic congestive heart failure and deconditioning.    2.  Chronic diastolic congestive heart failure: The patient still has more than 10 pounds of fluid in both legs.  Continue Lasix 40 mg daily.  The patient states that he goes to bathroom frequently after taking Lasix.  We also spent time to discuss to limit salt intake, also reduce fluid intake from 3-4 L a day to 2 L a day.  The patient will follow-up with heart failure clinic in 10 days for routine labs and also monitor fluid status, and adjust Lasix dosage as indicated.      3.  Benign essential hypertension: His blood pressure is not controlled.  Continue lisinopril 20 mg twice a day.  For some reason, he was off amlodipine, not start metoprolol.  Emphasized the importance of controlling his blood pressure.  Start carvedilol 6.25 mg twice a day.  The patient will recheck blood pressure when he come back to heart failure clinic, and adjust the medication as indicated.    4.  Dyslipidemia: Continue Lipitor 40 mg daily.  His LDL was controlled.    5.  History of paroxysmal atrial fibrillation: The patient has been maintained in normal sinus rhythm.    Total 40 minutes were spent in this visit for face to face visit, physical exam, review of current labs/imaging studies,  plan for ongoing treatment.    Thank you for the opportunity to be involved in the care of Christian Rees. If you have any questions, please feel free to contact me.  I will see the patient again in 3 months .    Much or all of the text in this note was generated through the use of Dragon Dictate voice-to-text software. Errors in spelling or words which seem out of context are unintentional.   Sound alike errors, in particular, may have escaped editing.       History of Present Illness:   It is my pleasure to see Christian Rees at the [facility] clinic for routine cardiology follow-up and discussing nuclear stress test report.  Christian Rees is a 66 y.o. male with a medical history of chronic diastolic congestive heart failure, essential hypertension, calcified coronary arteries, history of persistent atrial fibrillation status post cardioversion and maintaining sinus rhythm, hyperlipidemia, history of lung cancer status post the surgical and chemotherapy.    The patient states that he had fluid retention 2 weeks ago, gained 20 pounds.  But over the last 10 days, he has been doing better.  He took Lasix 40 mg daily.  He lost about 10 pounds.  His dyspnea on exertion also much improved.  He still has bilateral leg edema.  He has no chest pain, palpitations, dizziness, orthopnea.  His blood pressure is still high in 144/94 mmHg this morning.  The patient did not take metoprolol which was recommended at her last visit.    The patient states that he drinks several liters of fluid a day, not pay attention to salt intake.    Past Medical History:     Patient Active Problem List   Diagnosis   ? Generalized Anxiety Disorder   ? Right Bundle Branch Block With Left Anterior Fascicular Block   ? Chronic Bronchospasm   ? Familial (Benign Essential) Tremor   ? Mixed hyperlipidemia   ? Essential Hypertension   ? Congestive Heart Failure   ? Dizziness   ? Pigmented Nevus   ? Joint Pain, Localized In The  Shoulder   ? Anemia   ? Edema   ? Joint Pain, Localized In The Hip   ? Gout   ? Excessive Sweating   ? B12 deficiency   ? Lung cancer   ? Low magnesium levels       Past Surgical History:     Past Surgical History:   Procedure Laterality Date   ? LUNG SURGERY     ? DC CARDIOVERSION ELECTIVE ARRHYTHMIA EXTERNAL      Description: Elective Cardioversion External;  Recorded: 05/16/2014;  Comments: 4/25/14   ? DC REMOVAL OF LUNG,LOBECTOMY      Description: Lung Lobectomy;  Proc Date: 01/01/2013;  Comments: left lower   ? DC TOTAL HIP ARTHROPLASTY Bilateral Left (2011), right (2012)     Description: Total Hip Replacement;  Recorded: 01/11/2013;  Comments: right 2003; ; left 2007 x2   ? SHOULDER SURGERY Left 2009?    rotator cuff repair       Family History:     Family History   Problem Relation Age of Onset   ? Cancer Mother      breast and lung   ? Hypertension Father    ? No Medical Problems Son    ? Diabetes Neg Hx    ? Heart disease Neg Hx    ? Colon cancer Neg Hx    ? Prostate cancer Neg Hx        Social History:    reports that he quit smoking about 4 years ago. He has a 40.00 pack-year smoking history. He has never used smokeless tobacco. He reports that he drinks alcohol. He reports that he does not use illicit drugs.    Review of Systems:   General: Weight Gain  Eyes: WNL  Ears/Nose/Throat: Hearing Loss  Lungs: Shortness of Breath  Heart: Shortness of Breath with activity, Leg Swelling  Stomach: Heartburn  Bladder: Frequent Urination at Night  Muscle/Joints: WNL  Skin: Poor Wound Healing  Nervous System: Falls  Mental Health: Anxiety     Blood: Easy Bruising    Meds:     Current Outpatient Prescriptions:   ?  atorvastatin (LIPITOR) 40 MG tablet, TAKE ONE TABLET BY MOUTH EVERY DAY, Disp: 90 tablet, Rfl: 3  ?  dexamethasone (DECADRON) 4 MG tablet, TAKE ONE TABLET BY MOUTH TWICE A DAY FOR 3 DAYS STARTING THE DAY BEFORE PEMETREXED THERAPY, Disp: 72 tablet, Rfl: 0  ?  folic acid (FOLVITE) 1 MG tablet, TAKE ONE TABLET  BY MOUTH EVERY DAY, Disp: 100 tablet, Rfl: 3  ?  furosemide (LASIX) 40 MG tablet, Take 40 mg by mouth daily., Disp: , Rfl:   ?  hydrOXYzine (VISTARIL) 50 MG capsule, Take 1 capsule (50 mg total) by mouth bedtime as needed., Disp: 60 capsule, Rfl: 0  ?  indomethacin (INDOCIN) 50 MG capsule, TAKE ONE CAPSULE BY MOUTH THREE TIMES A DAY WITH FOOD AS NEEDED, Disp: 30 capsule, Rfl: 12  ?  IPRATROPIUM/ALBUTEROL SULFATE (IPRATROPIUM-ALBUTEROL INHL), Inhale 2 puffs 3 (three) times a day as needed. , Disp: , Rfl:   ?  lisinopril (PRINIVIL,ZESTRIL) 20 MG tablet, TAKE ONE TABLET BY MOUTH TWICE A DAY, Disp: 180 tablet, Rfl: 1  ?  LORazepam (ATIVAN) 0.5 MG tablet, Take 1-2 tablets (0.5-1 mg total) by mouth 2 (two) times a day as needed for anxiety., Disp: 60 tablet, Rfl: 1  ?  magnesium oxide (MAGOX) 400 mg tablet, 400mg 3 times daily, Disp: 90 tablet, Rfl: 3  ?  multivitamin (MULTIVITAMIN) per tablet, Take 1 tablet by mouth daily., Disp: , Rfl:   ?  omeprazole (PRILOSEC) 20 MG capsule, Take 20 mg by mouth bedtime., Disp: , Rfl:   ?  oxyCODONE-acetaminophen (PERCOCET) 5-325 mg per tablet, TAKE ONE TO TWO TABLETS BY MOUTH EVERY 4 TO 6 HOURS AS NEEDED, Disp: 60 tablet, Rfl: 0  ?  PARoxetine (PAXIL) 40 MG tablet, TAKE ONE TABLET BY MOUTH EVERY DAY, Disp: 90 tablet, Rfl: 1  ?  potassium chloride SA (K-DUR,KLOR-CON) 20 MEQ tablet, Take 1 tablet (20 mEq total) by mouth daily., Disp: 30 tablet, Rfl: 6  ?  prochlorperazine (COMPAZINE) 10 MG tablet, Take 1 tablet (10 mg total) by mouth every 6 (six) hours as needed (For breakthrough nausea/vomiting)., Disp: 30 tablet, Rfl: 1  ?  carvedilol (COREG) 6.25 MG tablet, Take 1 tablet (6.25 mg total) by mouth 2 (two) times a day with meals., Disp: 60 tablet, Rfl: 11    Current Facility-Administered Medications:   ?  cyanocobalamin injection 1,000 mcg, 1,000 mcg, Intramuscular, Q30 Days, Jag Li MD, 1,000 mcg at 02/08/17 1255    Allergies:   Oxycontin  "[oxycodone]      Objective:      Physical Exam  216 lb (98 kg)  5' 6\" (1.676 m)  Body mass index is 34.86 kg/(m^2).  Visit Vitals   ? BP (!) 144/94 (Patient Site: Right Arm, Patient Position: Sitting, Cuff Size: Adult Large)   ? Pulse (!) 101   ? Resp 20   ? Ht 5' 6\" (1.676 m)   ? Wt 216 lb (98 kg)   ? SpO2 99%   ? BMI 34.86 kg/m2       General Appearance:   Awake, Alert, No acute distress.   HEENT:  Pupil equal and reactive to light. No scleral icterus; the mucous membranes were moist.   Neck: No cervical bruits. No JVD. No thyromegaly.     Chest: The spine was straight. The chest was symmetric.   Lungs:   Respirations unlabored; Lungs are clear to auscultation. No crackles. No wheezing.   Cardiovascular:   Obese. Regular rhythm and rate, normal first and second heart sounds with I/VI systolic murmurs. No rubs or gallops.    Abdomen:  Soft. No tenderness. Non-distended. Bowels sounds are present   Extremities: Equal tibial pulses. 2+ pitting leg edema.   Skin: No rashes or ulcers. Warm, Dry.   Musculoskeletal: No tenderness. No deformity.   Neurologic: Mood and affect are appropriate. No focal deficits.         EKG: Personally reviewed  Sinus rhythm with Premature supraventricular complexes   Right bundle branch block   Left anterior fascicular block   Bifascicular block   Abnormal ECG   When compared with ECG of 10-MAY-2016 09:52,   Premature supraventricular complexes are now Present     Cardiac Imaging Studies  Nuclear stress test: 11-:  CONCLUSION:   1. Regadenosine infusion was subjectively and objectively negative.   2. Lexiscan stress nuclear study is negative for inducible myocardial   ischemia or infarction.     Lab Review   Lab Results   Component Value Date     02/06/2017    K 3.9 02/06/2017     02/06/2017    CO2 24 02/06/2017    BUN 12 02/06/2017    CREATININE 1.06 02/06/2017    CALCIUM 8.9 02/06/2017     Lab Results   Component Value Date    WBC 5.0 02/06/2017    WBC 6.1 01/05/2015 "    HGB 10.3 (L) 02/06/2017    HCT 30.3 (L) 02/06/2017     (H) 02/06/2017     02/06/2017     Lab Results   Component Value Date    CHOL 199 02/22/2016    CHOL 163 07/27/2015    CHOL 216 (H) 02/16/2015     Lab Results   Component Value Date    HDL 70 02/22/2016    HDL 58 07/27/2015    HDL 74 02/16/2015     Lab Results   Component Value Date    LDLCALC 79 02/22/2016    LDLCALC 85 07/27/2015    LDLCALC  02/16/2015      Comment:      Invalid, Triglycerides >300     Lab Results   Component Value Date    TRIG 251 (H) 02/22/2016    TRIG 101 07/27/2015    TRIG 757 (H) 02/16/2015     Lab Results   Component Value Date    TROPONINI 0.02 01/24/2017     Lab Results   Component Value Date    BNP 84 (H) 01/24/2017

## 2021-07-03 NOTE — ADDENDUM NOTE
Addendum Note by Jessica Corral RN at 7/31/2018  2:38 PM     Author: Jessica Corral RN Service: -- Author Type: Registered Nurse    Filed: 7/31/2018  2:38 PM Encounter Date: 7/31/2018 Status: Signed    : Jessica Corral RN (Registered Nurse)    Addended by: JESSICA CORRAL on: 7/31/2018 02:38 PM        Modules accepted: Orders

## 2021-07-03 NOTE — ADDENDUM NOTE
Addendum Note by Clovis An CMA at 10/30/2017  2:42 PM     Author: Clovis An CMA Service: -- Author Type: Certified Medical Assistant    Filed: 10/30/2017  2:42 PM Date of Service: 10/30/2017  2:42 PM Status: Signed    : Clovis An CMA (Certified Medical Assistant)    Encounter addended by: Clovis An CMA on: 10/30/2017  2:42 PM<BR>     Actions taken: Visit Navigator Flowsheet section accepted

## 2021-07-14 PROBLEM — I50.9 ACUTE EXACERBATION OF CHF (CONGESTIVE HEART FAILURE) (H): Status: RESOLVED | Noted: 2018-01-01 | Resolved: 2018-01-01

## 2021-10-13 NOTE — PROGRESS NOTES
[facility]    Facility:   [facility] [[id number]]   Code Status: FULL CODE  PCP: Cristela De Jesus MD   Phone: [phone]   Fax: [phone]      CHIEF COMPLAINT/REASON FOR VISIT:  Chief Complaint   Patient presents with     Discharge Summary       HISTORY COURSE:  Christian is a 68 y.o. male who was residing here at the [facility] care undergoing physical and occupational therapy and med monitoring for multiple medical problems he does have metastatic lung cancer and pain was a huge issue.  We did go up on his oxycodone as well as his morphine.  He does have an allergy listed to OxyContin however he is on that medication has had no bad reactions.  He was sent to the hospital last week secondary increased bruising and a hemoglobin of 6.5 he has had blood transfusions in the past and it was likely he had some kind of a slow GI bleed.  He does have metastatic lung cancer with metastases to the bone and likely the liver.   He has been able to participate with the rehabilitation services.  Most recently though he does claim that he is having some low back discomfort does have a history of chronic pain.  Also being treated and he does take oxycodone as needed usually about 1 dose per day.  Also does have some generalized anxiety does have clonazepam scheduled but no as needed medications.  Also for pain does have Tylenol as needed usually takes 1 about every other day.  He is on chronic oxygen.  He is getting his duo nebs 4 times a day.  Also for chronic pain is on morphine.  Had a chance to talk about his stay or at least brief stay on the transitional care unit as well as his care possibly going home August 10.  Apparently they are talking about perhaps changing that in giving a couple more days for rehabilitation but it any rate he has been normotensive and also afebrile.    Review of Systems  He currently does not have any new cough or cold or  Patient has a lab appt.  No orders available.  Please review and place orders needed.  Thank you  Lab     flulike symptoms.  Does have a chronic cough chronic wheezes on oxygen chronically.  Denies flulike symptoms nausea vomiting diarrhea dysuria unusual aches or pains.  Vitals:    08/09/18 0949   BP: 118/70   Pulse: 83   Resp: 16   Temp: 97.6  F (36.4  C)   SpO2: 99%       Physical Exam   HENT:   Head: Normocephalic.   Neck: Neck supple. No thyromegaly present.   Cardiovascular: Normal rate, regular rhythm and normal heart sounds.    Pulmonary/Chest: Breath sounds normal.   Chronic wheezing.  Chronic cough.  Chronic use of oxygen.   Abdominal: Bowel sounds are normal. There is no tenderness. There is no guarding.   Musculoskeletal:   Chronic pain.  Generalized debility and weakness.   Lymphadenopathy:     He has no cervical adenopathy.   Neurological: He is alert.   Skin: Skin is warm and dry. No rash noted.     Lab Results   Component Value Date    WBC 13.0 (H) 08/06/2018    HGB 9.3 (L) 08/06/2018    HCT 30.2 (L) 08/06/2018     (H) 08/06/2018     08/06/2018     Results for orders placed or performed during the hospital encounter of 08/02/18   Basic Metabolic Panel   Result Value Ref Range    Sodium 140 136 - 145 mmol/L    Potassium 4.4 3.5 - 5.0 mmol/L    Chloride 105 98 - 107 mmol/L    CO2 26 22 - 31 mmol/L    Anion Gap, Calculation 9 5 - 18 mmol/L    Glucose 101 70 - 125 mg/dL    Calcium 7.6 (L) 8.5 - 10.5 mg/dL    BUN 21 8 - 28 mg/dL    Creatinine 0.71 0.70 - 1.30 mg/dL    GFR MDRD Af Amer >60 >60 mL/min/1.73m2    GFR MDRD Non Af Amer >60 >60 mL/min/1.73m2       Lab Results   Component Value Date    ALT 27 08/02/2018    AST 18 08/02/2018    ALKPHOS 219 (H) 08/02/2018    BILITOT 0.8 08/02/2018         MEDICATION LIST:  Current Outpatient Prescriptions   Medication Sig     acetaminophen (TYLENOL) 500 MG tablet Take 1,000 mg by mouth 3 (three) times a day as needed for pain or fever.      albuterol (PROAIR HFA;PROVENTIL HFA;VENTOLIN HFA) 90 mcg/actuation inhaler Inhale 2 puffs every 6 (six) hours as  needed for wheezing. (Patient taking differently: Inhale 2 puffs every 6 (six) hours as needed for wheezing. )     aspirin 81 MG EC tablet Take 81 mg by mouth daily.      carvedilol (COREG) 6.25 MG tablet Take 1 tablet (6.25 mg total) by mouth 2 (two) times a day with meals. (Patient taking differently: Take 6.25 mg by mouth 2 (two) times a day with meals. )     clonazePAM (KLONOPIN) 0.5 MG tablet Take 1 tablet (0.5 mg total) by mouth at bedtime.     clonazePAM (KLONOPIN) 0.5 MG tablet Take 1 tablet (0.5 mg total) by mouth daily as needed for anxiety.     cyanocobalamin 1,000 mcg/mL injection Inject 1,000 mcg into the shoulder, thigh, or buttocks every 30 (thirty) days.      fluticasone-vilanterol (BREO ELLIPTA) 200-25 mcg/dose DsDv inhaler Inhale 1 puff daily.      folic acid (FOLVITE) 1 MG tablet Take 1 tablet (1 mg total) by mouth daily. (Patient taking differently: Take 1 mg by mouth daily. )     furosemide (LASIX) 40 MG tablet Take 1 tablet (40 mg total) by mouth daily.     ipratropium-albuterol (DUO-NEB) 0.5-2.5 mg/3 mL nebulizer Take 3 mL by nebulization 4 (four) times a day.     magnesium oxide (MAG-OX) 400 mg tablet Take 400 mg by mouth at bedtime.      mirtazapine (REMERON) 7.5 MG tablet Take 1 tablet (7.5 mg total) by mouth at bedtime. (Patient taking differently: Take 7.5 mg by mouth at bedtime. )     morphine (MS CONTIN) 15 MG 12 hr tablet Take 2 tablets (30 mg total) by mouth 2 (two) times a day.     omeprazole (PRILOSEC) 20 MG capsule Take 20 mg by mouth daily before breakfast.      oxyCODONE (ROXICODONE) 5 MG immediate release tablet Take 3 tablets (15 mg total) by mouth every 6 (six) hours as needed.     PARoxetine (PAXIL) 40 MG tablet Take 1 tablet (40 mg total) by mouth every morning. (Patient taking differently: Take 40 mg by mouth every morning. )     potassium chloride (KLOR-CON) 10 MEQ CR tablet Take 2 tablets (20 mEq total) by mouth 2 (two) times a day. With food.  OK to crush and mix with  food (Patient taking differently: Take 20 mEq by mouth 2 (two) times a day. With food.  OK to crush and mix with food)     predniSONE (DELTASONE) 20 MG tablet Take 2 tablets (40 mg total) by mouth daily with breakfast. (Patient taking differently: Take 40 mg by mouth daily with breakfast. )     [START ON 9/5/2018] predniSONE (DELTASONE) 20 MG tablet Take 1.5 tablets (30 mg total) by mouth daily. (Patient taking differently: Take 30 mg by mouth daily. )     sulfamethoxazole-trimethoprim (SEPTRA) 400-80 mg per tablet Take 1 tablet by mouth daily.       DISCHARGE DIAGNOSIS:    ICD-10-CM    1. SOB (shortness of breath) R06.02    2. Stage 4 malignant neoplasm of lung, unspecified laterality (H) C34.90    3. Normocytic anemia D64.9    4. Generalized anxiety disorder F41.1        MEDICAL EQUIPMENT NEEDS:  Walker    DISCHARGE PLAN/FACE TO FACE:  I certify that services are/were furnished while this patient was under the care of a physician and that a physician or an allowed non-physician practitioner (NPP), had a face-to-face encounter that meets the physician face-to-face encounter requirements. The encounter was in whole, or in part, related to the primary reason for home health. The patient is confined to his/her home and needs intermittent skilled nursing, physical therapy, speech-language pathology, or the continued need for occupational therapy. A plan of care has been established by a physician and is periodically reviewed by a physician.  Date of Face-to-Face Encounter: August 9, 2018    I certify that, based on my findings, the following services are medically necessary home health services: He will be discharging to home August 10, 2018 with current medications and narcotics will also have physical and occupational therapy home health aide nursing and .    My clinical findings support the need for the above skilled services because: (Please write a brief narrative summary that describes what the RN,  PT, SLP, or other services will be doing in the home. A list of diagnoses in this section does not meet the CMS requirements.)  Secondary to his chronic medical conditions including deconditioning weakness self-care deficits medication management and  for community support    This patient is homebound because: (Please write a brief narrative summary describing the functional limitations as to why this patient is homebound and specifically what makes this patient homebound.)  Secondary to his current chronic medical conditions will also require home safety ambulation transfers strengthening exercises and does have self-care deficits as well as needs assistance with medication management and community support.    The patient is, or has been, under my care and I have initiated the establishment of the plan of care. This patient will be followed by a physician who will periodically review the plan of care.    Schedule follow up visit with primary care provider within 7 days to reestablish care.  He will follow-up with his primary care doctor regarding medication management and any future laboratory studies.  Certainly again looking at his medication and making any appropriate changes.  He does see the pulmonologist on September 20, 2018.    Upon discharge she will require oxygen not only secondary to his chronic medical conditions but his saturation on room air at rest was 86% during ambulation exercises the saturation without oxygen 81% and during ambulation and exercise saturation with oxygen 93%.    Discharge coordination care greater than 30 minutes  Electronically signed by: Michael Duane Johnson, CNP

## 2023-04-03 PROBLEM — C79.51 MALIGNANT NEOPLASM METASTATIC TO BONE (H): Status: ACTIVE | Noted: 2018-01-01
